# Patient Record
Sex: FEMALE | Race: BLACK OR AFRICAN AMERICAN | NOT HISPANIC OR LATINO | Employment: OTHER | URBAN - METROPOLITAN AREA
[De-identification: names, ages, dates, MRNs, and addresses within clinical notes are randomized per-mention and may not be internally consistent; named-entity substitution may affect disease eponyms.]

---

## 2018-07-28 ENCOUNTER — OFFICE VISIT (OUTPATIENT)
Dept: URGENT CARE | Facility: CLINIC | Age: 73
End: 2018-07-28
Payer: MEDICARE

## 2018-07-28 VITALS
OXYGEN SATURATION: 98 % | HEART RATE: 71 BPM | SYSTOLIC BLOOD PRESSURE: 132 MMHG | RESPIRATION RATE: 16 BRPM | WEIGHT: 240 LBS | DIASTOLIC BLOOD PRESSURE: 86 MMHG | HEIGHT: 65 IN | BODY MASS INDEX: 39.99 KG/M2 | TEMPERATURE: 98 F

## 2018-07-28 DIAGNOSIS — H04.202 WATERING OF LEFT EYE: ICD-10-CM

## 2018-07-28 DIAGNOSIS — J34.89 SINUS PAIN: ICD-10-CM

## 2018-07-28 PROCEDURE — 99204 OFFICE O/P NEW MOD 45 MIN: CPT | Performed by: PHYSICIAN ASSISTANT

## 2018-07-28 RX ORDER — FLUTICASONE PROPIONATE 50 MCG
1 SPRAY, SUSPENSION (ML) NASAL DAILY
Qty: 16 G | Refills: 0 | Status: SHIPPED | OUTPATIENT
Start: 2018-07-28 | End: 2021-12-17

## 2018-07-28 RX ORDER — DOXYCYCLINE HYCLATE 100 MG
100 TABLET ORAL 2 TIMES DAILY
Qty: 14 TAB | Refills: 0 | Status: SHIPPED | OUTPATIENT
Start: 2018-07-28 | End: 2018-08-04

## 2018-07-28 ASSESSMENT — ENCOUNTER SYMPTOMS
NAUSEA: 0
DOUBLE VISION: 0
PHOTOPHOBIA: 0
SHORTNESS OF BREATH: 0
PALPITATIONS: 0
BLURRED VISION: 0
EYE REDNESS: 0
FEVER: 0
HEADACHES: 0
MYALGIAS: 0
EYE DISCHARGE: 1
EYE PAIN: 0
VOMITING: 0
ABDOMINAL PAIN: 0
COUGH: 0
CHILLS: 0
TINGLING: 0
SENSORY CHANGE: 0
SINUS PAIN: 1
FOCAL WEAKNESS: 0

## 2018-07-28 NOTE — PROGRESS NOTES
Subjective:      Mimi Appiah is a 72 y.o. female who presents with Eye Problem ((L) eye wont stop watering, pain behind eye, sinus pressure. x 2 days. )            Patient reports eye watering x 2 months. She saw her regular PCP who gave her Erythromycin opthalmic ointment. Her symptoms improved but she continues to have nasal congestion and left sinus pressure/pain. She denies any left eyeball pain or redness. Discharge from left eye is clear. She has no vision changes. She has used warm compresses over her sinuses with moderate relief. She has also use salt water gargles with relief. She has long history of allergic rhinitis.      Past Medical History:   Diagnosis Date   • Abnormal Pap smear    • Allergic rhinitis    • Hemorrhoids    • Hypertension    • LBP (low back pain)        Past Surgical History:   Procedure Laterality Date   • TUBAL COAGULATION LAPAROSCOPIC BILATERAL         Family History   Problem Relation Age of Onset   • Cancer Sister    • Cancer Brother        Allergies   Allergen Reactions   • Mendoza Beans Swelling     Pt gets swollen lips with butter and lima beans.     • Peach [Prunus Persica] Nausea     Pt gets nauseous with peaches   • Atenolol Swelling     Only at doses above 50mg   • Chlorpheniramine    • Pcn [Penicillins] Swelling         Medications, Allergies, and current problem list reviewed today in Epic    Review of Systems   Constitutional: Negative for chills, fever and malaise/fatigue.   HENT: Positive for congestion and sinus pain. Negative for ear discharge and ear pain.    Eyes: Positive for discharge (left eye watering ). Negative for blurred vision, double vision, photophobia, pain and redness.   Respiratory: Negative for cough and shortness of breath.    Cardiovascular: Negative for chest pain, palpitations and leg swelling.   Gastrointestinal: Negative for abdominal pain, nausea and vomiting.   Musculoskeletal: Negative for myalgias.   Skin: Negative for rash.   Neurological:  "Negative for tingling, sensory change, focal weakness and headaches.     All other systems reviewed and are negative.        Objective:     /86   Pulse 71   Temp 36.7 °C (98 °F)   Resp 16   Ht 1.651 m (5' 5\")   Wt 108.9 kg (240 lb)   SpO2 98%   BMI 39.94 kg/m²      Physical Exam   Constitutional: She is oriented to person, place, and time. She appears well-developed and well-nourished. No distress.   HENT:   Head: Normocephalic and atraumatic.   Right Ear: Tympanic membrane, external ear and ear canal normal.   Left Ear: Tympanic membrane, external ear and ear canal normal.   Nose: Mucosal edema and rhinorrhea present. Left sinus exhibits maxillary sinus tenderness and frontal sinus tenderness.   Mouth/Throat: Uvula is midline and oropharynx is clear and moist.   Eyes: Pupils are equal, round, and reactive to light. Conjunctivae, EOM and lids are normal. Right eye exhibits no discharge and no exudate. Left eye exhibits discharge (clear discharge ). Left eye exhibits no exudate. No foreign body present in the left eye.   Neck: Neck supple.   Cardiovascular: Normal rate, regular rhythm and normal heart sounds.  Exam reveals no gallop and no friction rub.    No murmur heard.  Pulmonary/Chest: Effort normal and breath sounds normal. No respiratory distress. She has no wheezes. She has no rales.   Lymphadenopathy:     She has no cervical adenopathy.   Neurological: She is alert and oriented to person, place, and time. No cranial nerve deficit.   Skin: Skin is warm and dry. No rash noted.   Psychiatric: She has a normal mood and affect. Her behavior is normal. Judgment and thought content normal.               Assessment/Plan:     1. Sinus pain  - continue erythromycin opthalmic ointment   - doxycycline (VIBRAMYCIN) 100 MG Tab; Take 1 Tab by mouth 2 times a day for 7 days.  Dispense: 14 Tab; Refill: 0  - fluticasone (FLONASE) 50 MCG/ACT nasal spray; Spray 1 Spray in nose every day. Each nostril  Dispense: 16 " g; Refill: 0    2. Watering of left eye    - REFERRAL TO OPTOMETRY       Differential diagnoses, Supportive care, and indications for immediate follow-up discussed with patient.   Instructed to return to clinic or nearest emergency department for any change in condition, further concerns, or worsening of symptoms.    The patient demonstrated a good understanding and agreed with the treatment plan.    Ivonne Argueta P.A.-C.

## 2018-09-10 ENCOUNTER — HOSPITAL ENCOUNTER (EMERGENCY)
Facility: MEDICAL CENTER | Age: 73
End: 2018-09-10
Attending: EMERGENCY MEDICINE
Payer: MEDICARE

## 2018-09-10 ENCOUNTER — APPOINTMENT (OUTPATIENT)
Dept: RADIOLOGY | Facility: MEDICAL CENTER | Age: 73
End: 2018-09-10
Attending: EMERGENCY MEDICINE
Payer: MEDICARE

## 2018-09-10 VITALS
HEIGHT: 66 IN | OXYGEN SATURATION: 98 % | RESPIRATION RATE: 18 BRPM | HEART RATE: 88 BPM | TEMPERATURE: 97.7 F | DIASTOLIC BLOOD PRESSURE: 78 MMHG | WEIGHT: 240 LBS | BODY MASS INDEX: 38.57 KG/M2 | SYSTOLIC BLOOD PRESSURE: 145 MMHG

## 2018-09-10 DIAGNOSIS — S39.011A STRAIN OF MUSCLE OF RIGHT GROIN REGION: ICD-10-CM

## 2018-09-10 PROCEDURE — 99284 EMERGENCY DEPT VISIT MOD MDM: CPT

## 2018-09-10 PROCEDURE — 73502 X-RAY EXAM HIP UNI 2-3 VIEWS: CPT | Mod: RT

## 2018-09-10 ASSESSMENT — PAIN SCALES - GENERAL: PAINLEVEL_OUTOF10: 5

## 2018-09-10 NOTE — ED PROVIDER NOTES
ED Provider Note    CHIEF COMPLAINT  Chief Complaint   Patient presents with   • Fall       HPI  Mimi Appiah is a 72 y.o. female who presents for evaluation of right medial hip and groin pain. The patient was getting off a local bus, tripped and injured her right hip. She is able to bear weight primarily reports pain in the medial groin area. No injury to the head neck chest abdomen or pelvis. She does not take any systemic anticoagulants. Injury occurred 2 hours ago. She has no other complaints. Pain is worse with ambulation and extension at the hip    REVIEW OF SYSTEMS  See HPI for further details. No head injury neck pain back pain loss of consciousness All other systems are negative.     PAST MEDICAL HISTORY  Past Medical History:   Diagnosis Date   • Abnormal Pap smear    • Allergic rhinitis    • Hemorrhoids    • Hypertension    • LBP (low back pain)        FAMILY HISTORY  Noncontributory    SOCIAL HISTORY  Social History     Social History   • Marital status:      Spouse name: N/A   • Number of children: N/A   • Years of education: N/A     Social History Main Topics   • Smoking status: Former Smoker     Packs/day: 0.50     Types: Cigarettes   • Smokeless tobacco: Never Used   • Alcohol use No   • Drug use: No   • Sexual activity: Not on file     Other Topics Concern   • Not on file     Social History Narrative   • No narrative on file     Denies IV drugs  SURGICAL HISTORY  Past Surgical History:   Procedure Laterality Date   • TUBAL COAGULATION LAPAROSCOPIC BILATERAL         CURRENT MEDICATIONS  Home Medications    **Home medications have not yet been reviewed for this encounter**     No current facility-administered medications for this encounter.     Current Outpatient Prescriptions:   •  fluticasone (FLONASE) 50 MCG/ACT nasal spray, Spray 1 Spray in nose every day. Each nostril, Disp: 16 g, Rfl: 0  •  aspirin (ASA) 325 MG Tab, Take 1 Tab by mouth every day., Disp: 100 Tab, Rfl: 3  •  atorvastatin  "(LIPITOR) 40 MG Tab, Take 1 Tab by mouth every bedtime., Disp: 30 Tab, Rfl: 11  •  metoprolol (LOPRESSOR) 25 MG Tab, Take 25 mg by mouth every day., Disp: , Rfl:   •  amlodipine (NORVASC) 10 MG TABS, Take 1 Tab by mouth every day., Disp: 30 Tab, Rfl: 3      ALLERGIES  Allergies   Allergen Reactions   • Mendoza Beans Swelling     Pt gets swollen lips with butter and lima beans.     • Peach [Prunus Persica] Nausea     Pt gets nauseous with peaches   • Atenolol Swelling     Only at doses above 50mg   • Chlorpheniramine    • Pcn [Penicillins] Swelling       PHYSICAL EXAM  VITAL SIGNS: /84   Pulse 82   Temp 36.5 °C (97.7 °F)   Resp 17   Ht 1.676 m (5' 6\")   Wt 108.9 kg (240 lb)   SpO2 98%   BMI 38.74 kg/m²       Constitutional: Well developed, Well nourished, No acute distress, Non-toxic appearance.   HENT: Normocephalic, Atraumatic, Bilateral external ears normal, Oropharynx moist, No oral exudates, Nose normal.   Eyes: PERRLA, EOMI, Conjunctiva normal, No discharge.   Neck: Normal range of motion, No tenderness, Supple, No stridor.   Cardiovascular: Normal heart rate, Normal rhythm, No murmurs, No rubs, No gallops.   Thorax & Lungs: Normal breath sounds, No respiratory distress, No wheezing, No chest tenderness.   Abdomen: Bowel sounds normal, Soft, No tenderness, No masses, No pulsatile masses.   Skin: Warm, Dry, No erythema, No rash.   Back: No tenderness, No CVA tenderness.   Extremities: No bony tenderness on the pelvis pelvis is stable. Pain is elicited with extension at the hip with tenderness on the right medial thigh  Neurologic: Alert & oriented x 3, Normal motor function, Normal sensory function, No focal deficits noted.   Psychiatric: Anxious.       RADIOLOGY/PROCEDURES  DX-HIP-COMPLETE - UNILATERAL 2+ RIGHT   Final Result      No radiographic evidence of acute traumatic injury.          COURSE & MEDICAL DECISION MAKING  Pertinent Labs & Imaging studies reviewed. (See chart for details)  The " patient here is no evidence of hip fracture. As was a concern of an avulsion fracture. Clinical exam strongly suggest inguinal and groin strain. I recommended heating packs and says Tylenol and follow-up with PCP in 2-3 days    FINAL IMPRESSION  1.   1. Strain of muscle of right groin region               Electronically signed by: Stephan Hicks, 9/10/2018 4:41 PM

## 2018-09-10 NOTE — ED TRIAGE NOTES
"Pt to triage c/o right upper leg pain after falling down. \"I thought the seat was there but it wasn't\". No LOC.  Pt advised to return to triage nurse for any changes or concerns.   "

## 2018-09-11 NOTE — DISCHARGE INSTRUCTIONS
Inguinal Strain  Your exam shows you have an inguinal strain. This is also known as a pulled groin. This injury is usually due to a pull or partial tear to a muscle or tendon in the groin area. Most groin pulls take several weeks to heal completely. There may be pain with lifting your leg or walking during much of your recovery. Treatment for groin strains includes:  · Rest and avoid lifting or performing activities that increase your pain.  · Apply ice packs for 20-30 minutes every few hours to reduce pain and swelling over the next 2-3 days.  · Medicine to reduce pain and inflammation is often prescribed.  HOME CARE INSTRUCTIONS   While most strains in the groin area will heal with rest, you should also watch for any signs of a more serious condition.   SEEK IMMEDIATE MEDICAL CARE IF:   · You notice unusual swelling or bulging in the groin.  · You have pain or swelling in the testicle.  · Blood in your urine.  · Marked increased pain.  · Weakness or numbness of your leg or abdominal pain.  MAKE SURE YOU:   · Understand these instructions.  · Will watch your condition.  · Will get help right away if you are not doing well or get worse.  Document Released: 01/25/2006 Document Revised: 03/11/2013 Document Reviewed: 04/23/2009  [email or web address]® Patient Information ©2014 [employer].

## 2018-09-11 NOTE — ED NOTES
Pt discharged home as ordered by erp. Pt instructed to follow up with her PCP tomorrow as planned. Pt verbalized understanding. Pt left in her wheelchair.

## 2019-09-03 ENCOUNTER — IMMUNIZATION (OUTPATIENT)
Dept: SOCIAL WORK | Facility: CLINIC | Age: 74
End: 2019-09-03
Payer: MEDICARE

## 2019-09-03 DIAGNOSIS — Z23 NEED FOR VACCINATION: ICD-10-CM

## 2019-09-03 PROCEDURE — 90662 IIV NO PRSV INCREASED AG IM: CPT | Performed by: REGISTERED NURSE

## 2019-09-03 PROCEDURE — G0008 ADMIN INFLUENZA VIRUS VAC: HCPCS | Performed by: REGISTERED NURSE

## 2019-10-31 ENCOUNTER — HOSPITAL ENCOUNTER (OUTPATIENT)
Dept: RADIOLOGY | Facility: MEDICAL CENTER | Age: 74
End: 2019-10-31
Attending: FAMILY MEDICINE
Payer: MEDICARE

## 2019-10-31 DIAGNOSIS — M85.89 OTHER SPECIFIED DISORDERS OF BONE DENSITY AND STRUCTURE, MULTIPLE SITES: ICD-10-CM

## 2019-10-31 PROCEDURE — 77080 DXA BONE DENSITY AXIAL: CPT

## 2021-01-08 ENCOUNTER — HOSPITAL ENCOUNTER (OUTPATIENT)
Dept: RADIOLOGY | Facility: MEDICAL CENTER | Age: 76
End: 2021-01-08
Attending: PHYSICIAN ASSISTANT
Payer: MEDICARE

## 2021-01-08 DIAGNOSIS — M54.16 LUMBAR RADICULOPATHY: ICD-10-CM

## 2021-01-08 DIAGNOSIS — M48.062 PSEUDOCLAUDICATION SYNDROME: ICD-10-CM

## 2021-01-08 PROCEDURE — 72148 MRI LUMBAR SPINE W/O DYE: CPT

## 2021-01-08 PROCEDURE — 72110 X-RAY EXAM L-2 SPINE 4/>VWS: CPT

## 2021-01-15 DIAGNOSIS — Z23 NEED FOR VACCINATION: ICD-10-CM

## 2021-12-17 ENCOUNTER — OFFICE VISIT (OUTPATIENT)
Dept: MEDICAL GROUP | Facility: OTHER | Age: 76
End: 2021-12-17
Payer: MEDICARE

## 2021-12-17 VITALS
SYSTOLIC BLOOD PRESSURE: 130 MMHG | BODY MASS INDEX: 40.84 KG/M2 | TEMPERATURE: 96.2 F | OXYGEN SATURATION: 99 % | WEIGHT: 253 LBS | HEART RATE: 74 BPM | RESPIRATION RATE: 15 BRPM | DIASTOLIC BLOOD PRESSURE: 70 MMHG

## 2021-12-17 DIAGNOSIS — F33.1 MODERATE EPISODE OF RECURRENT MAJOR DEPRESSIVE DISORDER (HCC): ICD-10-CM

## 2021-12-17 DIAGNOSIS — I10 PRIMARY HYPERTENSION: ICD-10-CM

## 2021-12-17 DIAGNOSIS — M1A.9XX0 CHRONIC GOUT WITHOUT TOPHUS, UNSPECIFIED CAUSE, UNSPECIFIED SITE: ICD-10-CM

## 2021-12-17 DIAGNOSIS — Z12.11 SCREENING FOR COLON CANCER: ICD-10-CM

## 2021-12-17 PROCEDURE — 99214 OFFICE O/P EST MOD 30 MIN: CPT | Performed by: FAMILY MEDICINE

## 2021-12-17 RX ORDER — METOPROLOL SUCCINATE 25 MG/1
TABLET, EXTENDED RELEASE ORAL
COMMUNITY
Start: 2014-06-18 | End: 2021-12-17

## 2021-12-17 RX ORDER — ISONIAZID 300 MG/1
TABLET ORAL
COMMUNITY
Start: 2020-09-25 | End: 2021-12-17

## 2021-12-17 RX ORDER — ATORVASTATIN CALCIUM 20 MG/1
TABLET, FILM COATED ORAL
COMMUNITY
Start: 2020-11-24 | End: 2021-12-17

## 2021-12-17 RX ORDER — AMLODIPINE BESYLATE 10 MG/1
TABLET ORAL
COMMUNITY
Start: 2014-06-18 | End: 2022-01-19 | Stop reason: SDUPTHER

## 2021-12-17 RX ORDER — NICOTINE 21 MG/24HR
PATCH, TRANSDERMAL 24 HOURS TRANSDERMAL
COMMUNITY
Start: 2018-06-18 | End: 2021-12-17

## 2021-12-17 RX ORDER — CARVEDILOL 6.25 MG/1
6.25 TABLET ORAL 2 TIMES DAILY WITH MEALS
Qty: 200 TABLET | Refills: 3 | Status: SHIPPED | OUTPATIENT
Start: 2021-12-17 | End: 2022-03-15 | Stop reason: SDUPTHER

## 2021-12-17 RX ORDER — DESLORATADINE 5 MG/1
TABLET ORAL
COMMUNITY
Start: 2018-10-11 | End: 2021-12-17

## 2021-12-17 RX ORDER — MECLIZINE HYDROCHLORIDE 25 MG/1
TABLET ORAL
COMMUNITY
Start: 2018-06-18 | End: 2021-12-17

## 2021-12-17 RX ORDER — TIZANIDINE 4 MG/1
TABLET ORAL
COMMUNITY
Start: 2018-10-11 | End: 2021-12-17

## 2021-12-17 RX ORDER — SENNOSIDES 8.6 MG
CAPSULE ORAL
COMMUNITY
Start: 2019-02-11 | End: 2023-08-22

## 2021-12-17 RX ORDER — VENLAFAXINE 100 MG/1
TABLET ORAL
COMMUNITY
Start: 2021-08-12 | End: 2021-12-17

## 2021-12-17 RX ORDER — OLOPATADINE HYDROCHLORIDE 2 MG/ML
SOLUTION/ DROPS OPHTHALMIC
COMMUNITY
Start: 2018-10-11 | End: 2021-12-17

## 2021-12-17 RX ORDER — ALLOPURINOL 100 MG/1
TABLET ORAL
COMMUNITY
Start: 2021-12-09 | End: 2022-10-05 | Stop reason: SDUPTHER

## 2021-12-17 RX ORDER — VENLAFAXINE HYDROCHLORIDE 37.5 MG/1
CAPSULE, EXTENDED RELEASE ORAL
COMMUNITY
Start: 2021-11-03 | End: 2021-12-17

## 2021-12-17 RX ORDER — CHOLECALCIFEROL (VITAMIN D3) 125 MCG
CAPSULE ORAL
COMMUNITY
Start: 2020-12-11 | End: 2021-12-17

## 2021-12-17 RX ORDER — VENLAFAXINE HYDROCHLORIDE 75 MG/1
CAPSULE, EXTENDED RELEASE ORAL
COMMUNITY
Start: 2021-12-08 | End: 2021-12-17

## 2021-12-17 RX ORDER — BUPROPION HYDROCHLORIDE 75 MG/1
75 TABLET ORAL 2 TIMES DAILY
Qty: 60 TABLET | Refills: 2 | Status: SHIPPED | OUTPATIENT
Start: 2021-12-17 | End: 2022-03-16 | Stop reason: SDUPTHER

## 2021-12-17 RX ORDER — CARVEDILOL 6.25 MG/1
TABLET ORAL
COMMUNITY
Start: 2021-11-03 | End: 2021-12-17 | Stop reason: SDUPTHER

## 2021-12-17 RX ORDER — VENLAFAXINE HYDROCHLORIDE 37.5 MG/1
CAPSULE, EXTENDED RELEASE ORAL
COMMUNITY
Start: 2021-01-06 | End: 2021-12-17

## 2021-12-17 RX ORDER — ZOSTER VACCINE RECOMBINANT, ADJUVANTED 50 MCG/0.5
KIT INTRAMUSCULAR
COMMUNITY
Start: 2019-10-16 | End: 2021-12-17

## 2021-12-17 RX ORDER — VENLAFAXINE HYDROCHLORIDE 75 MG/1
CAPSULE, EXTENDED RELEASE ORAL
COMMUNITY
Start: 2020-12-11 | End: 2022-03-15

## 2021-12-17 ASSESSMENT — PATIENT HEALTH QUESTIONNAIRE - PHQ9
5. POOR APPETITE OR OVEREATING: 0 - NOT AT ALL
CLINICAL INTERPRETATION OF PHQ2 SCORE: 2

## 2022-01-14 ENCOUNTER — OFFICE VISIT (OUTPATIENT)
Dept: MEDICAL GROUP | Facility: OTHER | Age: 77
End: 2022-01-14
Payer: MEDICARE

## 2022-01-14 VITALS
OXYGEN SATURATION: 97 % | DIASTOLIC BLOOD PRESSURE: 80 MMHG | WEIGHT: 253 LBS | RESPIRATION RATE: 16 BRPM | SYSTOLIC BLOOD PRESSURE: 130 MMHG | HEIGHT: 65 IN | BODY MASS INDEX: 42.15 KG/M2 | HEART RATE: 76 BPM | TEMPERATURE: 97.7 F

## 2022-01-14 DIAGNOSIS — I63.9 CEREBROVASCULAR ACCIDENT (CVA), UNSPECIFIED MECHANISM (HCC): ICD-10-CM

## 2022-01-14 DIAGNOSIS — E78.5 DYSLIPIDEMIA: ICD-10-CM

## 2022-01-14 DIAGNOSIS — F33.1 MAJOR DEPRESSIVE DISORDER, RECURRENT EPISODE, MODERATE (HCC): ICD-10-CM

## 2022-01-14 DIAGNOSIS — F17.200 TOBACCO DEPENDENCE SYNDROME: ICD-10-CM

## 2022-01-14 PROBLEM — M25.561 KNEE PAIN, RIGHT: Status: ACTIVE | Noted: 2019-02-11

## 2022-01-14 PROBLEM — M20.009 FINGER DEFORMITY: Status: ACTIVE | Noted: 2017-12-11

## 2022-01-14 PROBLEM — M85.89 OTHER SPECIFIED DISORDERS OF BONE DENSITY AND STRUCTURE, MULTIPLE SITES: Status: ACTIVE | Noted: 2019-10-16

## 2022-01-14 PROBLEM — R76.12 NONSPECIFIC REACTION TO CELL MEDIATED IMMUNITY MEASUREMENT OF GAMMA INTERFERON ANTIGEN RESPONSE WITHOUT ACTIVE TUBERCULOSIS: Status: ACTIVE | Noted: 2020-07-30

## 2022-01-14 PROBLEM — E55.9 HYPOVITAMINOSIS D: Status: ACTIVE | Noted: 2020-12-11

## 2022-01-14 PROBLEM — R74.8 HIGH ALKALINE PHOSPHATASE: Status: ACTIVE | Noted: 2020-07-30

## 2022-01-14 PROBLEM — M79.605 LEG PAIN, BILATERAL: Status: ACTIVE | Noted: 2017-09-05

## 2022-01-14 PROBLEM — M79.604 LEG PAIN, BILATERAL: Status: ACTIVE | Noted: 2017-09-05
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
## 2022-01-14 PROBLEM — N18.32 STAGE 3B CHRONIC KIDNEY DISEASE: Status: ACTIVE | Noted: 2018-12-12

## 2022-01-14 PROBLEM — R61 NIGHT SWEATS: Status: ACTIVE | Noted: 2020-07-14

## 2022-01-14 PROBLEM — D72.820 LYMPHOCYTOSIS: Status: ACTIVE | Noted: 2020-07-30

## 2022-01-14 PROBLEM — D75.89 MACROCYTOSIS: Status: ACTIVE | Noted: 2020-08-14

## 2022-01-14 PROBLEM — R25.2 MUSCLE CRAMPS: Status: ACTIVE | Noted: 2017-04-17

## 2022-01-14 PROBLEM — Z22.7 LATENT TUBERCULOSIS: Status: ACTIVE | Noted: 2020-09-25

## 2022-01-14 PROBLEM — K59.00 CONSTIPATION: Status: ACTIVE | Noted: 2018-12-12

## 2022-01-14 PROBLEM — E87.29 HIGH ANION GAP METABOLIC ACIDOSIS: Status: ACTIVE | Noted: 2020-07-30

## 2022-01-14 PROBLEM — R09.81 CONGESTION OF NASAL SINUS: Status: ACTIVE | Noted: 2020-01-07

## 2022-01-14 PROBLEM — R29.898 LOWER EXTREMITY WEAKNESS: Status: ACTIVE | Noted: 2019-10-16

## 2022-01-14 PROBLEM — M10.9 GOUT: Status: ACTIVE | Noted: 2018-11-15

## 2022-01-14 PROBLEM — E34.9 ABNORMALITY OF HORMONE: Status: ACTIVE | Noted: 2020-08-14

## 2022-01-14 PROBLEM — H04.209 EYE TEARING: Status: ACTIVE | Noted: 2018-10-11

## 2022-01-14 PROBLEM — J32.9 SINUSITIS: Status: ACTIVE | Noted: 2020-04-23

## 2022-01-14 PROBLEM — K05.10 GINGIVITIS: Status: ACTIVE | Noted: 2017-11-09

## 2022-01-14 PROCEDURE — 99214 OFFICE O/P EST MOD 30 MIN: CPT | Performed by: FAMILY MEDICINE

## 2022-01-14 RX ORDER — ATORVASTATIN CALCIUM 40 MG/1
40 TABLET, FILM COATED ORAL DAILY
Qty: 30 TABLET | Refills: 11 | Status: SHIPPED | OUTPATIENT
Start: 2022-01-14 | End: 2022-07-01

## 2022-01-14 ASSESSMENT — PATIENT HEALTH QUESTIONNAIRE - PHQ9
8. MOVING OR SPEAKING SO SLOWLY THAT OTHER PEOPLE COULD HAVE NOTICED. OR THE OPPOSITE, BEING SO FIGETY OR RESTLESS THAT YOU HAVE BEEN MOVING AROUND A LOT MORE THAN USUAL: NOT AT ALL
5. POOR APPETITE OR OVEREATING: NOT AT ALL
6. FEELING BAD ABOUT YOURSELF - OR THAT YOU ARE A FAILURE OR HAVE LET YOURSELF OR YOUR FAMILY DOWN: NEARLY EVERY DAY
3. TROUBLE FALLING OR STAYING ASLEEP OR SLEEPING TOO MUCH: NOT AT ALL
4. FEELING TIRED OR HAVING LITTLE ENERGY: SEVERAL DAYS
SUM OF ALL RESPONSES TO PHQ QUESTIONS 1-9: 8
1. LITTLE INTEREST OR PLEASURE IN DOING THINGS: NEARLY EVERY DAY
SUM OF ALL RESPONSES TO PHQ9 QUESTIONS 1 AND 2: 4
2. FEELING DOWN, DEPRESSED, IRRITABLE, OR HOPELESS: SEVERAL DAYS
7. TROUBLE CONCENTRATING ON THINGS, SUCH AS READING THE NEWSPAPER OR WATCHING TELEVISION: NOT AT ALL
9. THOUGHTS THAT YOU WOULD BE BETTER OFF DEAD, OR OF HURTING YOURSELF: NOT AT ALL

## 2022-01-14 ASSESSMENT — FIBROSIS 4 INDEX: FIB4 SCORE: 1.44

## 2022-01-14 NOTE — ASSESSMENT & PLAN NOTE
Will have her start the lower dose of Venlafaxine ER 37.5 mg daily for 30 days, then take it every other day for a month.   Continue taking Wellbutrin 75 mg twice daily.   Recheck in about 6 weeks with Dr Carmen.

## 2022-01-14 NOTE — PATIENT INSTRUCTIONS
Will have you start taking the lower dose of VENLAFAXINE ER 37.5 mg daily for 30 days, then take it every other day for a month.     Continue taking WELLBUTRIN 75 mg twice daily.     Recheck in about 6 weeks with Dr Carmen.     Call if any problems.     NIce to meet you!!!

## 2022-01-14 NOTE — PROGRESS NOTES
[facility] MEDICINE     PATIENT ID:  NAME:  Mimi Appiah  MRN:               [id number]  YOB: 1945    Date: 1:57 PM      CC:  Depression check      HPI: Mimi Appiah is a 76 y.o. female who presented with moderate depression that has been treated with meds.     DEPRESSION --- She has been taking the Venlafaxine ER 75 mg daily and has not started the lower dose of it yet.  Was prescribed 37.5 mg Venlafaxine ER daily to wean down apparently.  She was not sure of what she was supposed to do.  She has been taking the Wellbutrin 75 mg twice daily and feels much better overall.  Wants to get off the Venlafaxine ER.      No past suicide attempts.  She did have some suicidal ideation in past when she tried to just stop taking her Venlafaxine.     SMOKING --- She has cut down to having 4 cigarettes a day and is content to stay there.      CVA --- Has significant past history of stroke.  Using walker to get around.      LIPIDS --- Labs of 1/6/22: , HDL 50.     REVIEW OF SYSTEMS:   Denies chest pain, dyspnea.                 PROBLEM LIST  Patient Active Problem List   Diagnosis   • Hypertension   • Encounter for screening for malignant neoplasm of breast   • Low back pain   • Abnormal Pap smear   • Allergic rhinitis   • HEMORRHOIDS   • Vertebral artery occlusion   • Abnormality of hormone   • Constipation   • CVA (cerebrovascular accident) (HCC)   • Dyslipidemia   • Eye tearing   • Finger deformity   • Leg pain, bilateral   • Knee pain, right   • Gingivitis   • Gout   • High alkaline phosphatase   • High anion gap metabolic acidosis   • Hypovitaminosis D   • Impaired glucose tolerance   • Latent tuberculosis   • Lower extremity weakness   • Lymphocytosis   • Macrocytosis   • Major depressive disorder, recurrent episode, moderate (HCC)   • Mixed anxiety depressive disorder   • Nonspecific reaction to cell mediated immunity measurement of gamma interferon antigen response without active tuberculosis    • Vertigo   • Unspecified fall, initial encounter   • Tobacco dependence syndrome   • Standard chest x-ray abnormal   • Stage 3b chronic kidney disease (HCC)   • Sinusitis   • Congestion of nasal sinus   • Other specified disorders of bone density and structure, multiple sites   • Obesity   • Neck pain, chronic   • Night sweats   • Muscle cramps        PAST SURGICAL HISTORY:  Past Surgical History:   Procedure Laterality Date   • TUBAL COAGULATION LAPAROSCOPIC BILATERAL         FAMILY HISTORY:  Family History   Problem Relation Age of Onset   • Cancer Sister    • Cancer Brother        SOCIAL HISTORY:   Social History     Tobacco Use   • Smoking status: Current Some Day Smoker     Packs/day: 0.50     Types: Cigarettes   • Smokeless tobacco: Never Used   Substance Use Topics   • Alcohol use: No       ALLERGIES:  Allergies   Allergen Reactions   • Penicillins Swelling     Other reaction(s): head and face swelling   • Risperdal [Benzoic Acid-Risperidone]      Other reaction(s): bad reaction all over body   • Mendoza Beans Swelling     Pt gets swollen lips with butter and lima beans.     • Peach [Prunus Persica] Nausea     Pt gets nauseous with peaches   • Atenolol Swelling     Only at doses above 50mg   • Chlorpheniramine        OUTPATIENT MEDICATIONS:    Current Outpatient Medications:   •  acetaminophen (TYLENOL) 650 MG CR tablet, ACETAMINOPHEN  MG CR-TABS, Disp: , Rfl:   •  allopurinol (ZYLOPRIM) 100 MG Tab, , Disp: , Rfl:   •  venlafaxine XR (EFFEXOR XR) 75 MG CAPSULE SR 24 HR, VENLAFAXINE HCL ER 75 MG XR24H-CAP, Disp: , Rfl:   •  amLODIPine (NORVASC) 10 MG Tab, AMLODIPINE BESYLATE 10 MG TABS, Disp: , Rfl:   •  buPROPion (WELLBUTRIN) 75 MG Tab, Take 1 Tablet by mouth 2 times a day., Disp: 60 Tablet, Rfl: 2  •  carvedilol (COREG) 6.25 MG Tab, Take 1 Tablet by mouth 2 times a day with meals., Disp: 200 Tablet, Rfl: 3  •  aspirin (ASA) 325 MG Tab, Take 1 Tab by mouth every day., Disp: 100 Tab, Rfl: 3    PHYSICAL  "EXAM:  Vitals:    01/14/22 1320   BP: 130/80   BP Location: Left arm   Patient Position: Sitting   BP Cuff Size: Large adult long   Pulse: 76   Resp: 16   Temp: 36.5 °C (97.7 °F)   TempSrc: Temporal   SpO2: 97%   Weight: 115 kg (253 lb)   Height: 1.659 m (5' 5.3\")       General: Pt resting in NAD, cooperative   Skin:  Pink, warm and dry.  HEENT: NC/AT. EOMI.  Extremities:  Full range of motion.  CNS:  Muscle tone is normal. No gross focal neurologic deficits.  Articulate.       ASSESSMENT/PLAN:   76 y.o. female     Problem List Items Addressed This Visit     Major depressive disorder, recurrent episode, moderate (HCC)     Will have her start the lower dose of Venlafaxine ER 37.5 mg daily for 30 days, then take it every other day for a month.   Continue taking Wellbutrin 75 mg twice daily.   Recheck in about 6 weeks with Dr Carmen.          Tobacco dependence syndrome     Discussed smoking cessation.  She has done well to cut down.                  CVA --- Continue risk factor modification.  Control blood pressure and lipids.  Discussed smoking cessation.     LIPIDS --- Start Atorvastatin 40 mg daily to reduce stroke risk.  Discussed statin therapy benefits with her.     Jt Potter MD  [facility]     "

## 2022-01-18 NOTE — TELEPHONE ENCOUNTER
PT IS CALLING TO GET REFILL ON AMLODIPINE 10MG TO SAME PHARMACY [facility].

## 2022-01-20 RX ORDER — AMLODIPINE BESYLATE 10 MG/1
10 TABLET ORAL DAILY
Qty: 30 TABLET | Refills: 3 | Status: SHIPPED | OUTPATIENT
Start: 2022-01-20 | End: 2022-03-15 | Stop reason: SDUPTHER

## 2022-03-15 ENCOUNTER — OFFICE VISIT (OUTPATIENT)
Dept: MEDICAL GROUP | Facility: OTHER | Age: 77
End: 2022-03-15
Payer: MEDICARE

## 2022-03-15 VITALS
WEIGHT: 250 LBS | DIASTOLIC BLOOD PRESSURE: 74 MMHG | HEIGHT: 66 IN | SYSTOLIC BLOOD PRESSURE: 134 MMHG | HEART RATE: 72 BPM | RESPIRATION RATE: 20 BRPM | BODY MASS INDEX: 40.18 KG/M2 | TEMPERATURE: 97.6 F | OXYGEN SATURATION: 96 %

## 2022-03-15 DIAGNOSIS — F41.8 MIXED ANXIETY DEPRESSIVE DISORDER: ICD-10-CM

## 2022-03-15 DIAGNOSIS — F33.1 MAJOR DEPRESSIVE DISORDER, RECURRENT EPISODE, MODERATE (HCC): ICD-10-CM

## 2022-03-15 DIAGNOSIS — J30.89 ENVIRONMENTAL AND SEASONAL ALLERGIES: Chronic | ICD-10-CM

## 2022-03-15 DIAGNOSIS — G89.29 NECK PAIN, CHRONIC: ICD-10-CM

## 2022-03-15 DIAGNOSIS — M54.2 NECK PAIN, CHRONIC: ICD-10-CM

## 2022-03-15 DIAGNOSIS — I10 PRIMARY HYPERTENSION: ICD-10-CM

## 2022-03-15 PROCEDURE — 99214 OFFICE O/P EST MOD 30 MIN: CPT | Performed by: FAMILY MEDICINE

## 2022-03-15 RX ORDER — GABAPENTIN 100 MG/1
100 CAPSULE ORAL 2 TIMES DAILY
Qty: 200 CAPSULE | Refills: 3 | Status: SHIPPED | OUTPATIENT
Start: 2022-03-15 | End: 2022-08-16 | Stop reason: SDUPTHER

## 2022-03-15 RX ORDER — MONTELUKAST SODIUM 10 MG/1
10 TABLET ORAL DAILY
Qty: 100 TABLET | Refills: 3 | Status: SHIPPED | OUTPATIENT
Start: 2022-03-15 | End: 2022-07-01

## 2022-03-15 RX ORDER — VENLAFAXINE HYDROCHLORIDE 37.5 MG/1
37.5 CAPSULE, EXTENDED RELEASE ORAL DAILY
Qty: 30 CAPSULE | Refills: 1 | Status: SHIPPED | OUTPATIENT
Start: 2022-03-15 | End: 2022-07-01

## 2022-03-15 RX ORDER — AMLODIPINE BESYLATE 10 MG/1
10 TABLET ORAL DAILY
Qty: 100 TABLET | Refills: 3 | Status: SHIPPED | OUTPATIENT
Start: 2022-03-15 | End: 2023-03-28 | Stop reason: SDUPTHER

## 2022-03-15 RX ORDER — CARVEDILOL 6.25 MG/1
6.25 TABLET ORAL 2 TIMES DAILY WITH MEALS
Qty: 200 TABLET | Refills: 3 | Status: SHIPPED | OUTPATIENT
Start: 2022-03-15 | End: 2023-03-28 | Stop reason: SDUPTHER

## 2022-03-15 RX ORDER — CETIRIZINE HYDROCHLORIDE 10 MG/1
10 TABLET ORAL DAILY
Qty: 100 TABLET | Refills: 3 | Status: SHIPPED | OUTPATIENT
Start: 2022-03-15 | End: 2023-08-22

## 2022-03-15 ASSESSMENT — FIBROSIS 4 INDEX: FIB4 SCORE: 1.44

## 2022-03-15 NOTE — PATIENT INSTRUCTIONS
Take Effexor XR 37.5 mg every other day for one month and stop.     Other medications refilled including Zyrtec, Montelukast.     Recheck 3 months.

## 2022-03-15 NOTE — PROGRESS NOTES
[facility] [place] [place] [facility] MEDICINE     PATIENT ID:  NAME:  Mimi Appiah  MRN:               [id number]  YOB: 1945    Date: 2:25 PM      CC:  Med refills      HPI: Mimi Appiah is a 76 y.o. female who presented with multiple problems that require medication.  Her allergies have been acting up and was taking Montelukast when she was in [place] which helped her.  Also wants some Zyrtec daily.      Problem   Environmental and Seasonal Allergies    Wants a prescription for Montelukast and also Zyrtec for her allergies.  Has done well with those meds in the past.  Does not have asthma.       Major Depressive Disorder, Recurrent Episode, Moderate (Hcc)    She has been taking the Wellbutrin 75 mg twice daily and feels much better overall.  Has taken Effexor XR 37.5 mg daily since last seen.  Now ready to take it every other day for one month. There are no past suicide attempts.  She did have some suicidal ideation in past when she tried to just stop taking her Venlafaxine.        Neck Pain, Chronic    Has been on Gabapentin 100 mg for chronic pain  And this helps her.       Hypertension    /74 today.  Needs med refilled for hypertension.  Takes Carvedilol and Amlodipine.           REVIEW OF SYSTEMS:   Ten systems reviewed and were negative except as noted in the HPI.                PROBLEM LIST  Patient Active Problem List   Diagnosis   • Hypertension   • Encounter for screening for malignant neoplasm of breast   • Low back pain   • Abnormal Pap smear   • Allergic rhinitis   • HEMORRHOIDS   • Vertebral artery occlusion   • Abnormality of hormone   • Constipation   • CVA (cerebrovascular accident) (HCC)   • Dyslipidemia   • Eye tearing   • Finger deformity   • Leg pain, bilateral   • Knee pain, right   • Gingivitis   • Gout   • High alkaline phosphatase   • High anion gap metabolic acidosis   • Hypovitaminosis D   • Impaired glucose tolerance   • Latent tuberculosis   • Lower extremity weakness   •  Lymphocytosis   • Macrocytosis   • Major depressive disorder, recurrent episode, moderate (HCC)   • Mixed anxiety depressive disorder   • Nonspecific reaction to cell mediated immunity measurement of gamma interferon antigen response without active tuberculosis   • Vertigo   • Unspecified fall, initial encounter   • Tobacco dependence syndrome   • Standard chest x-ray abnormal   • Stage 3b chronic kidney disease (HCC)   • Sinusitis   • Congestion of nasal sinus   • Other specified disorders of bone density and structure, multiple sites   • Obesity   • Neck pain, chronic   • Night sweats   • Muscle cramps   • Environmental and seasonal allergies        PAST SURGICAL HISTORY:  Past Surgical History:   Procedure Laterality Date   • TUBAL COAGULATION LAPAROSCOPIC BILATERAL         FAMILY HISTORY:  Family History   Problem Relation Age of Onset   • Cancer Sister    • Cancer Brother        SOCIAL HISTORY:   Social History     Tobacco Use   • Smoking status: Current Some Day Smoker     Packs/day: 0.50     Types: Cigarettes   • Smokeless tobacco: Never Used   Substance Use Topics   • Alcohol use: No       ALLERGIES:  Allergies   Allergen Reactions   • Penicillins Swelling     Other reaction(s): head and face swelling   • Risperdal [Benzoic Acid-Risperidone]      Other reaction(s): bad reaction all over body   • Mendoza Beans Swelling     Pt gets swollen lips with butter and lima beans.     • Peach [Prunus Persica] Nausea     Pt gets nauseous with peaches   • Atenolol Swelling     Only at doses above 50mg   • Chlorpheniramine        OUTPATIENT MEDICATIONS:    Current Outpatient Medications:   •  carvedilol (COREG) 6.25 MG Tab, Take 1 Tablet by mouth 2 times a day with meals., Disp: 200 Tablet, Rfl: 3  •  amLODIPine (NORVASC) 10 MG Tab, Take 1 Tablet by mouth every day., Disp: 100 Tablet, Rfl: 3  •  montelukast (SINGULAIR) 10 MG Tab, Take 1 Tablet by mouth every day., Disp: 100 Tablet, Rfl: 3  •  cetirizine (ZYRTEC) 10 MG Tab,  "Take 1 Tablet by mouth every day., Disp: 100 Tablet, Rfl: 3  •  venlafaxine XR (EFFEXOR XR) 37.5 MG CAPSULE SR 24 HR, Take 1 Capsule by mouth every day., Disp: 30 Capsule, Rfl: 1  •  gabapentin (NEURONTIN) 100 MG Cap, Take 1 Capsule by mouth 2 times a day., Disp: 200 Capsule, Rfl: 3  •  atorvastatin (LIPITOR) 40 MG Tab, Take 1 Tablet by mouth every day., Disp: 30 Tablet, Rfl: 11  •  acetaminophen (TYLENOL) 650 MG CR tablet, ACETAMINOPHEN  MG CR-TABS, Disp: , Rfl:   •  allopurinol (ZYLOPRIM) 100 MG Tab, , Disp: , Rfl:   •  buPROPion (WELLBUTRIN) 75 MG Tab, Take 1 Tablet by mouth 2 times a day., Disp: 60 Tablet, Rfl: 2  •  aspirin (ASA) 325 MG Tab, Take 1 Tab by mouth every day., Disp: 100 Tab, Rfl: 3    PHYSICAL EXAM:  Vitals:    03/15/22 1328   BP: 134/74   BP Location: Left arm   Patient Position: Sitting   BP Cuff Size: Large adult   Pulse: 72   Resp: 20   Temp: 36.4 °C (97.6 °F)   TempSrc: Temporal   SpO2: 96%   Weight: 113 kg (250 lb)   Height: 1.67 m (5' 5.75\")       General: Pt resting in NAD, cooperative; overweight.  Sitting in wheelchair.    Skin:  Pink, warm and dry.  HEENT: NC/AT. EOMI.  Lungs:  Symmetrical.  CTAB, good air movement   Cardiovascular:  S1/S2 RRR   Abdomen:  Abdomen is soft, nontender.    Extremities:  Full range of motion.  CNS:  Muscle tone is normal. No gross focal neurologic deficits      ASSESSMENT/PLAN:   76 y.o. female     Problem List Items Addressed This Visit     Environmental and seasonal allergies (Chronic)     Start Montelukast 10 mg daily.    Start Zyrtec 10 mg daily for allergy symptoms.          Relevant Medications    montelukast (SINGULAIR) 10 MG Tab    cetirizine (ZYRTEC) 10 MG Tab    Hypertension     Continue Carvedilol and Amlodipine.   Refill both meds for one year.          Relevant Medications    carvedilol (COREG) 6.25 MG Tab    amLODIPine (NORVASC) 10 MG Tab    Major depressive disorder, recurrent episode, moderate (HCC)     Take Effexor XR 37.5 mg every " other day for one month and stop.   Continue Bupropion 75 mg twice daily.          Relevant Medications    venlafaxine XR (EFFEXOR XR) 37.5 MG CAPSULE SR 24 HR    Mixed anxiety depressive disorder    Relevant Medications    venlafaxine XR (EFFEXOR XR) 37.5 MG CAPSULE SR 24 HR    Neck pain, chronic     Will have her take Gabapentin twice daily for her chronic neck and low back pain.           Relevant Medications    venlafaxine XR (EFFEXOR XR) 37.5 MG CAPSULE SR 24 HR    gabapentin (NEURONTIN) 100 MG Cap          Jt Potter MD  [facility]

## 2022-03-15 NOTE — ASSESSMENT & PLAN NOTE
Take Effexor XR 37.5 mg every other day for one month and stop.   Continue Bupropion 75 mg twice daily.

## 2022-03-16 DIAGNOSIS — F33.1 MODERATE EPISODE OF RECURRENT MAJOR DEPRESSIVE DISORDER (HCC): Primary | ICD-10-CM

## 2022-03-16 RX ORDER — BUPROPION HYDROCHLORIDE 75 MG/1
75 TABLET ORAL 2 TIMES DAILY
Qty: 60 TABLET | Refills: 5 | Status: SHIPPED | OUTPATIENT
Start: 2022-03-16 | End: 2022-07-01

## 2022-03-16 NOTE — TELEPHONE ENCOUNTER
Pt came in yesterday for Rx refills. Pt stated that none of her Rx were at the pharmacy. She uses [facility] pharmacy any questions please call pt @ [phone] that is the phone number for the pharmacy.

## 2022-03-16 NOTE — TELEPHONE ENCOUNTER
I called the pharmacy, the only med that was missing was her Bupropion, please send to the pharmacy.

## 2022-05-20 ENCOUNTER — TELEPHONE (OUTPATIENT)
Dept: HEALTH INFORMATION MANAGEMENT | Facility: OTHER | Age: 77
End: 2022-05-20
Payer: MEDICARE

## 2022-06-22 ENCOUNTER — TELEPHONE (OUTPATIENT)
Dept: HEALTH INFORMATION MANAGEMENT | Facility: OTHER | Age: 77
End: 2022-06-22

## 2022-06-22 NOTE — TELEPHONE ENCOUNTER
Member called and had questions about prescription eye glasses and to go over her Comprehensive Health Assessment. I did provide her the number to [facility] customer service. Mimi had the impression that she had a  assigned to her. I did inform her that since she does not have a [facility] PCP that she was not assigned a . I did offer to schedule Mimi with one of our [facility] Providers and she did accept. Scheduled her on 07/08/22 at 12pm with Sapna Wayne at [address] HIPAA verified, no exposure to Covid. I also went over Uber with her since she stated she has no transportation. She will call us back to schedule her Uber for her upcoming appointments.

## 2022-07-01 PROBLEM — E66.01 MORBID OBESITY WITH BMI OF 40.0-44.9, ADULT (HCC): Status: ACTIVE | Noted: 2022-07-01

## 2022-07-01 PROBLEM — I69.30 SEQUELAE, POST-STROKE: Status: ACTIVE | Noted: 2022-07-01

## 2022-07-08 ENCOUNTER — OFFICE VISIT (OUTPATIENT)
Dept: MEDICAL GROUP | Facility: PHYSICIAN GROUP | Age: 77
End: 2022-07-08
Payer: MEDICARE

## 2022-07-08 ENCOUNTER — RESEARCH ENCOUNTER (OUTPATIENT)
Dept: MEDICAL GROUP | Facility: PHYSICIAN GROUP | Age: 77
End: 2022-07-08

## 2022-07-08 VITALS
TEMPERATURE: 98.6 F | HEART RATE: 68 BPM | HEIGHT: 65 IN | SYSTOLIC BLOOD PRESSURE: 130 MMHG | DIASTOLIC BLOOD PRESSURE: 74 MMHG | WEIGHT: 246 LBS | OXYGEN SATURATION: 99 % | BODY MASS INDEX: 40.98 KG/M2 | RESPIRATION RATE: 16 BRPM

## 2022-07-08 DIAGNOSIS — N18.32 CHRONIC KIDNEY DISEASE (CKD) STAGE G3B/A1, MODERATELY DECREASED GLOMERULAR FILTRATION RATE (GFR) BETWEEN 30-44 ML/MIN/1.73 SQUARE METER AND ALBUMINURIA CREATININE RATIO LESS THAN 30 MG/G: ICD-10-CM

## 2022-07-08 DIAGNOSIS — I10 PRIMARY HYPERTENSION: ICD-10-CM

## 2022-07-08 DIAGNOSIS — R73.09 ELEVATED GLUCOSE: ICD-10-CM

## 2022-07-08 DIAGNOSIS — M54.32 SCIATICA OF LEFT SIDE: ICD-10-CM

## 2022-07-08 DIAGNOSIS — Z00.6 RESEARCH STUDY PATIENT: ICD-10-CM

## 2022-07-08 DIAGNOSIS — F33.1 MAJOR DEPRESSIVE DISORDER, RECURRENT EPISODE, MODERATE (HCC): ICD-10-CM

## 2022-07-08 DIAGNOSIS — Z00.00 HEALTHCARE MAINTENANCE: ICD-10-CM

## 2022-07-08 DIAGNOSIS — Z86.73 HISTORY OF CVA (CEREBROVASCULAR ACCIDENT): ICD-10-CM

## 2022-07-08 DIAGNOSIS — M10.9 GOUT, UNSPECIFIED CAUSE, UNSPECIFIED CHRONICITY, UNSPECIFIED SITE: ICD-10-CM

## 2022-07-08 PROBLEM — M85.89 OTHER SPECIFIED DISORDERS OF BONE DENSITY AND STRUCTURE, MULTIPLE SITES: Status: RESOLVED | Noted: 2019-10-16 | Resolved: 2022-07-08
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
## 2022-07-08 PROBLEM — R25.2 MUSCLE CRAMPS: Status: RESOLVED | Noted: 2017-04-17 | Resolved: 2022-07-08

## 2022-07-08 PROBLEM — H04.209 EYE TEARING: Status: RESOLVED | Noted: 2018-10-11 | Resolved: 2022-07-08

## 2022-07-08 PROBLEM — I69.30 SEQUELAE, POST-STROKE: Status: RESOLVED | Noted: 2022-07-01 | Resolved: 2022-07-08

## 2022-07-08 PROBLEM — M20.009 FINGER DEFORMITY: Status: RESOLVED | Noted: 2017-12-11 | Resolved: 2022-07-08

## 2022-07-08 PROBLEM — E34.9 ABNORMALITY OF HORMONE: Status: RESOLVED | Noted: 2020-08-14 | Resolved: 2022-07-08

## 2022-07-08 PROBLEM — J32.9 SINUSITIS: Status: RESOLVED | Noted: 2020-04-23 | Resolved: 2022-07-08

## 2022-07-08 PROBLEM — R61 NIGHT SWEATS: Status: RESOLVED | Noted: 2020-07-14 | Resolved: 2022-07-08

## 2022-07-08 PROBLEM — R09.81 CONGESTION OF NASAL SINUS: Status: RESOLVED | Noted: 2020-01-07 | Resolved: 2022-07-08

## 2022-07-08 PROCEDURE — 99205 OFFICE O/P NEW HI 60 MIN: CPT | Performed by: FAMILY MEDICINE

## 2022-07-08 RX ORDER — ROSUVASTATIN CALCIUM 5 MG/1
5 TABLET, COATED ORAL EVERY EVENING
Qty: 30 TABLET | Refills: 0 | Status: SHIPPED | OUTPATIENT
Start: 2022-07-08 | End: 2023-01-18 | Stop reason: SDUPTHER

## 2022-07-08 ASSESSMENT — FIBROSIS 4 INDEX: FIB4 SCORE: 1.44

## 2022-07-08 NOTE — ASSESSMENT & PLAN NOTE
States she had her last stroke 3 years ago. States she has been told she has had 3 in the past. Currently not on statin therapy at this time. States she had an intolerance to Lipitor, will start low dose Rosuvastatin at this time.
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
## 2022-07-08 NOTE — ASSESSMENT & PLAN NOTE
Chronic, states she had not had a flare up In awhile due to dietary changes. She is also on Allopurinol daily 100mg

## 2022-07-08 NOTE — ASSESSMENT & PLAN NOTE
Chronic, states she takes Gabapentin 100 BID which is effective. States it is worse with increased walking. Is able to exercise per pt daily for the last 2 weeks.

## 2022-07-08 NOTE — ASSESSMENT & PLAN NOTE
Chronic, stable. 130/74 in clinic. Currently on Amlodipine 10 mg and Carvedilol 6.25 twice daily.
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
## 2022-07-08 NOTE — ASSESSMENT & PLAN NOTE
Chronic, history of chronic kidney disease back to the thousand 16.  Most recent GFR in January 2022 was at 33.  Currently not seeing nephrology, states that she took too much Aleve when she was younger for back pain.  She is aware to avoid NSAIDs as well as staying well-hydrated.  We will continue to monitor

## 2022-07-12 ENCOUNTER — TELEPHONE (OUTPATIENT)
Dept: SOCIAL WORK | Facility: CLINIC | Age: 77
End: 2022-07-12

## 2022-08-05 ENCOUNTER — HOSPITAL ENCOUNTER (OUTPATIENT)
Dept: LAB | Facility: MEDICAL CENTER | Age: 77
End: 2022-08-05
Attending: FAMILY MEDICINE
Payer: MEDICARE

## 2022-08-05 DIAGNOSIS — M10.9 GOUT, UNSPECIFIED CAUSE, UNSPECIFIED CHRONICITY, UNSPECIFIED SITE: ICD-10-CM

## 2022-08-05 DIAGNOSIS — R73.09 ELEVATED GLUCOSE: ICD-10-CM

## 2022-08-05 DIAGNOSIS — Z00.00 HEALTHCARE MAINTENANCE: ICD-10-CM

## 2022-08-05 LAB
ALBUMIN SERPL BCP-MCNC: 4 G/DL (ref 3.2–4.9)
ALBUMIN/GLOB SERPL: 1.1 G/DL
ALP SERPL-CCNC: 115 U/L (ref 30–99)
ALT SERPL-CCNC: 7 U/L (ref 2–50)
ANION GAP SERPL CALC-SCNC: 13 MMOL/L (ref 7–16)
AST SERPL-CCNC: 14 U/L (ref 12–45)
BASOPHILS # BLD AUTO: 0.6 % (ref 0–1.8)
BASOPHILS # BLD: 0.05 K/UL (ref 0–0.12)
BILIRUB SERPL-MCNC: 0.5 MG/DL (ref 0.1–1.5)
BUN SERPL-MCNC: 31 MG/DL (ref 8–22)
CALCIUM SERPL-MCNC: 9.1 MG/DL (ref 8.5–10.5)
CHLORIDE SERPL-SCNC: 104 MMOL/L (ref 96–112)
CHOLEST SERPL-MCNC: 151 MG/DL (ref 100–199)
CO2 SERPL-SCNC: 17 MMOL/L (ref 20–33)
CREAT SERPL-MCNC: 2.03 MG/DL (ref 0.5–1.4)
EOSINOPHIL # BLD AUTO: 0.27 K/UL (ref 0–0.51)
EOSINOPHIL NFR BLD: 3 % (ref 0–6.9)
ERYTHROCYTE [DISTWIDTH] IN BLOOD BY AUTOMATED COUNT: 48 FL (ref 35.9–50)
EST. AVERAGE GLUCOSE BLD GHB EST-MCNC: 117 MG/DL
GFR SERPLBLD CREATININE-BSD FMLA CKD-EPI: 25 ML/MIN/1.73 M 2
GLOBULIN SER CALC-MCNC: 3.8 G/DL (ref 1.9–3.5)
GLUCOSE SERPL-MCNC: 78 MG/DL (ref 65–99)
HBA1C MFR BLD: 5.7 % (ref 4–5.6)
HCT VFR BLD AUTO: 38 % (ref 37–47)
HDLC SERPL-MCNC: 46 MG/DL
HGB BLD-MCNC: 12.4 G/DL (ref 12–16)
IMM GRANULOCYTES # BLD AUTO: 0.03 K/UL (ref 0–0.11)
IMM GRANULOCYTES NFR BLD AUTO: 0.3 % (ref 0–0.9)
LDLC SERPL CALC-MCNC: 87 MG/DL
LYMPHOCYTES # BLD AUTO: 4.35 K/UL (ref 1–4.8)
LYMPHOCYTES NFR BLD: 48.4 % (ref 22–41)
MCH RBC QN AUTO: 31.6 PG (ref 27–33)
MCHC RBC AUTO-ENTMCNC: 32.6 G/DL (ref 33.6–35)
MCV RBC AUTO: 96.7 FL (ref 81.4–97.8)
MONOCYTES # BLD AUTO: 0.65 K/UL (ref 0–0.85)
MONOCYTES NFR BLD AUTO: 7.2 % (ref 0–13.4)
NEUTROPHILS # BLD AUTO: 3.64 K/UL (ref 2–7.15)
NEUTROPHILS NFR BLD: 40.5 % (ref 44–72)
NRBC # BLD AUTO: 0 K/UL
NRBC BLD-RTO: 0 /100 WBC
PLATELET # BLD AUTO: 215 K/UL (ref 164–446)
PMV BLD AUTO: 11.5 FL (ref 9–12.9)
POTASSIUM SERPL-SCNC: 4.5 MMOL/L (ref 3.6–5.5)
PROT SERPL-MCNC: 7.8 G/DL (ref 6–8.2)
RBC # BLD AUTO: 3.93 M/UL (ref 4.2–5.4)
SODIUM SERPL-SCNC: 134 MMOL/L (ref 135–145)
TRIGL SERPL-MCNC: 90 MG/DL (ref 0–149)
URATE SERPL-MCNC: 5.6 MG/DL (ref 1.9–8.2)
WBC # BLD AUTO: 9 K/UL (ref 4.8–10.8)

## 2022-08-05 PROCEDURE — 86803 HEPATITIS C AB TEST: CPT

## 2022-08-05 PROCEDURE — 80053 COMPREHEN METABOLIC PANEL: CPT

## 2022-08-05 PROCEDURE — 80061 LIPID PANEL: CPT

## 2022-08-05 PROCEDURE — 85025 COMPLETE CBC W/AUTO DIFF WBC: CPT

## 2022-08-05 PROCEDURE — 84550 ASSAY OF BLOOD/URIC ACID: CPT

## 2022-08-05 PROCEDURE — 83036 HEMOGLOBIN GLYCOSYLATED A1C: CPT

## 2022-08-05 PROCEDURE — 84443 ASSAY THYROID STIM HORMONE: CPT

## 2022-08-05 PROCEDURE — 82306 VITAMIN D 25 HYDROXY: CPT

## 2022-08-05 PROCEDURE — 36415 COLL VENOUS BLD VENIPUNCTURE: CPT

## 2022-08-06 LAB
25(OH)D3 SERPL-MCNC: 22 NG/ML (ref 30–100)
HCV AB SER QL: NORMAL
TSH SERPL DL<=0.005 MIU/L-ACNC: 1.27 UIU/ML (ref 0.38–5.33)

## 2022-08-09 ENCOUNTER — TELEPHONE (OUTPATIENT)
Dept: MEDICAL GROUP | Facility: PHYSICIAN GROUP | Age: 77
End: 2022-08-09
Payer: MEDICARE

## 2022-08-09 NOTE — TELEPHONE ENCOUNTER
Phone Number Called: [phone] (home) [phone] (work)      Call outcome: Spoke to patient regarding message below.    Message: Informed of lab results and pt will discuss at apt.

## 2022-08-09 NOTE — TELEPHONE ENCOUNTER
----- Message from ROCIO Feng sent at 8/9/2022  7:56 AM PDT -----  Can you please call Mimi and let her know her lab results are back and let her know the following:  Her vitamin D is still below the normal range please make sure she is taking vitamin D3 at least 2000 units daily  Her kidney function remains low, her most recent GFR was at 25 so to be sure that great question she is avoiding NSAIDs such as Aleve or ibuprofen address, and making sure she stays well-hydrated however her sodium was slightly low so I would like to make sure that she only has about 4 cups of water daily and then she can have other liquids such as sugar-free drinks to help maintain hydration however if she is drinking a lot of free water a day it can dilute the sodium causing it to be slightly low which I think may be the case.  Her A1c is at 5.7 still in a prediabetic range.  Please let her know we will discuss these results in person at her next appointment.    Thank you

## 2022-08-16 ENCOUNTER — OFFICE VISIT (OUTPATIENT)
Dept: MEDICAL GROUP | Facility: PHYSICIAN GROUP | Age: 77
End: 2022-08-16
Payer: MEDICARE

## 2022-08-16 VITALS
HEART RATE: 74 BPM | TEMPERATURE: 97.6 F | WEIGHT: 246.7 LBS | SYSTOLIC BLOOD PRESSURE: 130 MMHG | RESPIRATION RATE: 16 BRPM | OXYGEN SATURATION: 97 % | BODY MASS INDEX: 41.1 KG/M2 | DIASTOLIC BLOOD PRESSURE: 82 MMHG | HEIGHT: 65 IN

## 2022-08-16 DIAGNOSIS — F33.40 MDD (RECURRENT MAJOR DEPRESSIVE DISORDER) IN REMISSION (HCC): ICD-10-CM

## 2022-08-16 DIAGNOSIS — G89.29 NECK PAIN, CHRONIC: ICD-10-CM

## 2022-08-16 DIAGNOSIS — M54.2 NECK PAIN, CHRONIC: ICD-10-CM

## 2022-08-16 DIAGNOSIS — N18.4 STAGE 4 CHRONIC KIDNEY DISEASE (HCC): ICD-10-CM

## 2022-08-16 DIAGNOSIS — M54.32 SCIATICA OF LEFT SIDE: ICD-10-CM

## 2022-08-16 DIAGNOSIS — Z60.2 ELDERLY PERSON LIVING ALONE: ICD-10-CM

## 2022-08-16 PROCEDURE — 99215 OFFICE O/P EST HI 40 MIN: CPT | Performed by: FAMILY MEDICINE

## 2022-08-16 RX ORDER — GABAPENTIN 100 MG/1
200 CAPSULE ORAL 2 TIMES DAILY
Qty: 200 CAPSULE | Refills: 3 | Status: SHIPPED | OUTPATIENT
Start: 2022-08-16 | End: 2023-08-22

## 2022-08-16 RX ORDER — METHYLPREDNISOLONE 4 MG/1
TABLET ORAL
Qty: 21 TABLET | Refills: 0 | Status: SHIPPED | OUTPATIENT
Start: 2022-08-16 | End: 2023-01-18

## 2022-08-16 RX ORDER — CHLORHEXIDINE GLUCONATE ORAL RINSE 1.2 MG/ML
15 SOLUTION DENTAL 2 TIMES DAILY
Qty: 473 ML | Refills: 0 | Status: SHIPPED | OUTPATIENT
Start: 2022-08-16 | End: 2023-01-18 | Stop reason: SDUPTHER

## 2022-08-16 SDOH — SOCIAL STABILITY - SOCIAL INSECURITY: PROBLEMS RELATED TO LIVING ALONE: Z60.2

## 2022-08-16 ASSESSMENT — FIBROSIS 4 INDEX: FIB4 SCORE: 1.85

## 2022-08-16 ASSESSMENT — PATIENT HEALTH QUESTIONNAIRE - PHQ9
5. POOR APPETITE OR OVEREATING: 1 - SEVERAL DAYS
CLINICAL INTERPRETATION OF PHQ2 SCORE: 0

## 2022-08-16 ASSESSMENT — PAIN SCALES - GENERAL: PAINLEVEL: 10=SEVERE PAIN

## 2022-08-16 NOTE — ASSESSMENT & PLAN NOTE
Chronic, in the past had taken Wellbutrin and Effexor were for depression.  No past suicide attempts, have had some ideations in the past when stopping venlafaxine.  Currently she is not on antidepressants and her PHQ-9 is at 0 today in clinic.  States that her pain is bothersome however currently not depressed at this time.

## 2022-08-16 NOTE — PROGRESS NOTES
Subjective:     CC:   Chief Complaint   Patient presents with    Results     08/05/22    Hip Pain     L, x 1 month got worse,        HPI:   Mimi presents today with  worsening L hip pain.     Sciatica of left side  Chronic, worsening over the last month.  States she had a fall 2 months ago and since then she has had a flareup of her sciatica on the left side.  She is currently on Tylenol 650 mg tablets, averaging about 3 tablets daily however states that she feels like it wears off quickly.  She has tried massaging it, heat packs, cool therapy in the past which she felt was ineffective.  Currently she was taking gabapentin 100 mg twice daily which was working in the past however no longer effective at this time.  Due to her severely decreased kidney function she cannot take NSAIDS I will give her a Medrol Dosepak to assist in decreasing her sciatica flareup and we will increase her gabapentin to 200 mg twice daily.  We will continue to monitor.    MDD (recurrent major depressive disorder) in remission (HCC)  Chronic, in the past had taken Wellbutrin and Effexor were for depression.  No past suicide attempts, have had some ideations in the past when stopping venlafaxine.  Currently she is not on antidepressants and her PHQ-9 is at 0 today in clinic.  States that her pain is bothersome however currently not depressed at this time.    Stage 4 chronic kidney disease (HCC)  Chronic, history of chronic kidney disease back to 2016.  Most recent GFR in January was at 33, most recent now at 25.  Currently not seeing nephrology, states that she took too much Aleve when she was younger for her back pain.  She is currently aware not to take NSAIDs which she is avoiding and staying well hydrated.  We will continue to monitor her kidney function routinely.      Current Outpatient Medications Ordered in Epic   Medication Sig Dispense Refill    gabapentin (NEURONTIN) 100 MG Cap Take 2 Capsules by mouth 2 times a day. 200 Capsule  "3    chlorhexidine (PERIDEX) 0.12 % Solution Take 15 mL by mouth 2 times a day. 473 mL 0    methylPREDNISolone (MEDROL DOSEPAK) 4 MG Tablet Therapy Pack As directed on the packaging label. 21 Tablet 0    aspirin EC (ECOTRIN) 81 MG Tablet Delayed Response Take 81 mg by mouth every day.      rosuvastatin (CRESTOR) 5 MG Tab Take 1 Tablet by mouth every evening. 30 Tablet 0    carvedilol (COREG) 6.25 MG Tab Take 1 Tablet by mouth 2 times a day with meals. 200 Tablet 3    amLODIPine (NORVASC) 10 MG Tab Take 1 Tablet by mouth every day. 100 Tablet 3    cetirizine (ZYRTEC) 10 MG Tab Take 1 Tablet by mouth every day. 100 Tablet 3    acetaminophen (TYLENOL) 650 MG CR tablet ACETAMINOPHEN  MG CR-TABS      allopurinol (ZYLOPRIM) 100 MG Tab        No current Epic-ordered facility-administered medications on file.       Health Maintenance: Completed        Objective:     Exam:  /82 (BP Location: Right arm, Patient Position: Sitting, BP Cuff Size: Small adult)   Pulse 74   Temp 36.4 °C (97.6 °F) (Temporal)   Resp 16   Ht 1.651 m (5' 5\")   Wt 112 kg (246 lb 11.2 oz)   SpO2 97%   BMI 41.05 kg/m²  Body mass index is 41.05 kg/m².    Physical Exam  Vitals reviewed.   Constitutional:       General: She is not in acute distress.     Appearance: Normal appearance.   Eyes:      Conjunctiva/sclera: Conjunctivae normal.      Pupils: Pupils are equal, round, and reactive to light.   Cardiovascular:      Rate and Rhythm: Normal rate and regular rhythm.      Pulses: Normal pulses.      Heart sounds: Normal heart sounds. No murmur heard.  Pulmonary:      Effort: Pulmonary effort is normal. No respiratory distress.      Breath sounds: Normal breath sounds. No stridor. No wheezing, rhonchi or rales.   Chest:      Chest wall: No tenderness.   Abdominal:      General: Bowel sounds are normal.   Musculoskeletal:      Right lower leg: No edema.      Left lower leg: Tenderness present. No edema.      Comments: Pain with palpation " over L sciatica and bursa    Neurological:      Mental Status: She is alert and oriented to person, place, and time.   Psychiatric:         Mood and Affect: Mood normal.         Behavior: Behavior normal.        A chaperone was offered to the patient during today's exam. Patient declined chaperone.        Assessment & Plan:     76 y.o. female with the following -     1. Neck pain, chronic  - gabapentin (NEURONTIN) 100 MG Cap; Take 2 Capsules by mouth 2 times a day.  Dispense: 200 Capsule; Refill: 3    2. Sciatica of left side  Chronic, worse since a fall 2 months ago.  Due to her chronic kidney disease she cannot take NSAIDs, currently taking Tylenol and also increased her gabapentin as well as give her a Medrol Dosepak for her sciatica flare.  Referral sent to physiatry for ongoing management she may need trigger point injections other medication management.  - Referral to Pain Clinic    3. Elderly person living alone  States that she is thinking about moving out of her home into an independent or assisted living, referral to care management placed for assistance.  - REFERRAL TO CARE MANAGEMENT    4. MDD (recurrent major depressive disorder) in remission (HCC)  Chronic, stable PHQ-9 0 in clinic today and is currently not on medication management for this.  5. Stage 4 chronic kidney disease (HCC)  [facility], continues to have declined kidney function, most recent GFR at 25.  We will recheck in about a month to make sure this has been maintained.  Discussed increasing her hydration however currently defers nephrology at this time.  Other orders  - chlorhexidine (PERIDEX) 0.12 % Solution; Take 15 mL by mouth 2 times a day.  Dispense: 473 mL; Refill: 0  - methylPREDNISolone (MEDROL DOSEPAK) 4 MG Tablet Therapy Pack; As directed on the packaging label.  Dispense: 21 Tablet; Refill: 0     I spent a total of 42 minutes with record review, exam, communication with the patient, communication with other providers, and  documentation of this encounter.      Return in about 3 months (around 11/16/2022) for f/u on sciatica pain  .    Please note that this dictation was created using voice recognition software. I have made every reasonable attempt to correct obvious errors, but I expect that there are errors of grammar and possibly content that I did not discover before finalizing the note.

## 2022-08-16 NOTE — ASSESSMENT & PLAN NOTE
Chronic, worsening over the last month.  States she had a fall 2 months ago and since then she has had a flareup of her sciatica on the left side.  She is currently on Tylenol 650 mg tablets, averaging about 3 tablets daily however states that she feels like it wears off quickly.  She has tried massaging it, heat packs, cool therapy in the past which she felt was ineffective.  Currently she was taking gabapentin 100 mg twice daily which was working in the past however no longer effective at this time.  Due to her severely decreased kidney function she cannot take NSAIDS I will give her a Medrol Dosepak to assist in decreasing her sciatica flareup and we will increase her gabapentin to 200 mg twice daily.  We will continue to monitor.

## 2022-08-16 NOTE — ASSESSMENT & PLAN NOTE
Chronic, history of chronic kidney disease back to 2016.  Most recent GFR in January was at 33, most recent now at 25.  Currently not seeing nephrology, states that she took too much Aleve when she was younger for her back pain.  She is currently aware not to take NSAIDs which she is avoiding and staying well hydrated.  We will continue to monitor her kidney function routinely.

## 2022-08-18 ENCOUNTER — PATIENT OUTREACH (OUTPATIENT)
Dept: HEALTH INFORMATION MANAGEMENT | Facility: OTHER | Age: 77
End: 2022-08-18
Payer: MEDICARE

## 2022-08-18 SDOH — ECONOMIC STABILITY: TRANSPORTATION INSECURITY
IN THE PAST 12 MONTHS, HAS THE LACK OF TRANSPORTATION KEPT YOU FROM MEDICAL APPOINTMENTS OR FROM GETTING MEDICATIONS?: NO

## 2022-08-18 SDOH — ECONOMIC STABILITY: TRANSPORTATION INSECURITY
IN THE PAST 12 MONTHS, HAS LACK OF TRANSPORTATION KEPT YOU FROM MEETINGS, WORK, OR FROM GETTING THINGS NEEDED FOR DAILY LIVING?: YES

## 2022-08-18 NOTE — PROGRESS NOTES
"SW call to pt in response to [facility] referral.  Pt reports that she has been struggling with severe pain due to Sciatica, especially this past week, she was unable to sleep for three nights due to pain.  She states that she has had issue for very long time, but this episode \"scared her\" because it was so bad.  She is having difficulty completing tasks, lives alone, no longer drives and her daughter in [place] is unable to assist her because of her own health issues.  Pt also has daughter in [place], but too far away to offer support.  Pt states \"it is hard getting old\".  Pt has homemake services at one time, but terminated them didn't feel needed at the time and caregiver was not doing a good job.  Pt would like to have help again.  Pt also needing alternative transportation that could take to various places other than just to medical appointments. Pt also receptive to food resources to help reduce monthly costs.  MANI will assist pt in completing [employer] application, will provide Food Bank list, register her for [employer] Program. MANI placed referral for pt to [facility] for [facility] Mobility Program and will provide her with Free Uber ride and [facility] transportation services information.  MANI will meet with pt at her home on 8/23/2022 at 2:00 PM to complete [employer] application and go over resources.   "

## 2022-08-23 ENCOUNTER — PATIENT OUTREACH (OUTPATIENT)
Dept: HEALTH INFORMATION MANAGEMENT | Facility: OTHER | Age: 77
End: 2022-08-23
Payer: MEDICARE

## 2022-08-23 NOTE — PROGRESS NOTES
MANI and CHW Madeline made home visit. Pt shares that it is getting more difficulty to get around, she has had falls.   Pt is using motorized wheelchair, but it has been shaking, some issue with it and she is afraid to continue using it.  She has contacted [facility], but they did not have a replacement at this time.  She will have to wait until they get another one.  She also uses walker and cane inside the home, and walker when she leaves the home.  Pt's primary need is transportation so that she will no longer have to pay for uber or taxi.  SW contacted [facility] and pt is in their system, information updated and she can begin scheduling her rides.  Pt was informed of this and that the days she can schedule are Tues, Thurs, Fri.  She can also go to grocery store twice per month.  MANI also gave pt Free uber ride handout and she was informed that SW place referral for [facility] Mobility Program. SW and pt accomplished goal of completing application for [employer] to address home care needs.  MANI sent completed application to the agency. MANI placed referral for [employer] volunteer program as pt identified isolation as issue/ would like to have visitor or someone to go on outings with in community. MANI assisted pt in making eye appointment at [facility] for 10/19 4:00 PM. Pt needs dental appointment, but resource provided was [facility] who only takes children now.  Pt will need alternative resource for dental care.  MANI will pass pt's case to CHW for follow up to ensure resource needs have been met, [employer] application is processed assigned to  and provide general support.

## 2022-09-06 ENCOUNTER — PATIENT OUTREACH (OUTPATIENT)
Dept: HEALTH INFORMATION MANAGEMENT | Facility: OTHER | Age: 77
End: 2022-09-06
Payer: MEDICARE

## 2022-09-06 NOTE — PROGRESS NOTES
9/6/22    BRIANA Correa received a call from patient needing some help with renewing her 's license. CHW offered to come see her tomorrow and help her with making a DMV account renew her license online instead of in person. Patient also needed help with paying the phone bill and wanted to make sure that she was paying it correctly. CHW checked in with patient about her motorized wheelchair and patient said that she is still having trouble with the seat of the wheelchair, so CHW will check tomorrow during the home visit. CHW asked if transportation resources that were given to patient by MANI Shafer were utilized and patient reported that she used the resources and had no trouble doing so. CHW will call Aging and Disability before patient home visit to check application status and see if patient has been assigned to . BRIANA Correa will see patient in person and help with license renewal, paying phone bill, check wheelchair, and scheduling dental appointment.

## 2022-09-07 ENCOUNTER — PATIENT OUTREACH (OUTPATIENT)
Dept: HEALTH INFORMATION MANAGEMENT | Facility: OTHER | Age: 77
End: 2022-09-07
Payer: MEDICARE

## 2022-09-07 NOTE — PROGRESS NOTES
9/7/22    BRIANA Correa went to visit patient in her home to assist with scheduling DMV license renewal. CHW helped with scheduling her appointment online and has an appointment in November. CHW also helped with helping patient set up her email account on her phone so she will be able to see her appointments. CHW will contact patient's insurance to schedule dental appointment, as patient reports she wants an extraction and dentures. CHW will contact patient once dental appointment is scheduled.     CHW has called [employer] and they will call back to ensure that the application has been received for homemaker services.     [employer] called CHW back and have received application and will be assigned to  as soon as the application is reviewed. The  who will be working with patient's case will be contacting the patient directly.

## 2022-09-09 ENCOUNTER — PATIENT OUTREACH (OUTPATIENT)
Dept: HEALTH INFORMATION MANAGEMENT | Facility: OTHER | Age: 77
End: 2022-09-09
Payer: MEDICARE

## 2022-09-09 NOTE — PROGRESS NOTES
9/9/22    CHW Madeline called patient with dentist information that works with her insurance. CHW will mail out packet with dentists that the patient can call to schedule her appointment for dentures and two teeth extractions. Patient has a few preferences in regards to her new dentist, so CHW will highlight dentists that will be convenient for her to get to and match her preferences. Patient reported that she got a new walker that is more steady for her and states that it will be easier for her to get around with the walker instead of the wheelchair that she uses. Patient requested CHW to send [facility] Transportation resource so patient can use resource for her next appointment with Pain Management. CHW will follow up to ensure patient has received mailed resources and tried to schedule her own dentist appointment.

## 2022-09-12 LAB
APOB+LDLR+PCSK9 GENE MUT ANL BLD/T: NOT DETECTED
BRCA1+BRCA2 DEL+DUP + FULL MUT ANL BLD/T: NOT DETECTED
MLH1+MSH2+MSH6+PMS2 GN DEL+DUP+FUL M: NOT DETECTED

## 2022-09-13 ENCOUNTER — PATIENT OUTREACH (OUTPATIENT)
Dept: HEALTH INFORMATION MANAGEMENT | Facility: OTHER | Age: 77
End: 2022-09-13
Payer: MEDICARE

## 2022-09-13 NOTE — PROGRESS NOTES
9/13/22    BRIANA Viramontes received a call from patient and patient was hesitant on going to her appointment. CHW encouraged her to still go to her appointment to voice her concerns with her doctor. Patient slowly agreed and will still go to the appointment on 9/19 with Dr. Garcia. Patient needed assistance with transportation and CHW helped her find the transportation resource that was given to patient by MANI Shafer. Patient will give transportation resource a call to schedule their ride to and from the appointment.       Patient called CHW again to report that the transportation is booked up and she can't get an ride on that day. CHW ensured patient that she will look into another option for patient to try so she can go to her appointment.       CHW called patient back and referred patient to the  [facility] line. CHW instructed patient to call the number and they can assist with scheduling ride to and from the appointment. Patient reported that they will contact BRIANA Viramontes again if they cannot schedule their transportation.

## 2022-09-14 ENCOUNTER — TELEPHONE (OUTPATIENT)
Dept: MEDICAL GROUP | Facility: PHYSICIAN GROUP | Age: 77
End: 2022-09-14
Payer: MEDICARE

## 2022-09-14 NOTE — TELEPHONE ENCOUNTER
Phone Number Called: [phone] (home) [phone] (work)      Call outcome: Spoke to patient regarding message below.    Message: Informed of DNA results. Pt had not other questions at this.

## 2022-09-14 NOTE — TELEPHONE ENCOUNTER
----- Message from ROCIO Feng sent at 9/14/2022  7:43 AM PDT -----  Can somebody please let Mimi know that her DNA analysis panel that she had done through [facility] came back that she had no mutations detected.  Please let me know if she has any questions regarding this.

## 2022-09-15 ENCOUNTER — PATIENT OUTREACH (OUTPATIENT)
Dept: HEALTH INFORMATION MANAGEMENT | Facility: OTHER | Age: 77
End: 2022-09-15
Payer: MEDICARE

## 2022-09-15 NOTE — PROGRESS NOTES
9/15/22    CHW Wander received call from patient in the afternoon. Patient wanted to let the CHW know that she has transportation scheduled and will be able to go to the appointment on time. Patient also mentioned that she lost her insurance card and will get another one mailed to her in two weeks. Patient asked if she needed her insurance card at the new appointment on Monday. W will give patient another phone call on Friday/Monday to confirm that her insurance cards are all scanned in, but in case will give her the insurance policy number.

## 2022-09-16 ENCOUNTER — PATIENT OUTREACH (OUTPATIENT)
Dept: HEALTH INFORMATION MANAGEMENT | Facility: OTHER | Age: 77
End: 2022-09-16
Payer: MEDICARE

## 2022-09-19 ENCOUNTER — APPOINTMENT (OUTPATIENT)
Dept: PHYSICAL MEDICINE AND REHAB | Facility: MEDICAL CENTER | Age: 77
End: 2022-09-19
Payer: MEDICARE

## 2022-09-19 NOTE — PROGRESS NOTES
9/16/22    CHW received call from patient with questions about dentist list that was supplied by CHW. Patient was worried that none of those dentists would be able to work with her insurance and availability. CHW reassured patient that the list of dentists that were given were all one that matched her preferences, will take her insurance, and are near patient's place of residence. CHW told patient to give those dentists a call and let patient know that if that list of dentist does not work, that she will send over another list that she can also contact. CHW will follow up with patient if patient calls to say that none of the dentists can help her.     9/19/22 @8:30AM     CHW received two voicemails from patient stating that her transportation for her would be arriving much earlier than she wanted it to. CHW gave patient a call and left a voicemail to try and help with moving her transportation pickup.     @1:30PM  CHW received a call from patient stating that she will not be going to her appointment with Dr. Garcia. Patient reported that [employer] that she called to pick her up came to her door at 1:30AM at night instead of in the afternoon for her appointment. Patient insists that she never booked her own transportation to the appointment, but there was no documentation in chart that states that anyone else helped her. CHW had a note that patient called on 9/15 stating that patient reported to set up her transportation to the appointment, so patient may have forgotten she scheduled it herself. CHW encouraged patient to reschedule her appointment with Dr. Garcia, though patient did not want to reschedule and will discuss it with her PCP during her appointment in November. Patient did successfully book an appointment with new dentist in October. CHW will call Dr. Garcia's office to see if there was any issues and if patient successfully canceled her appointment.     @2:00   CHW called Dr. Garcia's office to confirm  the cancellation of patient's appointment. Scheduling mentioned that patient canceled her appointment because patient reported that she did not want to get dressed and did not want to see anyone. CHW will follow up with a phone call to patient before handing off the case to another CHW/SW next week. CHW will update patient about [employer] application and will help with any other resources she may need before case gets handed off.

## 2022-09-19 NOTE — PROGRESS NOTES
New patient note    Interventional spine and Pain  Physiatry (physical medicine and  Rehabilitation)     Date of service: See epic    Chief complaint: No chief complaint on file.       Referring provider: Ofelia Wayne A.P.* ***    HISTORY    HPI: Mimi Appiah 76 y.o.  who presents today with There were no encounter diagnoses.    HPI    ***       Medical records review:  I reviewed the note from the referring provider Ofelia Wayne A.P.* including the note dated ***.           ROS:   Red Flags ROS: ***  Fever, Chills, Sweats: Denies  Involuntary Weight Loss: Denies  Bladder Incontinence: Denies  Bowel Incontinence: denies  Saddle Anesthesia: Denies    All other systems reviewed and negative.       PMHx:   Past Medical History:   Diagnosis Date    Abnormal Pap smear     Abnormal Pap smear 04/11/2012    1970 precancerous lesions treated with cyro    Abnormality of hormone 08/14/2020    Allergic rhinitis     BMI 40.0-44.9, adult (HCC) 07/01/2022    Congestion of nasal sinus 1/7/2020    Encounter for screening for malignant neoplasm of breast 04/11/2012    Preventative Health (Women): Tetanus - ? Pneumovax - 2005  Flu shot - 10/12 Stool - 2005  Pap Smear - 2009, precancer of cervix 1970 - treated with cryo.  Decline further Mammogram - none Breast Exam - none Dexa Scan - none Colonscopy - 2005, was done in [facility].  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 200 Capsule 3    chlorhexidine (PERIDEX) 0.12 % Solution Take 15 mL by mouth 2 times a day. 473 mL 0    methylPREDNISolone (MEDROL DOSEPAK) 4 MG Tablet Therapy Pack As directed on the packaging label. 21 Tablet 0    aspirin EC (ECOTRIN) 81 MG Tablet Delayed Response Take 81 mg by mouth every day.      rosuvastatin (CRESTOR) 5 MG Tab Take 1 Tablet by mouth every evening. 30 Tablet 0    carvedilol (COREG) 6.25 MG Tab Take 1 Tablet by mouth 2 times a day with meals. 200 Tablet 3    amLODIPine (NORVASC) 10 MG Tab Take 1 Tablet by mouth every day. 100 Tablet 3    cetirizine (ZYRTEC) 10 MG Tab Take 1 Tablet by mouth every day. 100 Tablet 3    acetaminophen (TYLENOL) 650 MG CR tablet ACETAMINOPHEN  MG CR-TABS      allopurinol (ZYLOPRIM) 100 MG Tab        No current facility-administered medications on file prior to visit.        PSHx:   Past Surgical History:   Procedure Laterality Date    TUBAL COAGULATION LAPAROSCOPIC BILATERAL         Family history   Family History   Problem Relation Age of Onset    Stroke Father     Alcohol abuse Father     Cancer Sister     Cancer Brother          Medications: reviewed on [place] [employer].   No outpatient medications have been marked as taking for the 9/19/22 encounter (Appointment) with Christian Garcia M.D..        Allergies:   Allergies   Allergen Reactions    Penicillins Swelling     Other reaction(s): head and face swelling    Risperdal [Benzoic Acid-Risperidone]      Other reaction(s): bad reaction all over body    Lima Beans Swelling     Pt gets swollen lips with butter and lima beans.      Peach [Prunus Persica] Nausea     Pt gets nauseous with peaches    Atenolol Swelling     Only at doses above 50mg    Chlorpheniramine        Social Hx:   Social History     Socioeconomic History    Marital status:       Spouse name: Not on file    Number of children: Not on file    Years of education: Not on file    Highest education level: Not on file   Occupational History    Not on file   Tobacco Use    Smoking status: Some Days     Packs/day: 0.50     Types: Cigarettes    Smokeless tobacco: Never   Vaping Use    Vaping Use: Never used   Substance and Sexual Activity    Alcohol use: No    Drug use: No    Sexual activity: Not on file   Other Topics Concern    Not on file   Social History Narrative    Lives alone, 7 children and 41 grandchildren. States one son  at 19 in car accident. Retired CNA     States her daughter Alisa is her neighbor.     Social Determinants of Health     Financial Resource Strain: Not on file   Food Insecurity: Not on file   Transportation Needs: Unmet Transportation Needs    Lack of Transportation (Medical): No    Lack of Transportation (Non-Medical): Yes   Physical Activity: Not on file   Stress: Not on file   Social Connections: Not on file   Intimate Partner Violence: Not on file   Housing Stability: Not on file         EXAMINATION     Physical Exam:   Vitals: There were no vitals taken for this visit.    Constitutional:   Body Habitus: There is no height or weight on file to calculate BMI.  Cooperation: Fully cooperates with exam  Appearance: Well-groomed, well-nourished, not disheveled ***    Eyes: No scleral icterus to suggest severe liver disease, no proptosis to suggest severe hyperthyroid    ENT -no obvious auditory deficits, no obvious tongue lesions, tongue midline, no facial droop     Skin -no rashes or lesions noted     Respiratory-  breathing comfortable on room air, no audible wheezing    Cardiovascular- capillary refills less than 2 seconds.     Psychiatric- alert and oriented ×3. Normal affect.     Gait - normal gait, no use of ambulatory device, nonantalgic. {ckwalkin}. ***    Musculoskeletal and Neuro -       Cervical spine   Inspection: No deformities of the  skin over the cervical spine. No rashes or lesions.    {ck rom:98520}  active range of motion in all directions, {w-w/o:5700}  pain      Spurling’s sign: {ck r/l positive:20693}    No*** signs of muscular atrophy in bilateral upper extremities     ***No tenderness to palpation of the cervical spine    Positive for tenderness to palpation on the {ckrightleft:57581} side in the cervical facets.       Key points for the international standards for neurological classification of spinal cord injury (ISNCSCI) to light touch.     Dermatome R L   C4 2*** 2   C5 2 2   C6 2 2   C7 2 2   C8 2 2   T1 2 2   T2 2 2                                     Motor Exam Upper Extremities   ? Myotome R L   Shoulder flexion C5 ***5 5   Elbow flexion C5 5 5   Wrist extension C6 5 5   Elbow extension C7 5 5   Finger flexion C8 5 5   Finger abduction T1 5 5     Reflexes  ?  R L   Biceps  2+*** 2+   Brachioradialis  2+ 2+     Kwok’s sign {ck r/l positive:20693}            Thoracic/Lumbar Spine/Sacral Spine/Hips   Inspection: No*** evidence of atrophy in bilateral lower extremities throughout     ROM: {ck rom:31132} active range of motion with flexion, lateral flexion, and rotation bilaterally.   There is {ck rom:84162} active range of motion with lumbar extension {w-w/o:5700} pain.    There {IS/IS NO:18836} pain with facet loading maneuver (extension rotation) with axial low back pain on the {ckrightleft:87786} side(s)    Palpation:   {ckttplumbar:20983}  palpation over SI joint: {ck r/l positive:20693}    palpation in hip or over the gluteus medius tendon insertion: {ck r/l positive:50554}      Lumbar spine Special tests  Neuro tension  Straight leg test {ck r/l positive:20693}    Slump test {ck r/l positive:51455}      HIP  FAIR test {ck r/l positive:20693}    Range of motion in the RIGHT hip is {ck rom:35408} in flexion, extension, abduction, internal rotation, external rotation.  Range of motion in the LEFT hip is {ck rom:70876} in  flexion, extension, abduction, internal rotation, external rotation.      SI joint tests  Observation patient sits on one buttocks: Negative***  SI joint compression {ck r/l positive:77790}    SI joint distraction {ck r/l positive:07191}    Thigh thrust test {ck r/l positive:04223}    OMAR test {ck r/l positive:58967}                 Key points for the international standards for neurological classification of spinal cord injury (ISNCSCI) to light touch.     Dermatome R L                                      L2 2*** 2   L3 2 2   L4 2 2   L5 2 2   S1 2 2   S2 2 2       Motor Exam Lower Extremities    ? Myotome R L   Hip flexion L2 ***5 5   Knee extension L3 5 5   Ankle dorsiflexion L4 5 5   Toe extension L5 5 5   Ankle plantarflexion S1 5 5         Reflexes  ?  R L                Patella  2+ 2+   Achilles   2+ 2+       Babinski sign {ck r/l positive:40709}   Clonus of the ankle {ck r/l positive:36719}       MEDICAL DECISION MAKING    Medical records review: see under HPI section.     DATA    Labs: ***  Lab Results   Component Value Date/Time    SODIUM 134 (L) 08/05/2022 12:52 PM    POTASSIUM 4.5 08/05/2022 12:52 PM    CHLORIDE 104 08/05/2022 12:52 PM    CO2 17 (L) 08/05/2022 12:52 PM    ANION 13.0 08/05/2022 12:52 PM    GLUCOSE 78 08/05/2022 12:52 PM    BUN 31 (H) 08/05/2022 12:52 PM    CREATININE 2.03 (H) 08/05/2022 12:52 PM    CALCIUM 9.1 08/05/2022 12:52 PM    ASTSGOT 14 08/05/2022 12:52 PM    ALTSGPT 7 08/05/2022 12:52 PM    TBILIRUBIN 0.5 08/05/2022 12:52 PM    ALBUMIN 4.0 08/05/2022 12:52 PM    TOTPROTEIN 7.8 08/05/2022 12:52 PM    GLOBULIN 3.8 (H) 08/05/2022 12:52 PM    AGRATIO 1.1 08/05/2022 12:52 PM   ]    Lab Results   Component Value Date/Time    PROTHROMBTM 12.4 02/14/2012 03:57 PM    INR 0.91 02/14/2012 03:57 PM        Lab Results   Component Value Date/Time    WBC 9.0 08/05/2022 12:52 PM    RBC 3.93 (L) 08/05/2022 12:52 PM    HEMOGLOBIN 12.4 08/05/2022 12:52 PM    HEMATOCRIT 38.0 08/05/2022 12:52 PM     MCV 96.7 08/05/2022 12:52 PM    MCH 31.6 08/05/2022 12:52 PM    MCHC 32.6 (L) 08/05/2022 12:52 PM    MPV 11.5 08/05/2022 12:52 PM    NEUTSPOLYS 40.50 (L) 08/05/2022 12:52 PM    LYMPHOCYTES 48.40 (H) 08/05/2022 12:52 PM    MONOCYTES 7.20 08/05/2022 12:52 PM    EOSINOPHILS 3.00 08/05/2022 12:52 PM    BASOPHILS 0.60 08/05/2022 12:52 PM        Lab Results   Component Value Date/Time    HBA1C 5.7 (H) 08/05/2022 12:52 PM        Imaging:   I personally reviewed following images, these are my reads  ***        IMAGING radiology reads. I reviewed the following radiology reads ***      Results for orders placed during the hospital encounter of 05/13/16    MR-BRAIN-W/O    Impression  1.  Age-related cerebral atrophy.    2.  Old areas of infarction involving the left posterior medial parietal occipital lobe and the left inferior occipital lobe.    3.  Mild to moderate periventricular white matter changes consistent with chronic microvascular ischemic gliosis.    4.  No evidence of acute infarction in the brain parenchyma.                 Results for orders placed during the hospital encounter of 01/08/21    MR-LUMBAR SPINE-W/O    Addendum 2/15/2021  6:17 PM  ADDENDUM: 2/15/2021  At the request of medical assistant Janis Irving for ADILENE King it was asked to specifically comment on ligamentum flavum at the L2-5 levels.    L2-3: Mild-moderate ligamentum flavum hypertrophy superimposed on facet arthropathy.    L3-4: Mild ligamentum flavum hypertrophy superimposed on moderate facet hypertrophy.    L4-5: no significant ligamentum flavum hypertrophy. Hypertrophic facet arthropathy is dominant.    L5-S1: No significant ligamentum flavum hypertrophy. Moderate hypertrophic facet arthropathy.    Impression  1.  L4-5 grade 1 spondylolisthesis.  2.  L1-L2 localized Modic type I discogenic endplate marrow edema. This can be a pain generator.  3.  Degenerative changes most notable for L2-3 small-moderate-sized left paramedian disc  protrusion with cephalad extrusion. Left lateral recess stenosis. Mild left-sided central foraminal stenosis.  4.  Additional details as outlined for each level above in the body of report.        Results for orders placed during the hospital encounter of 01/08/21    MR-LUMBAR SPINE-W/O    Addendum 2/15/2021  6:17 PM  ADDENDUM: 2/15/2021  At the request of medical assistant Janis Irving for ADILENE King it was asked to specifically comment on ligamentum flavum at the L2-5 levels.    L2-3: Mild-moderate ligamentum flavum hypertrophy superimposed on facet arthropathy.    L3-4: Mild ligamentum flavum hypertrophy superimposed on moderate facet hypertrophy.    L4-5: no significant ligamentum flavum hypertrophy. Hypertrophic facet arthropathy is dominant.    L5-S1: No significant ligamentum flavum hypertrophy. Moderate hypertrophic facet arthropathy.    Impression  1.  L4-5 grade 1 spondylolisthesis.  2.  L1-L2 localized Modic type I discogenic endplate marrow edema. This can be a pain generator.  3.  Degenerative changes most notable for L2-3 small-moderate-sized left paramedian disc protrusion with cephalad extrusion. Left lateral recess stenosis. Mild left-sided central foraminal stenosis.  4.  Additional details as outlined for each level above in the body of report.                                          Results for orders placed during the hospital encounter of 04/16/04    DX-CHEST-2 VIEWS    Impression  IMPRESSION:    NORMAL CHEST.              READING DR:        PRISCILLA GARY MD  READING DATE:      Apr 16 2004 12:18PM  REPORT STATUS:     FINAL REPORT    TRANSCRIPTION CODE:         LXW  TRANSCRIPTION DATE:         Apr 16 2004  2:58PM    THIS DOCUMENT HAS BEEN ELECTRONICALLY SIGNED BY:  YAYA LOZA MD - Apr 16 2004  6:29PM               Results for orders placed during the hospital encounter of 11/02/12    DX-HIP-COMPLETE - UNILATERAL 2+    Impression  No bony abnormality.        Results for orders  "placed during the hospital encounter of 04/16/04    DX-KNEE COMPLETE 4+    Impression  IMPRESSION:      1.   NEGATIVE FOR FRACTURE.    2.   JOINT EFFUSION.    3.   MEDIAL AND PATELLOFEMORAL JOINT OSTEOARTHRITIS.              READING DR:        DEYVI BEACH MD  READING DATE:      Apr 16 2004  1:15PM  REPORT STATUS:     FINAL REPORT    TRANSCRIPTION CODE:         BXM  TRANSCRIPTION DATE:         Apr 16 2004  3:22PM    THIS DOCUMENT HAS BEEN ELECTRONICALLY SIGNED BY:  DEYVI BEACH MD - Apr 16 2004  5:03PM    Results for orders placed during the hospital encounter of 01/08/21    DX-LUMBAR SPINE-4+ VIEWS    Impression  Anterolisthesis of L3 on L4 and L4 on L5. There is some motion at L3-4 on flexion and extension views.    Advanced facet joint degenerative changes.    L3-4 disc space narrowing.                        Diagnosis ***  {No diagnosis found. (Refresh or delete this SmartLink)}        ASSESSMENT AND PLAN:  Mimi Arevalo Olvincharleen 76 y.o. female ***     There are no diagnoses linked to this encounter.      -***    PLAN  Physical therapy: I ordered physical therapy to focus on strengthening and stretching. ***    home exercise program: I provided the patient with a strengthening and stretching with a home exercise program ***    Diagnostic workup: As above***    Medications: {ckintervention:48345::\"I recommend avoiding narcotic medications in this patient. \"}  As above ***    Interventional program: I would consider the patient for an epidural steroid injection depending on the results of the above***     Referrals: ***     Outside records requested:  The patient signed outside records request form for her outside records including outside images. This includes the records from {ckrequestrecords:55569}      Follow-up: After the above diagnostic studies ***          Please note that this dictation was created using voice recognition software. I have made every reasonable attempt to correct obvious errors but there " may be errors of grammar and content that I may have overlooked prior to finalization of this note.      Christian Garcia MD  Physical Medicine and Rehabilitation  Interventional Spine and Sports Physiatry  [facility]           CC Ofelia Wayne A.P.* ***.

## 2022-09-22 ENCOUNTER — PATIENT OUTREACH (OUTPATIENT)
Dept: HEALTH INFORMATION MANAGEMENT | Facility: OTHER | Age: 77
End: 2022-09-22
Payer: MEDICARE

## 2022-09-26 NOTE — PROGRESS NOTES
9/26/22 @1:40PM    BRIANA Correa received a call from patient needing assistance for transportation to her dentist appointment that she has scheduled in October. CHW provided transportation number and instructed patient to give them a call and they will be able to schedule her appointment at the appropriate time. CHW asked patient if she was getting a visit from someone in [facility], and patient let CHW know that someone will be meeting with her today sometime in the afternoon. CHW let patient know that she will be available for calls anytime this week, and CHW will not actively follow unless needed in the future.     @1:55PM     CHW received a phone call from patient stating that she will be trying to make an appointment with [facility] transportation instead of [facility], and CHW suggested that for whatever reason the transportation cannot take her to the appointment, to reschedule the dentist appointment later in the month. Patient reported that she will use [facility] and will call back if there any issues with scheduling.

## 2022-10-05 RX ORDER — ALLOPURINOL 100 MG/1
100 TABLET ORAL DAILY
Qty: 100 TABLET | Refills: 3 | Status: SHIPPED | OUTPATIENT
Start: 2022-10-05 | End: 2023-03-28 | Stop reason: SDUPTHER

## 2022-11-08 ENCOUNTER — DOCUMENTATION (OUTPATIENT)
Dept: HEALTH INFORMATION MANAGEMENT | Facility: OTHER | Age: 77
End: 2022-11-08
Payer: MEDICARE

## 2023-01-18 ENCOUNTER — OFFICE VISIT (OUTPATIENT)
Dept: MEDICAL GROUP | Facility: MEDICAL CENTER | Age: 78
End: 2023-01-18
Payer: MEDICARE

## 2023-01-18 VITALS
WEIGHT: 248 LBS | SYSTOLIC BLOOD PRESSURE: 142 MMHG | TEMPERATURE: 98 F | DIASTOLIC BLOOD PRESSURE: 68 MMHG | OXYGEN SATURATION: 100 % | HEIGHT: 65 IN | HEART RATE: 65 BPM | BODY MASS INDEX: 41.32 KG/M2

## 2023-01-18 DIAGNOSIS — Z86.73 HISTORY OF CVA (CEREBROVASCULAR ACCIDENT): ICD-10-CM

## 2023-01-18 DIAGNOSIS — Z91.81 PERSONAL HISTORY OF FALL: ICD-10-CM

## 2023-01-18 DIAGNOSIS — I10 PRIMARY HYPERTENSION: ICD-10-CM

## 2023-01-18 DIAGNOSIS — F33.40 MDD (RECURRENT MAJOR DEPRESSIVE DISORDER) IN REMISSION (HCC): ICD-10-CM

## 2023-01-18 DIAGNOSIS — Z13.29 SCREENING FOR ENDOCRINE, METABOLIC AND IMMUNITY DISORDER: ICD-10-CM

## 2023-01-18 DIAGNOSIS — Z02.89 ENCOUNTER FOR COMPLETION OF FORM WITH PATIENT: ICD-10-CM

## 2023-01-18 DIAGNOSIS — Z13.228 SCREENING FOR ENDOCRINE, METABOLIC AND IMMUNITY DISORDER: ICD-10-CM

## 2023-01-18 DIAGNOSIS — Z13.0 SCREENING FOR ENDOCRINE, METABOLIC AND IMMUNITY DISORDER: ICD-10-CM

## 2023-01-18 DIAGNOSIS — K05.10 GINGIVITIS: ICD-10-CM

## 2023-01-18 DIAGNOSIS — N18.4 STAGE 4 CHRONIC KIDNEY DISEASE (HCC): ICD-10-CM

## 2023-01-18 DIAGNOSIS — E66.01 MORBID OBESITY WITH BMI OF 40.0-44.9, ADULT (HCC): ICD-10-CM

## 2023-01-18 PROBLEM — E87.29 HIGH ANION GAP METABOLIC ACIDOSIS: Status: RESOLVED | Noted: 2020-07-30 | Resolved: 2023-01-18

## 2023-01-18 PROCEDURE — 99214 OFFICE O/P EST MOD 30 MIN: CPT

## 2023-01-18 RX ORDER — ROSUVASTATIN CALCIUM 5 MG/1
5 TABLET, COATED ORAL EVERY EVENING
Qty: 30 TABLET | Refills: 0 | Status: SHIPPED | OUTPATIENT
Start: 2023-01-18 | End: 2023-04-20

## 2023-01-18 RX ORDER — CHLORHEXIDINE GLUCONATE ORAL RINSE 1.2 MG/ML
15 SOLUTION DENTAL 2 TIMES DAILY
Qty: 473 ML | Refills: 0 | Status: SHIPPED | OUTPATIENT
Start: 2023-01-18 | End: 2023-08-22

## 2023-01-18 ASSESSMENT — ENCOUNTER SYMPTOMS
CHILLS: 0
SHORTNESS OF BREATH: 0
COUGH: 0
PALPITATIONS: 0
ORTHOPNEA: 0
FEVER: 0

## 2023-01-18 ASSESSMENT — PATIENT HEALTH QUESTIONNAIRE - PHQ9: CLINICAL INTERPRETATION OF PHQ2 SCORE: 0

## 2023-01-18 ASSESSMENT — FIBROSIS 4 INDEX: FIB4 SCORE: 1.87

## 2023-01-18 NOTE — PROGRESS NOTES
Subjective:     CC: Establish Care (Prior pcp ; raymond finney ) and Paperwork (Critical access hospital paperwork )      HPI:   Mimi is a 77 y.o. female who presents today for:     History of a Stroke  She reports that her last stroke was about 4 years ago. She does not remember the exact date. She denies any recurrent strokes since then. Neuro exam intact today.    Stage 4 CKD  Last set of labs in 8/2022 showed a GFR of 25. Discussed with patient, will repeat blood work.     Hypertension  Blood pressure elevated today, /68. Patient believes this is due to the stress of trying to get to her appointment on time.     Gingivitis  She has been using chlorhexidine mouthwash to help prevent worsening gingivitis.    Falls  She reports recent falls, she does use a walker to ambulate    Depression  She states that her symptoms are in remission. PHQ-2 today was 0.     Allergies: Penicillins, Risperdal [benzoic acid-risperidone], Mendoza beans, Peach [prunus persica], Atenolol, and Chlorpheniramine     Medications:   Current Outpatient Medications:     chlorhexidine (PERIDEX) 0.12 % Solution, Take 15 mL by mouth 2 times a day., Disp: 473 mL, Rfl: 0    rosuvastatin (CRESTOR) 5 MG Tab, Take 1 Tablet by mouth every evening., Disp: 30 Tablet, Rfl: 0    allopurinol (ZYLOPRIM) 100 MG Tab, Take 1 Tablet by mouth every day., Disp: 100 Tablet, Rfl: 3    gabapentin (NEURONTIN) 100 MG Cap, Take 2 Capsules by mouth 2 times a day., Disp: 200 Capsule, Rfl: 3    aspirin EC (ECOTRIN) 81 MG Tablet Delayed Response, Take 81 mg by mouth every day., Disp: , Rfl:     carvedilol (COREG) 6.25 MG Tab, Take 1 Tablet by mouth 2 times a day with meals., Disp: 200 Tablet, Rfl: 3    amLODIPine (NORVASC) 10 MG Tab, Take 1 Tablet by mouth every day., Disp: 100 Tablet, Rfl: 3    cetirizine (ZYRTEC) 10 MG Tab, Take 1 Tablet by mouth every day., Disp: 100 Tablet, Rfl: 3    acetaminophen (TYLENOL) 650 MG CR tablet, ACETAMINOPHEN  MG CR-TABS, Disp: , Rfl:  "      ROS:  Review of Systems   Constitutional:  Negative for chills and fever.   Respiratory:  Negative for cough and shortness of breath.    Cardiovascular:  Negative for chest pain, palpitations, orthopnea and leg swelling.     Objective:     Exam:  BP (!) 142/68 (BP Location: Left arm, Patient Position: Sitting, BP Cuff Size: Adult)   Pulse 65   Temp 36.7 °C (98 °F) (Temporal)   Ht 1.651 m (5' 5\")   Wt 112 kg (248 lb)   SpO2 100%   BMI 41.27 kg/m²  Body mass index is 41.27 kg/m².    Physical Exam  Constitutional:       Appearance: Normal appearance. She is obese.   Eyes:      Pupils: Pupils are equal, round, and reactive to light.   Cardiovascular:      Rate and Rhythm: Normal rate and regular rhythm.      Pulses: Normal pulses.      Heart sounds: Normal heart sounds.   Pulmonary:      Effort: Pulmonary effort is normal.      Breath sounds: Normal breath sounds.   Abdominal:      General: Bowel sounds are normal.      Palpations: Abdomen is soft.   Neurological:      Mental Status: She is alert and oriented to person, place, and time.   Psychiatric:         Mood and Affect: Mood normal.         Behavior: Behavior normal.         Assessment & Plan:     Mimi william 77 y.o. female with the following -     1. History of CVA (cerebrovascular accident)  Chronic, stable  -Patient's neuro exam completely intact today.  MME completed with patient as patient requesting DMV paperwork be completed so she can get her license back.  - Lipid Profile; Future  - rosuvastatin (CRESTOR) 5 MG Tab; Take 1 Tablet by mouth every evening.  Dispense: 30 Tablet; Refill: 0    2. Encounter for completion of form with patient  -DMV paperwork completed with patient    3. Stage 4 chronic kidney disease (HCC)  Chronic, stable  -Blood work completed 5 months ago showed a GFR of 25.  Patient states that she does not have a nephrologist.  Referral placed to nephrology and repeat blood work ordered.  - Referral to Nephrology  - Comp Metabolic " Panel; Future    4. Primary hypertension  Chronic, stable  -Blood pressure elevated today, /68.  Patient has been stable on these medications for some time.  She would like to hold off on adjusting her medications further, and have her blood pressure rechecked at her follow-up visit.  - carvedilol (COREG) 6.25 MG Tab, Take 1 Tablet by mouth 2 times a day with meals., Disp: 200 Tablet, Rfl: 3  - amLODIPine (NORVASC) 10 MG Tab, Take 1 Tablet by mouth every day., Disp: 100 Tablet, Rfl: 3    5. Gingivitis  Chronic, stable  -She reports needing several teeth to be pulled.  Referral placed to dentist.  - chlorhexidine (PERIDEX) 0.12 % Solution; Take 15 mL by mouth 2 times a day.  Dispense: 473 mL; Refill: 0  - Referral to Dentistry    6. Personal history of fall  Chronic, stable  -Instructed patient to always use her walker whenever she is walking  - Patient identified as fall risk.  Appropriate orders and counseling given.    7. MDD (recurrent major depressive disorder) in remission (HCC)  Chronic, stable  -PHQ-2 score of 0 today.  Patient states that her depression is well controlled, and in remission.    8. Morbid obesity with BMI of 40.0-44.9, adult (HCC)  Chronic, stable  - TSH WITH REFLEX TO FT4; Future  - Comp Metabolic Panel; Future  - Lipid Profile; Future  - CBC WITHOUT DIFFERENTIAL; Future  - Patient identified as having weight management issue.  Appropriate orders and counseling given.    9. Screening for endocrine, metabolic and immunity disorder  - HEMOGLOBIN A1C; Future      Anticipatory guidance included the following: Patient counseled about skin care, diet, supplements, smoking, drugs/alcohol use, safe sex and exercise.     Return in about 3 months (around 4/18/2023).    Please note that this dictation was created using voice recognition software. I have made every reasonable attempt to correct obvious errors, but I expect that there are errors of grammar and possibly content that I did not  discover before finalizing the note.

## 2023-01-20 ENCOUNTER — HOSPITAL ENCOUNTER (OUTPATIENT)
Dept: LAB | Facility: MEDICAL CENTER | Age: 78
End: 2023-01-20
Payer: MEDICARE

## 2023-01-20 DIAGNOSIS — Z86.73 HISTORY OF CVA (CEREBROVASCULAR ACCIDENT): ICD-10-CM

## 2023-01-20 DIAGNOSIS — Z13.29 SCREENING FOR ENDOCRINE, METABOLIC AND IMMUNITY DISORDER: ICD-10-CM

## 2023-01-20 DIAGNOSIS — Z13.228 SCREENING FOR ENDOCRINE, METABOLIC AND IMMUNITY DISORDER: ICD-10-CM

## 2023-01-20 DIAGNOSIS — E66.01 MORBID OBESITY WITH BMI OF 40.0-44.9, ADULT (HCC): ICD-10-CM

## 2023-01-20 DIAGNOSIS — Z13.0 SCREENING FOR ENDOCRINE, METABOLIC AND IMMUNITY DISORDER: ICD-10-CM

## 2023-01-20 LAB
ALBUMIN SERPL BCP-MCNC: 3.8 G/DL (ref 3.2–4.9)
ALBUMIN/GLOB SERPL: 1.1 G/DL
ALP SERPL-CCNC: 110 U/L (ref 30–99)
ALT SERPL-CCNC: 6 U/L (ref 2–50)
ANION GAP SERPL CALC-SCNC: 15 MMOL/L (ref 7–16)
AST SERPL-CCNC: 14 U/L (ref 12–45)
BILIRUB SERPL-MCNC: 0.6 MG/DL (ref 0.1–1.5)
BUN SERPL-MCNC: 27 MG/DL (ref 8–22)
CALCIUM ALBUM COR SERPL-MCNC: 9.4 MG/DL (ref 8.5–10.5)
CALCIUM SERPL-MCNC: 9.2 MG/DL (ref 8.5–10.5)
CHLORIDE SERPL-SCNC: 106 MMOL/L (ref 96–112)
CHOLEST SERPL-MCNC: 215 MG/DL (ref 100–199)
CO2 SERPL-SCNC: 18 MMOL/L (ref 20–33)
CREAT SERPL-MCNC: 1.5 MG/DL (ref 0.5–1.4)
ERYTHROCYTE [DISTWIDTH] IN BLOOD BY AUTOMATED COUNT: 46.3 FL (ref 35.9–50)
EST. AVERAGE GLUCOSE BLD GHB EST-MCNC: 114 MG/DL
FASTING STATUS PATIENT QL REPORTED: NORMAL
GFR SERPLBLD CREATININE-BSD FMLA CKD-EPI: 36 ML/MIN/1.73 M 2
GLOBULIN SER CALC-MCNC: 3.6 G/DL (ref 1.9–3.5)
GLUCOSE SERPL-MCNC: 80 MG/DL (ref 65–99)
HBA1C MFR BLD: 5.6 % (ref 4–5.6)
HCT VFR BLD AUTO: 40.8 % (ref 37–47)
HDLC SERPL-MCNC: 45 MG/DL
HGB BLD-MCNC: 13.5 G/DL (ref 12–16)
LDLC SERPL CALC-MCNC: 151 MG/DL
MCH RBC QN AUTO: 32.4 PG (ref 27–33)
MCHC RBC AUTO-ENTMCNC: 33.1 G/DL (ref 33.6–35)
MCV RBC AUTO: 97.8 FL (ref 81.4–97.8)
PLATELET # BLD AUTO: 170 K/UL (ref 164–446)
PMV BLD AUTO: 12.9 FL (ref 9–12.9)
POTASSIUM SERPL-SCNC: 4.3 MMOL/L (ref 3.6–5.5)
PROT SERPL-MCNC: 7.4 G/DL (ref 6–8.2)
RBC # BLD AUTO: 4.17 M/UL (ref 4.2–5.4)
SODIUM SERPL-SCNC: 139 MMOL/L (ref 135–145)
TRIGL SERPL-MCNC: 93 MG/DL (ref 0–149)
TSH SERPL DL<=0.005 MIU/L-ACNC: 0.98 UIU/ML (ref 0.38–5.33)
WBC # BLD AUTO: 7.3 K/UL (ref 4.8–10.8)

## 2023-01-20 PROCEDURE — 80053 COMPREHEN METABOLIC PANEL: CPT

## 2023-01-20 PROCEDURE — 83036 HEMOGLOBIN GLYCOSYLATED A1C: CPT

## 2023-01-20 PROCEDURE — 84443 ASSAY THYROID STIM HORMONE: CPT

## 2023-01-20 PROCEDURE — 36415 COLL VENOUS BLD VENIPUNCTURE: CPT

## 2023-01-20 PROCEDURE — 85027 COMPLETE CBC AUTOMATED: CPT

## 2023-01-20 PROCEDURE — 80061 LIPID PANEL: CPT

## 2023-03-28 DIAGNOSIS — I10 PRIMARY HYPERTENSION: ICD-10-CM

## 2023-03-28 RX ORDER — AMLODIPINE BESYLATE 10 MG/1
10 TABLET ORAL DAILY
Qty: 100 TABLET | Refills: 3 | Status: ON HOLD | OUTPATIENT
Start: 2023-03-28 | End: 2023-09-20

## 2023-03-28 RX ORDER — ALLOPURINOL 100 MG/1
100 TABLET ORAL DAILY
Qty: 100 TABLET | Refills: 3 | Status: SHIPPED | OUTPATIENT
Start: 2023-03-28 | End: 2024-01-19 | Stop reason: SDUPTHER

## 2023-03-28 RX ORDER — CARVEDILOL 6.25 MG/1
6.25 TABLET ORAL 2 TIMES DAILY WITH MEALS
Qty: 200 TABLET | Refills: 3 | Status: ON HOLD | OUTPATIENT
Start: 2023-03-28 | End: 2023-09-20

## 2023-04-20 ENCOUNTER — OFFICE VISIT (OUTPATIENT)
Dept: MEDICAL GROUP | Facility: MEDICAL CENTER | Age: 78
End: 2023-04-20
Payer: MEDICARE

## 2023-04-20 VITALS
HEIGHT: 65 IN | SYSTOLIC BLOOD PRESSURE: 136 MMHG | BODY MASS INDEX: 39.15 KG/M2 | TEMPERATURE: 96.8 F | DIASTOLIC BLOOD PRESSURE: 78 MMHG | OXYGEN SATURATION: 99 % | WEIGHT: 235 LBS | HEART RATE: 69 BPM

## 2023-04-20 DIAGNOSIS — M25.552 PAIN OF LEFT HIP: ICD-10-CM

## 2023-04-20 DIAGNOSIS — G31.9 DEGENERATIVE DISEASE OF NERVOUS SYSTEM, UNSPECIFIED (HCC): ICD-10-CM

## 2023-04-20 DIAGNOSIS — E78.5 DYSLIPIDEMIA: ICD-10-CM

## 2023-04-20 DIAGNOSIS — K02.9 DENTAL CARIES: ICD-10-CM

## 2023-04-20 DIAGNOSIS — M25.562 CHRONIC PAIN OF LEFT KNEE: ICD-10-CM

## 2023-04-20 DIAGNOSIS — G89.29 CHRONIC PAIN OF LEFT KNEE: ICD-10-CM

## 2023-04-20 DIAGNOSIS — Z86.73 HISTORY OF CVA (CEREBROVASCULAR ACCIDENT): ICD-10-CM

## 2023-04-20 DIAGNOSIS — I10 PRIMARY HYPERTENSION: ICD-10-CM

## 2023-04-20 PROCEDURE — 99214 OFFICE O/P EST MOD 30 MIN: CPT

## 2023-04-20 RX ORDER — ATORVASTATIN CALCIUM 40 MG/1
40 TABLET, FILM COATED ORAL NIGHTLY
Qty: 90 TABLET | Refills: 3 | Status: SHIPPED | OUTPATIENT
Start: 2023-04-20 | End: 2023-08-22

## 2023-04-20 RX ORDER — AMOXICILLIN 500 MG/1
CAPSULE ORAL
COMMUNITY
Start: 2023-04-17 | End: 2023-08-22

## 2023-04-20 RX ORDER — CLINDAMYCIN HYDROCHLORIDE 300 MG/1
CAPSULE ORAL
COMMUNITY
Start: 2023-04-17 | End: 2023-08-22

## 2023-04-20 ASSESSMENT — ENCOUNTER SYMPTOMS
FEVER: 0
PALPITATIONS: 0
SHORTNESS OF BREATH: 0
CHILLS: 0
COUGH: 0
ORTHOPNEA: 0

## 2023-04-20 ASSESSMENT — FIBROSIS 4 INDEX: FIB4 SCORE: 2.56

## 2023-04-20 NOTE — PROGRESS NOTES
Subjective:     CC: Other (cerebrovascular accident 3mth fv)      HPI:   Mimi is a 77 y.o. female who presents today for: dyslipidemia, hypertension, and hx CVA.     Dyslipidemia: she reports that she cut back on eating eggs and beef. She states she has not been taking her Crestor because it made her sick to her stomach.    She currently has a tooth infection and is on antibiotics to treat this. She is seeing a dentist, but has to complete her antibiotics before they will fix her tooth.     Hip and knee pain: She reports left hip and knee pain, this is a chronic condition. She is requesting to go to physical therapy. She uses tylenol to help with the pain.     Allergies: Penicillins, Risperdal [benzoic acid-risperidone], Mendoza beans, Peach [prunus persica], Atenolol, and Chlorpheniramine     Medications:   Current Outpatient Medications:     clindamycin (CLEOCIN) 300 MG Cap, , Disp: , Rfl:     amoxicillin (AMOXIL) 500 MG Cap, , Disp: , Rfl:     atorvastatin (LIPITOR) 40 MG Tab, Take 1 Tablet by mouth every evening., Disp: 90 Tablet, Rfl: 3    amLODIPine (NORVASC) 10 MG Tab, Take 1 Tablet by mouth every day., Disp: 100 Tablet, Rfl: 3    carvedilol (COREG) 6.25 MG Tab, Take 1 Tablet by mouth 2 times a day with meals., Disp: 200 Tablet, Rfl: 3    allopurinol (ZYLOPRIM) 100 MG Tab, Take 1 Tablet by mouth every day., Disp: 100 Tablet, Rfl: 3    chlorhexidine (PERIDEX) 0.12 % Solution, Take 15 mL by mouth 2 times a day., Disp: 473 mL, Rfl: 0    gabapentin (NEURONTIN) 100 MG Cap, Take 2 Capsules by mouth 2 times a day., Disp: 200 Capsule, Rfl: 3    aspirin EC (ECOTRIN) 81 MG Tablet Delayed Response, Take 81 mg by mouth every day., Disp: , Rfl:     cetirizine (ZYRTEC) 10 MG Tab, Take 1 Tablet by mouth every day., Disp: 100 Tablet, Rfl: 3    acetaminophen (TYLENOL) 650 MG CR tablet, ACETAMINOPHEN  MG CR-TABS, Disp: , Rfl:       ROS:  Review of Systems   Constitutional:  Negative for chills and fever.   Respiratory:   "Negative for cough and shortness of breath.    Cardiovascular:  Negative for chest pain, palpitations, orthopnea and leg swelling.     Objective:     Exam:  /78 (BP Location: Left arm, Patient Position: Sitting, BP Cuff Size: Adult)   Pulse 69   Temp 36 °C (96.8 °F) (Temporal)   Ht 1.651 m (5' 5\")   Wt 107 kg (235 lb)   SpO2 99%   BMI 39.11 kg/m²  Body mass index is 39.11 kg/m².    Physical Exam  Constitutional:       Appearance: Normal appearance. She is obese.   Eyes:      Pupils: Pupils are equal, round, and reactive to light.   Cardiovascular:      Rate and Rhythm: Normal rate and regular rhythm.      Pulses: Normal pulses.      Heart sounds: Normal heart sounds.   Pulmonary:      Effort: Pulmonary effort is normal.      Breath sounds: Normal breath sounds.   Abdominal:      General: Bowel sounds are normal.      Palpations: Abdomen is soft.   Neurological:      Mental Status: She is alert and oriented to person, place, and time.   Psychiatric:         Mood and Affect: Mood normal.         Behavior: Behavior normal.         Assessment & Plan:     Mimi william 77 y.o. female with the following -     1. Dyslipidemia  Chronic, unstable  Will switch her to atorvastatin to see if it makes her less nauseous.    - atorvastatin (LIPITOR) 40 MG Tab; Take 1 Tablet by mouth every evening.  Dispense: 90 Tablet; Refill: 3    2. History of CVA (cerebrovascular accident)   Chronic, stable  Will switch her to atorvastatin to see if it makes her less nauseous.    - atorvastatin (LIPITOR) 40 MG Tab; Take 1 Tablet by mouth every evening.  Dispense: 90 Tablet; Refill: 3    3. Dental caries  Chronic, unstable  Complete antibiotics and follow up with dentist.  - clindamycin (CLEOCIN) 300 MG Cap  - amoxicillin (AMOXIL) 500 MG Cap    5. Pain of left hip  6. Chronic pain of left knee  Chronic, unstable  We discussed increasing activity around the house to help with pain  - Referral to Physical Therapy  - acetaminophen (TYLENOL) " 650 MG CR tablet, ACETAMINOPHEN  MG CR-TABS, Disp: , Rfl:     Anticipatory guidance included the following: Patient counseled about skin care, diet, supplements, smoking, drugs/alcohol use, safe sex and exercise.     Return in about 3 months (around 7/20/2023).    Please note that this dictation was created using voice recognition software. I have made every reasonable attempt to correct obvious errors, but I expect that there are errors of grammar and possibly content that I did not discover before finalizing the note.

## 2023-05-12 ENCOUNTER — TELEPHONE (OUTPATIENT)
Dept: HEALTH INFORMATION MANAGEMENT | Facility: OTHER | Age: 78
End: 2023-05-12
Payer: MEDICARE

## 2023-06-27 ENCOUNTER — APPOINTMENT (OUTPATIENT)
Dept: PHYSICAL THERAPY | Facility: REHABILITATION | Age: 78
End: 2023-06-27
Payer: MEDICARE

## 2023-06-27 ENCOUNTER — PHYSICAL THERAPY (OUTPATIENT)
Dept: PHYSICAL THERAPY | Facility: REHABILITATION | Age: 78
End: 2023-06-27
Payer: MEDICARE

## 2023-06-27 DIAGNOSIS — M25.552 PAIN IN LEFT HIP: ICD-10-CM

## 2023-06-27 DIAGNOSIS — M25.562 CHRONIC PAIN OF LEFT KNEE: ICD-10-CM

## 2023-06-27 DIAGNOSIS — G89.29 CHRONIC PAIN OF LEFT KNEE: ICD-10-CM

## 2023-06-27 DIAGNOSIS — M54.50 LOW BACK PAIN, UNSPECIFIED BACK PAIN LATERALITY, UNSPECIFIED CHRONICITY, UNSPECIFIED WHETHER SCIATICA PRESENT: ICD-10-CM

## 2023-06-27 PROCEDURE — 97162 PT EVAL MOD COMPLEX 30 MIN: CPT

## 2023-06-27 ASSESSMENT — ACTIVITIES OF DAILY LIVING (ADL): POOR_BALANCE: 1

## 2023-06-27 ASSESSMENT — ENCOUNTER SYMPTOMS
QUALITY: NEEDLE-LIKE
ALLEVIATING FACTORS: REST
PAIN SCALE: 5
PAIN TIMING: CONSTANT
PAIN SCALE AT LOWEST: 2
PAIN SCALE AT HIGHEST: 8
PAIN TIMING: ALL DAY
PAIN LOCATION: L HIP
EXACERBATED BY: STANDING
EXACERBATED BY: WALKING

## 2023-06-27 NOTE — OP THERAPY EVALUATION
"  Outpatient Physical Therapy  INITIAL EVALUATION    [facility] [address]  [address]  [place] [address]  Phone:  [phone]  Fax:  [phone]    Date of Evaluation: 06/27/2023    Patient: Mimi Appiah  YOB: 1945  MRN: [id number]     Referring Provider: ROCIO Delacruz  [facility] [address]  [place] [address]   Referring Diagnosis Pain in left hip [M25.552];Pain in left knee [M25.562];Other chronic pain [G89.29]     Time Calculation  Start time: 1345  Stop time: 1430 Time Calculation (min): 45 minutes         Chief Complaint: Hip Problem, Knee Problem, and Back Problem    Visit Diagnoses     ICD-10-CM   1. Pain in left hip  M25.552   2. Chronic pain of left knee  M25.562    G89.29   3. Low back pain, unspecified back pain laterality, unspecified chronicity, unspecified whether sciatica present  M54.50       Date of onset of impairment: 4/20/2023    Subjective:   History of Present Illness:     Date of onset:  4/20/2023    Mechanism of injury:  Patient is a pleasant 77 year old female who presents to PT evaluation with complaint of B hip pain, L knee pain, and reporting generalized joint pains. Patient reports that since she was a young child, she had difficulty with pain and states that she is pretty sure something \"runs\" in the family, however, is unable to state accuracy. Patient reports she had x3 CVAs and is currently utilizing a 4WW. She reports she lives alone and has to take care of herself. Patient is adamant, without prompting, that she does not want to move into a \"home\" but upon discussion, appears potentially open to some assistance.     Patient reports that the \"worse\" fall she had was on the bus, she reports that was about a year ago. She states she has also fallen in her living room, \"just walking\".    Patient reports she has fallen 1x in the last 6 months when she fell out of the bed. She reports she has a healing sore " "(appears to be healing on observation) on her L knee.     Denies numbness and tingling. Denies bowel and bladder changes.     Patient reports she consistently utilizes an AD, 4WW present for evaluation. She reports she also has an electric wheelchair but it does not work well as it \"shakes.\"     Patient reports fear of falling and anxiety over community travel.     Patient reports she occasionally takes Tylenol but she is fearful of taking pain medication overall due to family history. Reports several family members with pain issues.     Patient reports 2-3 years ago, \"they\" wanted to do back surgery but patient refused.  Prior level of function:  Independent but limited  Headaches:  no headaches  Ear problems: none  Sleep disturbance:  Not disrupted  Pain:     Current pain ratin    At best pain ratin    At worst pain ratin    Location:  L hip    Quality:  Needle-like (electric pain)    Pain timing:  All day and constant    Relieving factors:  Rest (distraction)    Aggravating factors:  Walking and standing (transition from sit<>stand)    Progression:  Improving  Social Support:     Lives in:  Apartment (no JULIANNA)    Lives with:  Alone  Hand dominance:  Right  Diagnostic Tests:     Diagnostic Tests Comments:  2/15/21, MRI Lumbar Spine:  \"IMPRESSION:  1.  L4-5 grade 1 spondylolisthesis.  2.  L1-L2 localized Modic type I discogenic endplate marrow edema. This can be a pain generator.  3.  Degenerative changes most notable for L2-3 small-moderate-sized left paramedian disc protrusion with cephalad extrusion. Left lateral recess stenosis. Mild left-sided central foraminal stenosis.  4.  Additional details as outlined for each level above in the body of report.\"    2021, Xray Lumbar Spine:  \"IMPRESSION:  Anterolisthesis of L3 on L4 and L4 on L5. There is some motion at L3-4 on flexion and extension views.  Advanced facet joint degenerative changes.  L3-4 disc space narrowing.\"  Treatments:     Previous " treatment:  Medication    Current treatment:  Physical therapy  Activities of Daily Living:     Patient reported ADL status: Reports it is hard to bath and dress; Does not have any sort of shower chair, etc  Patient Goals:     Patient goals for therapy:  Decreased pain, improved balance and increased strength      Past Medical History:   Diagnosis Date    Abnormal Pap smear     Abnormal Pap smear 04/11/2012    1970 precancerous lesions treated with cyro    Abnormality of hormone 08/14/2020    Allergic rhinitis     BMI 40.0-44.9, adult (HCC) 07/01/2022    Congestion of nasal sinus 1/7/2020    Encounter for screening for malignant neoplasm of breast 04/11/2012    Preventative Health (Women): Tetanus - ? Pneumovax - 2005  Flu shot - 10/12 Stool - 2005  Pap Smear - 2009, precancer of cervix 1970 - treated with cryo.  Decline further Mammogram - none Breast Exam - none Dexa Scan - none Colonscopy - 2005, was done in [facility].  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    Sinusitis 04/23/2020    Standard chest x-ray abnormal 10/04/2016    Stroke ([facility])     Unspecified fall, initial encounter 09/11/2018     Past Surgical History:   Procedure Laterality Date    TUBAL COAGULATION LAPAROSCOPIC BILATERAL       Social History     Tobacco Use    Smoking status: Some Days     Packs/day: 0.50     Types: Cigarettes    Smokeless tobacco: Never   Vaping Use    Vaping Use: Never used   Substance Use Topics    Alcohol use: No     Family and Occupational History     Socioeconomic History    Marital status:       Spouse name: Not on file    Number of children: Not on file    Years of education: Not on file    Highest education level: Not on file   Occupational History    Not on file       Objective     Observations     Additional Observation Details  Overall appears in alignment, difficult to fully assess due to tenderness on palpation to R side     Postural Observations  Seated posture: poor  Standing posture: poor    Additional Postural Observation Details  Frequently re-adjusts sitting position in chair; offloading B hip/pelvis    Hip Screen   Hip strength within functional limits with the following exceptions: L hip flexion: 3/5, pain  R hip flexion: 3+/5, pain  B hip abduction: 5/5  B hip adduction: 4/5    L knee flexion: 3+/5  R knee flexion: 4/5    Neurological Testing     Myotome testing   Lumbar (left)   L2 (hip flexors): 3  L3 (knee extensors): 4-  L4 (ankle dorsiflexors): 5  L5 (great toe extension): 5  S1 (ankle plantar flexors): 5    Lumbar (right)   L2 (hip flexors): 3+  L3 (knee extensors): 4-  L4 (ankle dorsiflexors): 5  L5 (great toe extension): 5  S1 (ankle plantar flexors): 5    Dermatome testing   Lumbar (left)   L1: decreased  L2: decreased  L3: decreased  L4: decreased  L5: decreased  S1: decreased  S2: decreased    Lumbar (right)   L1: intact  L2: intact  L3: intact  L4: intact  L5: intact  S1: intact  S2: intact    Additional Neurological Details  Reports decreased sensation to L LE as compared to R     Palpation   Left   No palpable tenderness to the gluteus tommy, gluteus medius, iliotibial, piriformis, proximal biceps femoris, proximal semimembranosus, proximal semitendinosus, rectus femoris and TFL.   Tenderness of the lumbar paraspinals.     Right   No palpable tenderness to the gluteus tommy, gluteus medius, piriformis, proximal biceps femoris, proximal semimembranosus, proximal semitendinosus, rectus femoris and TFL.   Tenderness of the iliotibial and lumbar paraspinals.     Tenderness      Left Hip   Tenderness in the PSIS.  No tenderness in the ASIS, greater trochanter, iliac crest, ischial tuberosity and IT band.     Right Hip   Tenderness in the PSIS, iliac crest and IT band. No tenderness in the ASIS and ischial tuberosity.     Active Range of Motion     Lumbar   Flexion: decreased  Extension: decreased  Left rotation: decreased  Right rotation: decreased    Additional Active Range of Motion Details  R rotation, mild pain     Strength:      Left Hip   Planes of Motion   Flexion: 3  Abduction: 5  Adduction: 4    Right Hip   Planes of Motion   Flexion: 3+  Abduction: 5  Adduction: 4    Left Knee   Flexion: 3+  Extension: 4-    Right Knee   Flexion: 4  Extension: 4-    Left Ankle/Foot   Dorsiflexion: 5  Plantar flexion: 5    Right Ankle/Foot   Dorsiflexion: 5  Plantar flexion: 5    Tests     Left Pelvic Girdle/Sacrum   Negative: thigh thrust.     Right Pelvic Girdle/Sacrum   Negative: thigh thrust.     Left Hip   SLR: Positive.     Right Hip   SLR: Positive.     Additional Tests Details  Minimal testing due to patient mobility         Therapeutic Exercises (CPT 34978):     1. PT evaluation completed; goals discussed. Patient in agreement with plan.    20. PN due 7/27; Recert due 9/5      Time-based treatments/modalities:           Assessment, Response and Plan:   Impairments: abnormal gait, activity intolerance, impaired functional mobility, impaired balance, impaired physical strength, limited mobility, pain with function and safety issue    Other Impairments:  Patient reports fall history; fall risk  Assessment details:  Patient is a pleasant 77 year old female who presents to PT evaluation with complaint of back pian which affects B hip and L knee. Patient with reported generalized joint pains. Patient demonstrates decreased B LE strength and utilizes 4WW for gait. Patient reports limited mobility and history of falls. She also reports L LE sensation deficit. At this time, patient will benefit  from skilled PT to promote core stabilization and increased B LE strengthening to promote improved functional mobility skills and decreased pain.   Barriers to therapy:  Comorbidities and transportation  Prognosis: fair    Goals:   Short Term Goals:   1. Patient will demonstrate independent HEP to promote increased strength for improved functional activity tolerance and mobility skills.    2. Patient will demonstrate improved B LE strength to grossly 4/5 to promote improved functional mobility skills and gait mechanics.     3. Patient will complete further balance assessment to determine safety/risk of falls.     Short term goal time span:  2-4 weeks      Long Term Goals:    1. Patient will report improved WOMAC to indicate increased quality of life.    2. Patient will demonstrate improved B LE strength to grossly 5/5 to promote improved functional mobility skills.    3. Patient will demonstrate improved balance assessment, as appropriate based on initial assessment.    4. Patient will report improved pain to allow for increased mobility and ability to participate in daily tasks.       Long term goal time span:  2-4 months    Plan:   Therapy options:  Physical therapy treatment to continue  Planned therapy interventions:  Neuromuscular Re-education (CPT 39668), Manual Therapy (CPT 69756), Therapeutic Activities (CPT 50234), Therapeutic Exercise (CPT 12321), E Stim Unattended (CPT 89934) and Hot or Cold Pack Therapy (CPT 68602)  Frequency:  1x week  Duration in weeks:  10  Discussed with:  Patient  Plan details:  Core strengthening/ B LE strengthening/ balance       Functional Assessment Used  WOMAC Grand Total: 83.33     Referring provider co-signature:  I have reviewed this plan of care and my co-signature certifies the need for services.    Certification Period: 06/27/2023 to  09/05/23    Physician Signature: ________________________________ Date: ______________

## 2023-06-29 ENCOUNTER — PHYSICAL THERAPY (OUTPATIENT)
Dept: PHYSICAL THERAPY | Facility: REHABILITATION | Age: 78
End: 2023-06-29
Payer: MEDICARE

## 2023-06-29 ENCOUNTER — APPOINTMENT (OUTPATIENT)
Dept: PHYSICAL THERAPY | Facility: REHABILITATION | Age: 78
End: 2023-06-29
Payer: MEDICARE

## 2023-06-29 DIAGNOSIS — G89.29 CHRONIC PAIN OF LEFT KNEE: ICD-10-CM

## 2023-06-29 DIAGNOSIS — M25.562 CHRONIC PAIN OF LEFT KNEE: ICD-10-CM

## 2023-06-29 DIAGNOSIS — M25.552 PAIN IN LEFT HIP: ICD-10-CM

## 2023-06-29 DIAGNOSIS — M54.50 LOW BACK PAIN, UNSPECIFIED BACK PAIN LATERALITY, UNSPECIFIED CHRONICITY, UNSPECIFIED WHETHER SCIATICA PRESENT: ICD-10-CM

## 2023-06-29 PROCEDURE — 97110 THERAPEUTIC EXERCISES: CPT

## 2023-06-29 NOTE — OP THERAPY DAILY TREATMENT
Outpatient Physical Therapy  DAILY TREATMENT     [address]  [address]  [place] [address]  Phone:  [phone]  Fax:  [phone]    Date: 2023    Patient: Mimi Appiah  YOB: 1945  MRN: [id number]     Time Calculation    Start time: 1346  Stop time: 1426 Time Calculation (min): 40 minutes         Chief Complaint: Hip Problem, Knee Problem, and Back Problem    Visit #: 2    SUBJECTIVE:  Patient presents to PT treatment and reports she is not having much pain today. She reports she is feeling good today.     OBJECTIVE:  Current objective measures:     TUWW utilized: increased time with sit<>stand transitions, specifically stand>sit   Average: 25.7 seconds  Trial 1: 29.8 seconds  Trial 2: 25.24 seconds  Trial 3: 22.16 seconds            Therapeutic Exercises (CPT 83092):     1. TUG, 25.7 seconds, with 4WW,     2. sit<>stands, x3, some increased R knee discomfort noted    3. seated marches, x10, B, demonstrates fatigue near end of trials    4. seated hip abduction, 2x10, trial orange theraband next session    5. seated hip adduction, 2x10; 3 second holds    6. seated TrA bracing, x10, 3 second holds    20. PN due ; Recert due       Therapeutic Exercise Summary: Discussed safety during HEP with patient. Patient verbalizes understanding.   Access Code: [email or web address]  URL: [email or web address]  Date: 2023  Prepared by: Margaret Minaya    Exercises  - Seated Transversus Abdominis Bracing  - 1 x daily - 1 sets - 10 reps - 3 seconds hold  - Seated Hip Abduction  - 1 x daily - 1 sets - 10 reps  - Seated Hip Adduction Isometrics with Ball  - 1 x daily - 1 sets - 10 reps - 3 seconds hold  - Sit to Stand with Counter Support  - 1 x daily - 1 sets - 3 reps      Time-based treatments/modalities:    Physical Therapy Timed Treatment Charges  Therapeutic exercise minutes (CPT 63330): 40 minutes      Pain rating (1-10) before treatment:   0  Pain rating (1-10) after treatment:  0, no pain reported on exit      ASSESSMENT:   Response to treatment: Patient tolerates PT treatment well today. Initiated B LE strengthening exercises as well as core stabilization as tolerated. Patient fatigues quickly with activities and requires rest breaks as well as verbal cues for appropriate form. At this time, patient will benefit from continued skilled PT to promote improved strength and balance for increased quality of life, decreased pain, and improved functional mobility skills.    PLAN/RECOMMENDATIONS:   Plan for treatment: therapy treatment to continue next visit.  Planned interventions for next visit: continue with current treatment.

## 2023-07-06 ENCOUNTER — APPOINTMENT (OUTPATIENT)
Dept: PHYSICAL THERAPY | Facility: REHABILITATION | Age: 78
End: 2023-07-06
Payer: MEDICARE

## 2023-07-06 ENCOUNTER — PHYSICAL THERAPY (OUTPATIENT)
Dept: PHYSICAL THERAPY | Facility: REHABILITATION | Age: 78
End: 2023-07-06
Payer: MEDICARE

## 2023-07-06 DIAGNOSIS — M54.50 LOW BACK PAIN, UNSPECIFIED BACK PAIN LATERALITY, UNSPECIFIED CHRONICITY, UNSPECIFIED WHETHER SCIATICA PRESENT: ICD-10-CM

## 2023-07-06 DIAGNOSIS — M25.552 PAIN IN LEFT HIP: ICD-10-CM

## 2023-07-06 DIAGNOSIS — M25.562 CHRONIC PAIN OF LEFT KNEE: ICD-10-CM

## 2023-07-06 DIAGNOSIS — G89.29 CHRONIC PAIN OF LEFT KNEE: ICD-10-CM

## 2023-07-06 PROCEDURE — 97110 THERAPEUTIC EXERCISES: CPT

## 2023-07-06 NOTE — OP THERAPY DAILY TREATMENT
"  Outpatient Physical Therapy  DAILY TREATMENT     [facility] Physical [address].  [address]  [place] [address]  Phone:  [phone]  Fax:  [phone]    Date: 07/06/2023    Patient: Mimi Appiah  YOB: 1945  MRN: [id number]     Time Calculation    Start time: 1345  Stop time: 1427 Time Calculation (min): 42 minutes         Chief Complaint: Hip Problem, Knee Problem, and Back Problem    Visit #: 3    SUBJECTIVE:  Patient presents to PT session and reports she has had visitors for the last 4 days and is tired. Patient reports that she feels like she is building some strength but functional mobility remains hard at home.  Patient reports that she walked to the \"pool\" with the cane this weekend and sat on low furniture. She reports no falls but she did have some unsteadiness due to fatigue.     OBJECTIVE:  Current objective measures:           Therapeutic Exercises (CPT 50882):     1. TUG, 25.7 seconds, with 4WW, 6/29    2. sit<>stands, x5; x3    3. seated marches, x12 B, orange theraband, TrA engagement    4. seated hip abduction, 2x10, orange theraband    5. seated hip adduction, 2x10; 3 second holds    6. seated TrA bracing, x10, 3 second holds    8. NuStep, x10  minutes, level 1, requires short rest break after ~6 minutes; 30-40 SPM demonstrated    20. PN due 7/27; Recert due 9/5      Therapeutic Exercise Summary: Discussed safety during HEP with patient. Patient verbalizes understanding.   Access Code: [email or web address]  URL: [email or web address]  Date: 06/29/2023  Prepared by: Margaret Minaya    Exercises  - Seated Transversus Abdominis Bracing  - 1 x daily - 1 sets - 10 reps - 3 seconds hold  - Seated Hip Abduction  - 1 x daily - 1 sets - 10 reps  - Seated Hip Adduction Isometrics with Ball  - 1 x daily - 1 sets - 10 reps - 3 seconds hold  - Sit to Stand with Counter Support  - 1 x daily - 1 sets - 3 reps      Time-based treatments/modalities:    Physical Therapy " Timed Treatment Charges  Therapeutic exercise minutes (CPT 08513): 42 minutes      Pain rating (1-10) before treatment:  5; low back pain   Pain rating (1-10) after treatment:  3; some R knee discomfort     ASSESSMENT:   Response to treatment: Patient tolerates PT treatment well today. Increased activity to tolerance to promote improved strength and functional activity tolerance. Updated HEP to include orange TB for lower extremity activities. At this time, patient will benefit from continued skilled PT to promote further strength and balance for decreased pain and improved functional mobility skills.     PLAN/RECOMMENDATIONS:   Plan for treatment: therapy treatment to continue next visit.  Planned interventions for next visit: continue with current treatment.

## 2023-07-11 ENCOUNTER — APPOINTMENT (OUTPATIENT)
Dept: PHYSICAL THERAPY | Facility: REHABILITATION | Age: 78
End: 2023-07-11
Payer: MEDICARE

## 2023-07-11 ENCOUNTER — PHYSICAL THERAPY (OUTPATIENT)
Dept: PHYSICAL THERAPY | Facility: REHABILITATION | Age: 78
End: 2023-07-11
Payer: MEDICARE

## 2023-07-11 DIAGNOSIS — M25.562 CHRONIC PAIN OF LEFT KNEE: ICD-10-CM

## 2023-07-11 DIAGNOSIS — M54.50 LOW BACK PAIN, UNSPECIFIED BACK PAIN LATERALITY, UNSPECIFIED CHRONICITY, UNSPECIFIED WHETHER SCIATICA PRESENT: ICD-10-CM

## 2023-07-11 DIAGNOSIS — G89.29 CHRONIC PAIN OF LEFT KNEE: ICD-10-CM

## 2023-07-11 DIAGNOSIS — M25.552 PAIN IN LEFT HIP: ICD-10-CM

## 2023-07-11 PROCEDURE — 97110 THERAPEUTIC EXERCISES: CPT

## 2023-07-11 NOTE — OP THERAPY DAILY TREATMENT
Outpatient Physical Therapy  DAILY TREATMENT     [facility] Physical [address]  [address]  [place] [address]  Phone:  [phone]  Fax:  [phone]    Date: 07/11/2023    Patient: Mimi Appiah  YOB: 1945  MRN: [id number]     Time Calculation    Start time: 1349  Stop time: 1430 Time Calculation (min): 41 minutes         Chief Complaint: Hip Problem, Knee Problem, and Back Problem    Visit #: 4    SUBJECTIVE:  Patient reports that she has had no falls since last visit. She reports she has been doing more walking. She states she feels like PT is helping.     OBJECTIVE:  Current objective measures:            Therapeutic Exercises (CPT 19413):     1. TUG, 25.7 seconds, with 4WW, 6/29    2. sit<>stands, 2x5    3. seated marches, x12 B, orange theraband, nt    4. seated hip abduction, 2x10, orange theraband, nt    5. seated hip adduction, 2x10; 3 second holds, nt    6. seated TrA bracing, x10, 3 second holds    8. NuStep, x10  minutes, level 1, 35 steps per minute or above; no required rest break this date    10. standing marches, x10, B LE, B UE support    11. piriformis stretching, 2 x 30 seconds, B, patient reports tightness and points to piriformis on L side    12. standing hip abduction, 2x5, B LE; B UE support, fatigues quickly    13. seated heel raises, 2x 20    20. PN due 7/27; Recert due 9/5      Therapeutic Exercise Summary: Discussed safety during HEP with patient. Patient verbalizes understanding.   Access Code: [email or web address]  URL: [email or web address]  Date: 06/29/2023  Prepared by: Margaret Minaya    Exercises  - Seated Transversus Abdominis Bracing  - 1 x daily - 1 sets - 10 reps - 3 seconds hold  - Seated Hip Abduction  - 1 x daily - 1 sets - 10 reps  - Seated Hip Adduction Isometrics with Ball  - 1 x daily - 1 sets - 10 reps - 3 seconds hold  - Sit to Stand with Counter Support  - 1 x daily - 1 sets - 3 reps    Access Code: [email or web address]  URL:  [email or web address]  Date: 07/11/2023  Prepared by: Margaret Minaya    Exercises  - Seated Piriformis Stretch with Trunk Bend  - 1-2 x daily - 2 sets - 30 seconds hold  - Standing March with Counter Support  - 1 x daily - 1 sets - 10 reps  - Seated Heel Raise  - 1 x daily - 1 sets - 20 reps      Time-based treatments/modalities:    Physical Therapy Timed Treatment Charges  Therapeutic exercise minutes (CPT 01092): 41 minutes      Pain rating (1-10) before treatment:  5; R knee   Pain rating (1-10) after treatment:  no change in pain reported     ASSESSMENT:   Response to treatment: Patient tolerates PT treatment well today. Increased HEP to promote further strength and balance with standing exercises. Patient demonstrates difficulty with B hip abduction in standing, with L LE weakness noted in stance. At this time, patient will continue to benefit from skilled PT to promote further strength and balance for increased safety and quality of life with functional mobility skills.     PLAN/RECOMMENDATIONS:   Plan for treatment: therapy treatment to continue next visit.  Planned interventions for next visit: continue with current treatment.

## 2023-07-13 ENCOUNTER — APPOINTMENT (OUTPATIENT)
Dept: PHYSICAL THERAPY | Facility: REHABILITATION | Age: 78
End: 2023-07-13
Payer: MEDICARE

## 2023-07-13 ENCOUNTER — PHYSICAL THERAPY (OUTPATIENT)
Dept: PHYSICAL THERAPY | Facility: REHABILITATION | Age: 78
End: 2023-07-13
Payer: MEDICARE

## 2023-07-13 DIAGNOSIS — M25.562 CHRONIC PAIN OF LEFT KNEE: ICD-10-CM

## 2023-07-13 DIAGNOSIS — M25.552 PAIN IN LEFT HIP: ICD-10-CM

## 2023-07-13 DIAGNOSIS — G89.29 CHRONIC PAIN OF LEFT KNEE: ICD-10-CM

## 2023-07-13 DIAGNOSIS — M54.50 LOW BACK PAIN, UNSPECIFIED BACK PAIN LATERALITY, UNSPECIFIED CHRONICITY, UNSPECIFIED WHETHER SCIATICA PRESENT: ICD-10-CM

## 2023-07-13 PROCEDURE — 97110 THERAPEUTIC EXERCISES: CPT

## 2023-07-13 NOTE — OP THERAPY DAILY TREATMENT
"  Outpatient Physical Therapy  DAILY TREATMENT     [facility] Physical [address].  [address]  [place] [address]  Phone:  [phone]  Fax:  [phone]    Date: 07/13/2023    Patient: Mimi Appiah  YOB: 1945  MRN: [id number]     Time Calculation    Start time: 1347  Stop time: 1427 Time Calculation (min): 40 minutes         Chief Complaint: Knee Problem, Hip Problem, and Back Problem    Visit #: 5    SUBJECTIVE:  Patient reports some L shoulder pain today. She reports she slept on it all night and now is having some pain. Reports she did her HEP to tolerance but did not complete standing marches due to safety and not having a good place for support at home. Patient fatigues quickly today and reports she did a lot of getting in/out of her grandchild's low car yesterday, which is difficult and she feels like she maybe did \"a little too much.\"     Patient reports she is hoping to call and get back on meals and wheels.     OBJECTIVE:  Current objective measures:             Therapeutic Exercises (CPT 87082):     1. TUG, 25.7 seconds, with 4WW, 6/29    2. sit<>stands, 2x5    3. seated marches, 2x15, B pink theraband    4. seated hip abduction, 2x15, B pink theraband    5. seated hip adduction, x15, 3 second pillow squeezes    6. seated TrA bracing, x10, 3 second holds    8. NuStep, x10  minutes, level 1, nt    10. standing marches, x10, B LE, B UE support    11. piriformis stretching, 2 x 30 seconds, B, patient reports tightness and points to piriformis on L side    12. standing hip abduction, 2x5, B LE; B UE support, nt    13. seated heel raises, 2x 20    17. Educated and discussed energy conservation and appropriate rest breaks with patient as patient reports that she feels like she \"over did it\" yesterday when shopping and transitioning in and out of the car. Patient verbalizes understanding and reports that she has been \"thinking about that\" already.    20. PN due 7/27; " Recert due 9/5      Therapeutic Exercise Summary: Discussed safety during HEP with patient. Patient verbalizes understanding.   Access Code: [email or web address]  URL: [email or web address]  Date: 06/29/2023  Prepared by: Margaret Buening    Exercises  - Seated Transversus Abdominis Bracing  - 1 x daily - 1 sets - 10 reps - 3 seconds hold  - Seated Hip Abduction  - 1 x daily - 1 sets - 10 reps  - Seated Hip Adduction Isometrics with Ball  - 1 x daily - 1 sets - 10 reps - 3 seconds hold  - Sit to Stand with Counter Support  - 1 x daily - 1 sets - 3 reps    Access Code: [email or web address]  URL: [email or web address]  Date: 07/11/2023  Prepared by: Margaret Buening    Exercises  - Seated Piriformis Stretch with Trunk Bend  - 1-2 x daily - 2 sets - 30 seconds hold  - Standing March with Counter Support  - 1 x daily - 1 sets - 10 reps  - Seated Heel Raise  - 1 x daily - 1 sets - 20 reps      Time-based treatments/modalities:    Physical Therapy Timed Treatment Charges  Therapeutic exercise minutes (CPT 79235): 40 minutes      Pain rating (1-10) before treatment:  5; L shoulder, reports feeling like she slept on it wrong   Pain rating (1-10) after treatment:  reports no change in pain    ASSESSMENT:   Response to treatment: Patient tolerates PT treatment well today. Appears to fatigue a little more easy this date with some unsteadiness noted but no overt LOB. At this time, patient will benefit from continued skilled PT to promote further strength and balance for decreased pain and improved quality of life.     PLAN/RECOMMENDATIONS:   Plan for treatment: therapy treatment to continue next visit.  Planned interventions for next visit: continue with current treatment.

## 2023-07-18 ENCOUNTER — APPOINTMENT (OUTPATIENT)
Dept: PHYSICAL THERAPY | Facility: REHABILITATION | Age: 78
End: 2023-07-18
Payer: MEDICARE

## 2023-07-18 ENCOUNTER — PHYSICAL THERAPY (OUTPATIENT)
Dept: PHYSICAL THERAPY | Facility: REHABILITATION | Age: 78
End: 2023-07-18
Payer: MEDICARE

## 2023-07-18 DIAGNOSIS — M25.562 CHRONIC PAIN OF LEFT KNEE: ICD-10-CM

## 2023-07-18 DIAGNOSIS — G89.29 CHRONIC PAIN OF LEFT KNEE: ICD-10-CM

## 2023-07-18 DIAGNOSIS — M25.552 PAIN IN LEFT HIP: ICD-10-CM

## 2023-07-18 DIAGNOSIS — M54.50 LOW BACK PAIN, UNSPECIFIED BACK PAIN LATERALITY, UNSPECIFIED CHRONICITY, UNSPECIFIED WHETHER SCIATICA PRESENT: ICD-10-CM

## 2023-07-18 PROCEDURE — 97110 THERAPEUTIC EXERCISES: CPT

## 2023-07-18 NOTE — OP THERAPY DAILY TREATMENT
"  Outpatient Physical Therapy  DAILY TREATMENT     [facility] Physical [address].  [address]  [place] [address]  Phone:  [phone]  Fax:  [phone]    Date: 07/18/2023    Patient: Mimi Appiah  YOB: 1945  MRN: [id number]     Time Calculation    Start time: 1339  Stop time: 1429 Time Calculation (min): 50 minutes         Chief Complaint: Back Problem    Visit #: 6    SUBJECTIVE:  Patient presents to PT session and reports minimal pain. She reports fatigue today.     OBJECTIVE:  Current objective measures:           Therapeutic Exercises (CPT 95226):     1. TUG, 25.7 seconds, with 4WW, 6/29    2. sit<>stands, 2x5, x1 LOB with self recovery demonstrated    3. seated marches, 2x15, B pink theraband, nt    4. seated hip abduction, 2x15, B pink theraband    5. seated hip adduction, x15, 3 second pillow squeezes, nt    6. seated TrA bracing, x10, 3 second holds    8. NuStep, x10  minutes, level 1, 50+ SPM    10. standing marches, 2x10, B LE, B UE support    11. piriformis stretching, 2 x 30 seconds, B    12. standing hip abduction, 2x10, B LE; B UE support, improved elevation of B LE this date    13. standing heel raises, 2x 10    17. Educated and discussed energy conservation and appropriate rest breaks with patient as patient reports that she feels like she \"over did it\" yesterday when shopping and transitioning in and out of the car. Patient verbalizes understanding and reports that she has been \"thinking about that\" already.    20. PN due 7/27; Recert due 9/5      Therapeutic Exercise Summary: Discussed safety during HEP with patient. Patient verbalizes understanding.   Access Code: [email or web address]  URL: [email or web address]  Date: 06/29/2023  Prepared by: Margaret Minaya    Exercises  - Seated Transversus Abdominis Bracing  - 1 x daily - 1 sets - 10 reps - 3 seconds hold  - Seated Hip Abduction  - 1 x daily - 1 sets - 10 reps  - Seated Hip Adduction Isometrics with " Ball  - 1 x daily - 1 sets - 10 reps - 3 seconds hold  - Sit to Stand with Counter Support  - 1 x daily - 1 sets - 3 reps    Access Code: [email or web address]  URL: [email or web address]  Date: 07/11/2023  Prepared by: Margaret Minaya    Exercises  - Seated Piriformis Stretch with Trunk Bend  - 1-2 x daily - 2 sets - 30 seconds hold  - Standing March with Counter Support  - 1 x daily - 1 sets - 10 reps  - Seated Heel Raise  - 1 x daily - 1 sets - 20 reps      Time-based treatments/modalities:    Physical Therapy Timed Treatment Charges  Therapeutic exercise minutes (CPT 88529): 50 minutes      Pain rating (1-10) before treatment:  0; reports more fatigue rather than pain  Pain rating (1-10) after treatment:  0    ASSESSMENT:   Response to treatment: Patient tolerates PT treatment well today. Patient continues to fatigue quickly but demonstrates improving strength with increased ease during exercises noted. Patient will continue to benefit from strength and balance training to promote increased safety and quality of life.     PLAN/RECOMMENDATIONS:   Plan for treatment: therapy treatment to continue next visit.  Planned interventions for next visit: continue with current treatment.

## 2023-07-20 ENCOUNTER — APPOINTMENT (OUTPATIENT)
Dept: MEDICAL GROUP | Facility: MEDICAL CENTER | Age: 78
End: 2023-07-20
Payer: MEDICARE

## 2023-07-24 ENCOUNTER — DOCUMENTATION (OUTPATIENT)
Dept: HEALTH INFORMATION MANAGEMENT | Facility: OTHER | Age: 78
End: 2023-07-24
Payer: MEDICARE

## 2023-07-25 ENCOUNTER — APPOINTMENT (OUTPATIENT)
Dept: PHYSICAL THERAPY | Facility: REHABILITATION | Age: 78
End: 2023-07-25
Payer: MEDICARE

## 2023-07-25 ENCOUNTER — PHYSICAL THERAPY (OUTPATIENT)
Dept: PHYSICAL THERAPY | Facility: REHABILITATION | Age: 78
End: 2023-07-25
Payer: MEDICARE

## 2023-07-25 DIAGNOSIS — M25.552 PAIN IN LEFT HIP: ICD-10-CM

## 2023-07-25 DIAGNOSIS — M25.562 CHRONIC PAIN OF LEFT KNEE: ICD-10-CM

## 2023-07-25 DIAGNOSIS — M54.50 LOW BACK PAIN, UNSPECIFIED BACK PAIN LATERALITY, UNSPECIFIED CHRONICITY, UNSPECIFIED WHETHER SCIATICA PRESENT: ICD-10-CM

## 2023-07-25 DIAGNOSIS — G89.29 CHRONIC PAIN OF LEFT KNEE: ICD-10-CM

## 2023-07-25 PROCEDURE — 97110 THERAPEUTIC EXERCISES: CPT

## 2023-07-25 NOTE — OP THERAPY DAILY TREATMENT
Outpatient Physical Therapy  DAILY TREATMENT     [facility] Physical [address].  [address]  [place] [address]  Phone:  [phone]  Fax:  [phone]    Date: 07/25/2023    Patient: Mimi Appiah  YOB: 1945  MRN: [id number]     Time Calculation    Start time: 1346  Stop time: 1430 Time Calculation (min): 44 minutes         Chief Complaint: Back Problem    Visit #: 7    SUBJECTIVE:  Patient presents to PT session and reports that she has been doing her HEP. She states that her neighbors have been keeping her up at night and that she hasn't been sleeping well. She reports she is noticing some improvement in strength at home.     OBJECTIVE:  Current objective measures:     TUG: Average: 21.49 seconds; 4 WW utilized for safety; fearful of transitions from sit<>stand   Trial 1: 21.62 seconds  Trial 2: 21.15 seconds  Trial 3: 21.70 seconds    Strength:       Left Hip   Planes of Motion   Flexion: 3+  Abduction: 5  Adduction: 5     Right Hip   Planes of Motion   Flexion: 4  Abduction: 5  Adduction: 5     Left Knee   Flexion: 4-  Extension: 4     Right Knee   Flexion: 4+  Extension: 4+     Left Ankle/Foot   Dorsiflexion: 5  Plantar flexion: 5     Right Ankle/Foot   Dorsiflexion: 5  Plantar flexion: 5  WOMAC Grand Total: 63.54 (Previously noted from 83.33)    5x sit<>stand: 38.08 seconds       Therapeutic Exercises (CPT 12612):     1. TUG with 4WW, 21.49 seconds, 7/25    2. 5x sit<>stand, 38.08 seconds, 7/25    3. seated marches, x10, B pink theraband    4. seated hip abduction, 2x15, B pink theraband, nt    5. seated hip adduction, x15, 3 second pillow squeezes, nt    6. seated TrA bracing, x10, 3 second holds, nt    8. NuStep, x10  minutes, level 1, 50+ SPM; nt    10. standing marches, x15, B UE support    11. piriformis stretching, 2 x 30 seconds, B, nt    12. standing hip abduction, 2x10, B LE; B UE support, nt    13. standing heel raises, 2x 10, nt    17. Educated and  "discussed energy conservation and appropriate rest breaks with patient as patient reports that she feels like she \"over did it\" yesterday when shopping and transitioning in and out of the car. Patient verbalizes understanding and reports that she has been \"thinking about that\" already.      Therapeutic Exercise Summary: Discussed safety during HEP with patient. Patient verbalizes understanding.   Access Code: [email or web address]  URL: [email or web address]  Date: 06/29/2023  Prepared by: Margaret Buening    Exercises  - Seated Transversus Abdominis Bracing  - 1 x daily - 1 sets - 10 reps - 3 seconds hold  - Seated Hip Abduction  - 1 x daily - 1 sets - 10 reps  - Seated Hip Adduction Isometrics with Ball  - 1 x daily - 1 sets - 10 reps - 3 seconds hold  - Sit to Stand with Counter Support  - 1 x daily - 1 sets - 3 reps    Access Code: [email or web address]  URL: [email or web address]  Date: 07/11/2023  Prepared by: Margaret Buening    Exercises  - Seated Piriformis Stretch with Trunk Bend  - 1-2 x daily - 2 sets - 30 seconds hold  - Standing March with Counter Support  - 1 x daily - 1 sets - 10 reps  - Seated Heel Raise  - 1 x daily - 1 sets - 20 reps      Time-based treatments/modalities:    Physical Therapy Timed Treatment Charges  Therapeutic exercise minutes (CPT 46200): 44 minutes      Pain rating (1-10) before treatment:  5; B hips  Pain rating (1-10) after treatment:  5, reports no change in pain     Goals:   Short Term Goals:   1. Patient will demonstrate independent HEP to promote increased strength for improved functional activity tolerance and mobility skills.-Progressing, continue     2. Patient will demonstrate improved B LE strength to grossly 4/5 to promote improved functional mobility skills and gait mechanics. -Progressing, remains limited with L hip and knee flexion     3. Patient will complete further balance assessment to determine safety/risk of falls. -MET, Completed TUG      Short term goal time span:  2-4 " weeks      Long Term Goals:    1. Patient will report improved WOMAC to indicate increased quality of life.-MET, Continue      2. Patient will demonstrate improved B LE strength to grossly 5/5 to promote improved functional mobility skills.-progressing     3. Patient will demonstrate improved balance assessment, as appropriate based on initial assessment.-MET, continue      4. Patient will report improved pain to allow for increased mobility and ability to participate in daily tasks. -Not Met        Long term goal time span:  2-4 months    ASSESSMENT:   Response to treatment: Patient tolerates PT treatments well thus far. Progression toward strength goals is noted, as well as improved TUG score. Patient continues to demonstrate difficulty with sit<>stands and overall functional activity tolerance, with some back pain remaining. Patient is reporting improving tolerance for some activities at home. At this time, patient will benefit from continued skilled PT to promote further progress with strength and balance for improved functional mobility skills and quality of life.     PLAN/RECOMMENDATIONS:   Plan for treatment: therapy treatment to continue next visit.  Planned interventions for next visit: continue with current treatment.

## 2023-07-26 NOTE — OP THERAPY PROGRESS SUMMARY
Outpatient Physical Therapy  PROGRESS SUMMARY NOTE      [facility] [place] [address]  [address]  [place] [address]  Phone:  [phone]  Fax:  [phone]    Date of Visit: 07/25/2023    Patient: Mimi Appiah  YOB: 1945  MRN: [id number]     Referring Provider: ROCIO Delacruz  [facility] [address]  [place] [address]   Referring Diagnosis Pain in left hip [M25.552];Pain in left knee [M25.562];Other chronic pain [G89.29]     Visit Diagnoses     ICD-10-CM   1. Chronic pain of left knee  M25.562    G89.29   2. Pain in left hip  M25.552   3. Low back pain, unspecified back pain laterality, unspecified chronicity, unspecified whether sciatica present  M54.50       Rehab Potential: fair to good    Progress Report Period: 6/27/23-7/25/23    Functional Assessment Used  WOMAC Grand Total: 63.54       Objective Findings and Assessment:   Patient progression towards goals: Goals:   Short Term Goals:   1. Patient will demonstrate independent HEP to promote increased strength for improved functional activity tolerance and mobility skills.-Progressing, continue     2. Patient will demonstrate improved B LE strength to grossly 4/5 to promote improved functional mobility skills and gait mechanics. -Progressing, remains limited with L hip and knee flexion     3. Patient will complete further balance assessment to determine safety/risk of falls. -MET, Completed TUG      Short term goal time span:  2-4 weeks      Long Term Goals:    1. Patient will report improved WOMAC to indicate increased quality of life.-MET, Continue      2. Patient will demonstrate improved B LE strength to grossly 5/5 to promote improved functional mobility skills.-progressing     3. Patient will demonstrate improved balance assessment, as appropriate based on initial assessment.-MET, continue      4. Patient will report improved pain to allow for increased mobility and ability to participate  in daily tasks. -Not Met     Objective findings and assessment details: Current objective measures:      TUG: Average: 21.49 seconds; 4 WW utilized for safety; fearful of transitions from sit<>stand   Trial 1: 21.62 seconds  Trial 2: 21.15 seconds  Trial 3: 21.70 seconds     Strength:       Left Hip   Planes of Motion   Flexion: 3+  Abduction: 5  Adduction: 5     Right Hip   Planes of Motion   Flexion: 4  Abduction: 5  Adduction: 5     Left Knee   Flexion: 4-  Extension: 4     Right Knee   Flexion: 4+  Extension: 4+     Left Ankle/Foot   Dorsiflexion: 5  Plantar flexion: 5     Right Ankle/Foot   Dorsiflexion: 5  Plantar flexion: 5  WOMAC Grand Total: 63.54 (Previously noted from 83.33)     5x sit<>stand: 38.08 seconds     ASSESSMENT:   Response to treatment: Patient tolerates PT treatments well thus far. Progression toward strength goals is noted, as well as improved TUG score. Patient continues to demonstrate difficulty with sit<>stands and overall functional activity tolerance, with some back pain remaining. Patient is reporting improving tolerance for some activities at home. At this time, patient will benefit from continued skilled PT to promote further progress with strength and balance for improved functional mobility skills and quality of life.    Goals:   Short Term Goals:   1. Patient will demonstrate independent HEP to promote increased strength for improved functional activity tolerance and mobility skills.     2. Patient will demonstrate improved B LE strength to grossly 4/5 to promote improved functional mobility skills and gait mechanics.      3. Patient will complete 5x sit<> under 30 seconds.   Short term goal time span:  2-4 weeks      Long Term Goals:    1. Patient will report improved WOMAC to indicate increased quality of life.     2. Patient will demonstrate improved B LE strength to grossly 5/5 to promote improved functional mobility skills.     3. Patient will demonstrate improved TUG  score to below 15 seconds to promote decreased risk for fall.       4. Patient will report improved pain to allow for increased mobility and ability to participate in daily tasks.  Long term goal time span:  6-8 weeks    Plan:   Planned therapy interventions:  Neuromuscular Re-education (CPT 80401), Manual Therapy (CPT 90993), Therapeutic Exercise (CPT 33287), Therapeutic Activities (CPT 24011), E Stim Unattended (CPT 29645) and Hot or Cold Pack Therapy (CPT 62590)  Frequency:  1x week  Duration in weeks:  8      Referring provider co-signature:  I have reviewed this plan of care and my co-signature certifies the need for services.     Certification Period: 07/25/2023 to 09/20/23    Physician Signature: ________________________________ Date: ______________

## 2023-08-01 ENCOUNTER — APPOINTMENT (OUTPATIENT)
Dept: PHYSICAL THERAPY | Facility: REHABILITATION | Age: 78
End: 2023-08-01
Payer: MEDICARE

## 2023-08-01 ENCOUNTER — PHYSICAL THERAPY (OUTPATIENT)
Dept: PHYSICAL THERAPY | Facility: REHABILITATION | Age: 78
End: 2023-08-01
Payer: MEDICARE

## 2023-08-01 DIAGNOSIS — M25.562 CHRONIC PAIN OF LEFT KNEE: ICD-10-CM

## 2023-08-01 DIAGNOSIS — M25.552 PAIN IN LEFT HIP: ICD-10-CM

## 2023-08-01 DIAGNOSIS — G89.29 CHRONIC PAIN OF LEFT KNEE: ICD-10-CM

## 2023-08-01 DIAGNOSIS — M54.50 LOW BACK PAIN, UNSPECIFIED BACK PAIN LATERALITY, UNSPECIFIED CHRONICITY, UNSPECIFIED WHETHER SCIATICA PRESENT: ICD-10-CM

## 2023-08-01 PROCEDURE — 97110 THERAPEUTIC EXERCISES: CPT

## 2023-08-01 NOTE — OP THERAPY DAILY TREATMENT
Outpatient Physical Therapy  DAILY TREATMENT     [facility] Physical [address].  [address]  [place] [address]  Phone:  [phone]  Fax:  [phone]    Date: 08/01/2023    Patient: Mimi Appiah  YOB: 1945  MRN: [id number]     Time Calculation    Start time: 1403  Stop time: 1430 Time Calculation (min): 27 minutes         Chief Complaint: Hip Problem, Knee Problem, and Back Problem    Visit #: 8    SUBJECTIVE:  Patient presents to PT session late secondary to issues with transportation, therefore shortened session this date.     Patient reports some R calf pain/tenderness. Negative Melany's Sign. No SOB. No redness or swelling observed. After conversation, patient reports she does remember hitting the back of her leg on her electric wheel chair. She does has a small bruise near point of pain. Also discussed with patient signs/symptoms of DVT and need to present to ER if concerns arise. Patient verbalizes understanding.     OBJECTIVE:  Current objective measures:   R calf: assessed due to report of pain/tenderness  Negative Melany's Sign, No redness, per patient R LE is larger at baseline due previous injury (years ago) and reports she does not feel like there is swelling noted. Small bruise is present. No SOB. Patient reports hitting the back of her leg on her electric wheel chair. Patient verbalizes understanding of when to present for further care regarding s/s of DVT.           Therapeutic Exercises (CPT 01224):     1. TUG with 4WW, 21.49 seconds, 7/25    2. 5x sit<>stand, 38.08 seconds, 7/25    3. seated marches, x20 B, pink theraband    4. seated hip abduction, x20 B, pink theraband    5. seated hip adduction, x15, 3 second pillow squeezes, nt    6. seated TrA bracing, x10, 3 second holds, nt    8. NuStep, x7  minutes, level 3, 50+ SPM    9. sit<>stands, x10, UE support as needed    10. standing marches, x15, B UE support, nt    11. piriformis stretching, 2 x 30  "seconds, B    12. standing hip abduction, 2x10, B LE; B UE support, nt    13. standing heel raises, 2x 10, nt    17. Educated and discussed energy conservation and appropriate rest breaks with patient as patient reports that she feels like she \"over did it\" yesterday when shopping and transitioning in and out of the car. Patient verbalizes understanding and reports that she has been \"thinking about that\" already.      Therapeutic Exercise Summary: Discussed safety during HEP with patient. Patient verbalizes understanding.   Access Code: [email or web address]  URL: [email or web address]  Date: 06/29/2023  Prepared by: Margaret Buening    Exercises  - Seated Transversus Abdominis Bracing  - 1 x daily - 1 sets - 10 reps - 3 seconds hold  - Seated Hip Abduction  - 1 x daily - 1 sets - 10 reps  - Seated Hip Adduction Isometrics with Ball  - 1 x daily - 1 sets - 10 reps - 3 seconds hold  - Sit to Stand with Counter Support  - 1 x daily - 1 sets - 3 reps    Access Code: [email or web address]  URL: [email or web address]  Date: 07/11/2023  Prepared by: Margaret Buening    Exercises  - Seated Piriformis Stretch with Trunk Bend  - 1-2 x daily - 2 sets - 30 seconds hold  - Standing March with Counter Support  - 1 x daily - 1 sets - 10 reps  - Seated Heel Raise  - 1 x daily - 1 sets - 20 reps      Time-based treatments/modalities:    Physical Therapy Timed Treatment Charges  Therapeutic exercise minutes (CPT 67590): 27 minutes      Pain rating (1-10) before treatment:  5  Pain rating (1-10) after treatment:  5    ASSESSMENT:   Response to treatment: Patient tolerates PT treatment well today. Shortened session due to time constraints. Assessed calf pain, Negative Homans, negative redness, patient reports no increase in leg size and states she did hit it on her wheelchair at home. Discussed with patient signs/symptoms for DVT and when to present to ER with patient verbalizing understanding. At this time, patient will benefit from continued " skilled PT to promote further strength and balance for decreased pain and improved mobility.     PLAN/RECOMMENDATIONS:   Plan for treatment: therapy treatment to continue next visit.  Planned interventions for next visit: continue with current treatment.

## 2023-08-03 ENCOUNTER — APPOINTMENT (OUTPATIENT)
Dept: PHYSICAL THERAPY | Facility: REHABILITATION | Age: 78
End: 2023-08-03
Payer: MEDICARE

## 2023-08-04 ENCOUNTER — PHYSICAL THERAPY (OUTPATIENT)
Dept: PHYSICAL THERAPY | Facility: REHABILITATION | Age: 78
End: 2023-08-04
Payer: MEDICARE

## 2023-08-04 DIAGNOSIS — M25.562 CHRONIC PAIN OF LEFT KNEE: ICD-10-CM

## 2023-08-04 DIAGNOSIS — M54.50 LOW BACK PAIN, UNSPECIFIED BACK PAIN LATERALITY, UNSPECIFIED CHRONICITY, UNSPECIFIED WHETHER SCIATICA PRESENT: ICD-10-CM

## 2023-08-04 DIAGNOSIS — G89.29 CHRONIC PAIN OF LEFT KNEE: ICD-10-CM

## 2023-08-04 DIAGNOSIS — M25.552 PAIN IN LEFT HIP: ICD-10-CM

## 2023-08-04 PROCEDURE — 97110 THERAPEUTIC EXERCISES: CPT

## 2023-08-04 NOTE — OP THERAPY DAILY TREATMENT
"  Outpatient Physical Therapy  DAILY TREATMENT     [facility] Physical [address].  [address]  [place] [address]  Phone:  [phone]  Fax:  [phone]    Date: 08/04/2023    Patient: Mimi Appiah  YOB: 1945  MRN: [id number]     Time Calculation    Start time: 1301  Stop time: 1341 Time Calculation (min): 40 minutes         Chief Complaint: Knee Problem, Hip Problem, and Back Problem    Visit #: 9    SUBJECTIVE:  Patient presents to PT session and reports no falls. She states she is feeling pretty good today. She reports her previous calf pain has improved. Patient reports she feels like she can \"feel\" her feet again when walking.     OBJECTIVE:  Current objective measures:           Therapeutic Exercises (CPT 27753):     1. TUG with 4WW, 21.49 seconds, 7/25    2. 5x sit<>stand, 38.08 seconds, 7/25    3. seated marches, x20 B, pink theraband, nt    4. seated hip abduction, x20 B, pink theraband, nt    5. seated hip adduction, x15, 3 second pillow squeezes, nt    6. seated TrA bracing, x10, 3 second holds    8. NuStep, x10 minutes, level 4, 50+ SPM    9. sit<>stands, x10, minimal UE support utilized    10. standing marches, 2x20, B, UE support    11. piriformis stretching, 3 x 30 seconds, B    12. standing hip abduction, 2x10, B, UE support    13. standing heel raises, x20    15. Gait with FWW, demonstrates improving stability, x150'; no LOB, fluid movement noted    17. Educated and discussed energy conservation and appropriate rest breaks with patient as patient reports that she feels like she \"over did it\" yesterday when shopping and transitioning in and out of the car. Patient verbalizes understanding and reports that she has been \"thinking about that\" already.    20. PN due 8/25; 9/20      Therapeutic Exercise Summary: Discussed safety during HEP with patient. Patient verbalizes understanding.   Access Code: [email or web address]  URL: [email or web address]  Date: " 06/29/2023  Prepared by: Margaret Buening    Exercises  - Seated Transversus Abdominis Bracing  - 1 x daily - 1 sets - 10 reps - 3 seconds hold  - Seated Hip Abduction  - 1 x daily - 1 sets - 10 reps  - Seated Hip Adduction Isometrics with Ball  - 1 x daily - 1 sets - 10 reps - 3 seconds hold  - Sit to Stand with Counter Support  - 1 x daily - 1 sets - 3 reps    Access Code: [email or web address]  URL: [email or web address]  Date: 07/11/2023  Prepared by: Margaret Buening    Exercises  - Seated Piriformis Stretch with Trunk Bend  - 1-2 x daily - 2 sets - 30 seconds hold  - Standing March with Counter Support  - 1 x daily - 1 sets - 10 reps  - Seated Heel Raise  - 1 x daily - 1 sets - 20 reps      Time-based treatments/modalities:    Physical Therapy Timed Treatment Charges  Therapeutic exercise minutes (CPT 89526): 40 minutes      Pain rating (1-10) before treatment:  5; R knee   Pain rating (1-10) after treatment:  pain not reported on exit, no apparent distress noted     ASSESSMENT:   Response to treatment: Patient tolerates PT treatment well today. Continued with strength and balance activities today to promote improved pain and functional mobility skills. Continues to be limited by R knee pain.     PLAN/RECOMMENDATIONS:   Plan for treatment: therapy treatment to continue next visit.  Planned interventions for next visit: continue with current treatment.

## 2023-08-10 ENCOUNTER — APPOINTMENT (OUTPATIENT)
Dept: PHYSICAL THERAPY | Facility: REHABILITATION | Age: 78
End: 2023-08-10
Payer: MEDICARE

## 2023-08-17 ENCOUNTER — PHYSICAL THERAPY (OUTPATIENT)
Dept: PHYSICAL THERAPY | Facility: REHABILITATION | Age: 78
End: 2023-08-17
Payer: MEDICARE

## 2023-08-17 DIAGNOSIS — M25.562 CHRONIC PAIN OF LEFT KNEE: ICD-10-CM

## 2023-08-17 DIAGNOSIS — M54.50 LOW BACK PAIN, UNSPECIFIED BACK PAIN LATERALITY, UNSPECIFIED CHRONICITY, UNSPECIFIED WHETHER SCIATICA PRESENT: ICD-10-CM

## 2023-08-17 DIAGNOSIS — G89.29 CHRONIC PAIN OF LEFT KNEE: ICD-10-CM

## 2023-08-17 DIAGNOSIS — M25.552 PAIN IN LEFT HIP: ICD-10-CM

## 2023-08-17 PROCEDURE — 97110 THERAPEUTIC EXERCISES: CPT

## 2023-08-17 NOTE — OP THERAPY DAILY TREATMENT
"  Outpatient Physical Therapy  DAILY TREATMENT     [facility] Physical [address].  [address]  [place] [address]  Phone:  [phone]  Fax:  [phone]    Date: 08/17/2023    Patient: Mimi Appiah  YOB: 1945  MRN: [id number]     Time Calculation    Start time: 1430  Stop time: 1514 Time Calculation (min): 44 minutes         Chief Complaint: Knee Problem, Hip Problem, and Back Problem    Visit #: 10    SUBJECTIVE:  Patient reports she is tired today because she is \"77\". Denies falling but reports she had R knee pain when moving into R knee flexion and R hip internal rotation with some body weight on her leg when making her bed on Tuesday. She reports intermittent R knee pain since.  Patient reports her R hip and back is feeling better.     OBJECTIVE:  Current objective measures:     R knee: observed R knee, appears mildly swollen however patient reports she feels like her knee \"always looks like that\" and is \"bigger.\" No warmth, no redness.     Minimal tenderness to palpation; patient reports pain is \"shooting\" and states she feels like it might be getting stuck at the same time; reports intermittent occurrences    McMurrays: Negative  Patellar apprehension: Negative  Patellar compression: Negative   Anterior Drawer: Negative  Posterior Drawer: Negative  Valgus Stress: Negative  Varus Stress: Negative     Discussed utilizing ice for knee to assist with any pain and swelling, also discussed elevation; discussed safety regarding ice and mobility with patient who verbalizes understanding; also discussed with patient speaking with MD if pain worsens or does not get better, or if patient feels like she needs further assessment at any given time; patient verbalizes understanding and is in agreement.     Melany's Sign: Negative          Therapeutic Exercises (CPT 95554):     1. TUG with 4WW, 21.49 seconds, 7/25    2. 5x sit<>stand, 38.08 seconds, 7/25    3. seated marches, x20 " "B, pink theraband, nt    4. seated hip abduction, x20 B, pink theraband, nt    5. seated hip adduction, x15, 3 second pillow squeezes, nt    6. seated TrA bracing, x10, 3 second holds, nt    8. NuStep, x10 minutes, level 4, 50+ SPM; nt    9. sit<>stands, x10, nt    10. standing marches, 2x20, B, UE support, denies pain with task    11. piriformis stretching, 3 x 30 seconds, B    12. standing hip abduction, 2x10, B, UE support    13. standing heel raises, x20    14. supine clamshells,, x20, pink theraband    15. Gait with FWW, demonstrates improving stability, x150'; no LOB, fluid movement noted, nt    16. supine marches, x10, B, pink theraband    17. Educated and discussed energy conservation and appropriate rest breaks with patient as patient reports that she feels like she \"over did it\" yesterday when shopping and transitioning in and out of the car. Patient verbalizes understanding and reports that she has been \"thinking about that\" already.    20. PN due 8/25; 9/20      Therapeutic Exercise Summary: Discussed safety during HEP with patient. Patient verbalizes understanding.   Access Code: [email or web address]  URL: [email or web address]  Date: 06/29/2023  Prepared by: Margaret Buening    Exercises  - Seated Transversus Abdominis Bracing  - 1 x daily - 1 sets - 10 reps - 3 seconds hold  - Seated Hip Abduction  - 1 x daily - 1 sets - 10 reps  - Seated Hip Adduction Isometrics with Ball  - 1 x daily - 1 sets - 10 reps - 3 seconds hold  - Sit to Stand with Counter Support  - 1 x daily - 1 sets - 3 reps    Access Code: [email or web address]  URL: [email or web address]  Date: 07/11/2023  Prepared by: Margaret Buening    Exercises  - Seated Piriformis Stretch with Trunk Bend  - 1-2 x daily - 2 sets - 30 seconds hold  - Standing March with Counter Support  - 1 x daily - 1 sets - 10 reps  - Seated Heel Raise  - 1 x daily - 1 sets - 20 reps      Time-based treatments/modalities:    Physical Therapy Timed Treatment Charges  Therapeutic " exercise minutes (CPT 59212): 44 minutes      Pain rating (1-10) before treatment:  6; R knee   Pain rating (1-10) after treatment:  6; R knee    ASSESSMENT:   Response to treatment: Patient tolerates PT treatment well today. Assessed R knee as noted above secondary to patient reporting pain after putting her weight on it when making her bed. Discussed ice and elevation with patient verbalizing understanding. Patient is able to participate with exercises focusing on hip and back. At this time, patient will benefit from skilled PT to promote further improvement in strength and balance for increased safety and quality of life.     PLAN/RECOMMENDATIONS:   Plan for treatment: therapy treatment to continue next visit.  Planned interventions for next visit: continue with current treatment.

## 2023-08-22 ENCOUNTER — OFFICE VISIT (OUTPATIENT)
Dept: MEDICAL GROUP | Facility: MEDICAL CENTER | Age: 78
End: 2023-08-22
Payer: MEDICARE

## 2023-08-22 VITALS
HEART RATE: 70 BPM | SYSTOLIC BLOOD PRESSURE: 118 MMHG | BODY MASS INDEX: 37.29 KG/M2 | TEMPERATURE: 98 F | HEIGHT: 65 IN | OXYGEN SATURATION: 99 % | DIASTOLIC BLOOD PRESSURE: 78 MMHG | WEIGHT: 223.8 LBS

## 2023-08-22 DIAGNOSIS — M25.561 CHRONIC PAIN OF RIGHT KNEE: ICD-10-CM

## 2023-08-22 DIAGNOSIS — M25.551 PAIN OF RIGHT HIP: ICD-10-CM

## 2023-08-22 DIAGNOSIS — F41.8 MIXED ANXIETY DEPRESSIVE DISORDER: ICD-10-CM

## 2023-08-22 DIAGNOSIS — E66.9 OBESITY (BMI 30-39.9): ICD-10-CM

## 2023-08-22 DIAGNOSIS — G89.29 CHRONIC PAIN OF RIGHT KNEE: ICD-10-CM

## 2023-08-22 DIAGNOSIS — R09.81 NASAL CONGESTION: ICD-10-CM

## 2023-08-22 DIAGNOSIS — Z91.81 RISK FOR FALLS: ICD-10-CM

## 2023-08-22 PROCEDURE — 3074F SYST BP LT 130 MM HG: CPT

## 2023-08-22 PROCEDURE — 3078F DIAST BP <80 MM HG: CPT

## 2023-08-22 PROCEDURE — 99214 OFFICE O/P EST MOD 30 MIN: CPT

## 2023-08-22 ASSESSMENT — FIBROSIS 4 INDEX: FIB4 SCORE: 2.56

## 2023-08-22 ASSESSMENT — ENCOUNTER SYMPTOMS
PALPITATIONS: 0
SHORTNESS OF BREATH: 0
CHILLS: 0
COUGH: 0
ORTHOPNEA: 0
FEVER: 0

## 2023-08-22 ASSESSMENT — PATIENT HEALTH QUESTIONNAIRE - PHQ9
CLINICAL INTERPRETATION OF PHQ2 SCORE: 3
5. POOR APPETITE OR OVEREATING: 1 - SEVERAL DAYS
SUM OF ALL RESPONSES TO PHQ QUESTIONS 1-9: 7

## 2023-08-22 NOTE — PROGRESS NOTES
Subjective:     CC: Follow-Up (Routine follow up / knee pain right pt states that is is feeling better )      HPI:   Mimi is a 77 y.o. female who presents today for:    Hip and knee pain: she has been going to physical therapy. It has been helpful.     At risk for falls: she reports that she is still having balance problems. She states that this morning she went to get up and her head kept going backwards and she was worried about falling. She did not fall, but was able to catch herself by pushing back on the chair to stop herself from falling. She denied dizziness, or tinnitus when it happened. She does have a history of vertigo and tinnitus at baseline.  She is continue to work with physical therapy.    Anxiety/ depression: she has suffered from this since she was about 12 years old. She was on medication in the past, but has stopped it. She would like to start going to therapy again before considering medication. PHQ-9 score 7    Congestion: this has been going on for about 2 days. She does have a productive cough. She denies fever, chills. She denies recent travel. She denies exposure to anyone who has been sick. She does wear a mask in public still. She denies facial pain or pressure. She denies ear pain or pressure. She does use zytrec, recently some days. She does feel that this is helpful.     Obesity: she has been trying to walk around more, she does not get enough exercise. She tries to eat well, but finds it to be challenging. She knows that losing weight would help her knee and hip pain.       Allergies: Penicillins, Risperdal [benzoic acid-risperidone], Mendoza beans, Peach [prunus persica], Atenolol, and Chlorpheniramine     Medications:   Current Outpatient Medications:     amLODIPine (NORVASC) 10 MG Tab, Take 1 Tablet by mouth every day., Disp: 100 Tablet, Rfl: 3    carvedilol (COREG) 6.25 MG Tab, Take 1 Tablet by mouth 2 times a day with meals., Disp: 200 Tablet, Rfl: 3    allopurinol (ZYLOPRIM) 100  "MG Tab, Take 1 Tablet by mouth every day., Disp: 100 Tablet, Rfl: 3    aspirin EC (ECOTRIN) 81 MG Tablet Delayed Response, Take 81 mg by mouth every day., Disp: , Rfl:       ROS:  Review of Systems   Constitutional:  Negative for chills and fever.   Respiratory:  Negative for cough and shortness of breath.    Cardiovascular:  Negative for chest pain, palpitations, orthopnea and leg swelling.       Objective:     Exam:  /78 (BP Location: Right arm, Patient Position: Sitting, BP Cuff Size: Adult long)   Pulse 70   Temp 36.7 °C (98 °F) (Temporal)   Ht 1.651 m (5' 5\")   Wt 102 kg (223 lb 12.8 oz)   SpO2 99%   BMI 37.24 kg/m²  Body mass index is 37.24 kg/m².    Physical Exam  Constitutional:       Appearance: Normal appearance.   Eyes:      Pupils: Pupils are equal, round, and reactive to light.   Cardiovascular:      Rate and Rhythm: Normal rate and regular rhythm.      Pulses: Normal pulses.      Heart sounds: Normal heart sounds.   Pulmonary:      Effort: Pulmonary effort is normal.      Breath sounds: Normal breath sounds.   Abdominal:      General: Bowel sounds are normal.      Palpations: Abdomen is soft.   Neurological:      Mental Status: She is alert and oriented to person, place, and time.   Psychiatric:         Mood and Affect: Mood normal.         Behavior: Behavior normal.           Assessment & Plan:     Mimi a 77 y.o. female with the following -     1. Pain of right hip  2. Chronic pain of right knee  Chronic, stable  Pain has improved with physical therapy. She plans to continue this.future.   - continue physical therapy    3. Risk for falls  Chronic, stable  She does have a FWW with seat. She uses this when she is up moving. She does not normally leave her home unless she has too.   - continue physical therapay    4. Mixed anxiety depressive disorder  Chronic, unstable  Her symptoms are mostly related to her age. She fines it challenging to watch herself getting older and having decreased " independence and ability to care for herself. She has been on medication in the past but would like to avoid it at this time. She would like to see a therapist again.   - Referral to Psychology    5. Nasal congestion  Acute, uncomplicated  We discussed it may likely be due to allergies. I would like her to start taking a daily allergy pill to help with her symptoms.  We also discussed using a Lucy pot to help rinse her sinuses.    6. Obesity (BMI 30-39.9)  Chronic, stable  - TSH WITH REFLEX TO FT4; Future  - Comp Metabolic Panel; Future  - Lipid Profile; Future  - CBC WITHOUT DIFFERENTIAL; Future  -Patient identified as having weight management problem.  Appropriate orders and counseling provided      Anticipatory guidance included the following: Patient counseled about skin care, diet, supplements, smoking, drugs/alcohol use, safe sex and exercise.     Return for Annual wellness visit.    Please note that this dictation was created using voice recognition software. I have made every reasonable attempt to correct obvious errors, but I expect that there are errors of grammar and possibly content that I did not discover before finalizing the note.

## 2023-08-24 ENCOUNTER — PHYSICAL THERAPY (OUTPATIENT)
Dept: PHYSICAL THERAPY | Facility: REHABILITATION | Age: 78
End: 2023-08-24
Payer: MEDICARE

## 2023-08-24 DIAGNOSIS — G89.29 CHRONIC PAIN OF LEFT KNEE: ICD-10-CM

## 2023-08-24 DIAGNOSIS — M25.562 CHRONIC PAIN OF LEFT KNEE: ICD-10-CM

## 2023-08-24 DIAGNOSIS — M54.50 LOW BACK PAIN, UNSPECIFIED BACK PAIN LATERALITY, UNSPECIFIED CHRONICITY, UNSPECIFIED WHETHER SCIATICA PRESENT: ICD-10-CM

## 2023-08-24 DIAGNOSIS — M25.552 PAIN IN LEFT HIP: ICD-10-CM

## 2023-08-24 PROCEDURE — 97110 THERAPEUTIC EXERCISES: CPT

## 2023-08-24 NOTE — OP THERAPY PROGRESS SUMMARY
Outpatient Physical Therapy  PROGRESS SUMMARY NOTE      [facility] [place] [address]  [address]  [place] [address]  Phone:  [phone]  Fax:  [phone]    Date of Visit: 08/24/2023    Patient: Mimi Appiah  YOB: 1945  MRN: [id number]     Referring Provider: ROCIO Delacruz  [facility] [address]  [place] [address]   Referring Diagnosis Pain in left hip [M25.552];Pain in left knee [M25.562];Other chronic pain [G89.29]     Visit Diagnoses     ICD-10-CM   1. Chronic pain of left knee  M25.562    G89.29   2. Pain in left hip  M25.552   3. Low back pain, unspecified back pain laterality, unspecified chronicity, unspecified whether sciatica present  M54.50       Rehab Potential: good    Progress Report Period: 7/24/23-8/24/23    Functional Assessment Used  WOMAC Grand Total: 83.33       Objective Findings and Assessment:   Patient progression towards goals: Goals:   Short Term Goals:   1. Patient will demonstrate independent HEP to promote increased strength for improved functional activity tolerance and mobility skills.- Progressing     2. Patient will demonstrate improved B LE strength to grossly 4/5 to promote improved functional mobility skills and gait mechanics. -Progressing; B hip flexion strength remains limited, otherwise met     3. Patient will complete 5x sit<> under 30 seconds. -MET; increase goal; 30 second sit<>stand; arms less than 50% of time.     Short term goal time span:  2-4 weeks      Long Term Goals:    1. Patient will report improved WOMAC to indicate increased quality of life.-not met     2. Patient will demonstrate improved B LE strength to grossly 5/5 to promote improved functional mobility skills.- progressing     3. Patient will demonstrate improved TUG score to below 15 seconds to promote decreased risk for fall. -progressing     4. Patient will report improved pain to allow for increased mobility and ability to  participate in daily tasks.-progressing    Long term goal time span:  6-8 weeks    Objective findings and assessment details: Current objective measures:      TUG: Average: 18.1 seconds; 4 WW utilized for safety  Trial 1: 16.36 seconds  Trial 2: 19.27 seconds  Trial 3: 18.53 seconds     Strength:       Left Hip   Planes of Motion   Flexion: 3+  Abduction: 5  Adduction: 5     Right Hip   Planes of Motion   Flexion: 4  Abduction: 5  Adduction: 5     Left Knee   Flexion: 5  Extension: 5     Right Knee   Flexion: 5  Extension: 5     Left Ankle/Foot   Dorsiflexion: 5  Plantar flexion: 5     Right Ankle/Foot   Dorsiflexion: 5  Plantar flexion: 5     5x sit<>stand: 30 seconds   WOMAC Grand Total: 83.33  (83.33 on eval, 63.54 on 7/25)    ASSESSMENT:   Response to treatment: Patient tolerates PT treatments well thus far and is demonstrating improved B LE strength. She is making progress toward goals but does continue to require her 4WW for assist as she remains a fall risk. At this time, patient will benefit from continued skilled PT to promote further strength and balance for decreased pain and improved functional mobility skills.     Goals:   Short Term Goals:   1. Patient will demonstrate independent HEP to promote increased strength for improved functional activity tolerance and mobility skills     2. Patient will demonstrate improved B hip flexion strength to grossly 4+/5 to promote improved functional mobility skills and gait mechanics.      3. Patient will complete 30 second sit<>stand x8 with UE support 50% or less of the time  Short term goal time span:  4-6 weeks      Long Term Goals:    1. Patient will report improved WOMAC to indicate increased quality of life.     2. Patient will demonstrate improved B LE strength to grossly 5/5 to promote improved functional mobility skills.     3. Patient will demonstrate improved TUG score to below 15 seconds to promote decreased risk for fall.      4. Patient will report  improved pain to allow for increased mobility and ability to participate in daily tasks.  Long term goal time span:  2-4 months    Plan:   Planned therapy interventions:  Neuromuscular Re-education (CPT 66257), Manual Therapy (CPT 23763), Functional Training, Self Care (CPT 52097), Therapeutic Exercise (CPT 88218), Therapeutic Activities (CPT 82098), E Stim Unattended (CPT 43001) and Hot or Cold Pack Therapy (CPT 43663)  Frequency: 1-2x/week.  Duration in weeks:  10      Referring provider co-signature:  I have reviewed this plan of care and my co-signature certifies the need for services.     Certification Period: 08/24/2023 to 11/02/23    Physician Signature: ________________________________ Date: ______________

## 2023-08-24 NOTE — OP THERAPY DAILY TREATMENT
Outpatient Physical Therapy  DAILY TREATMENT     [address]  [address]  [place] [address]  Phone:  [phone]  Fax:  [phone]    Date: 08/24/2023    Patient: Mimi Appiah  YOB: 1945  MRN: [id number]     Time Calculation    Start time: 1516  Stop time: 1558 Time Calculation (min): 42 minutes         Chief Complaint: Hip Problem, Back Problem, and Knee Problem    Visit #: 11    SUBJECTIVE:  Patient reports that she woke up with B hip pain of 8/10. She reports she used some CBD cream and her pain seems to be improved this afternoon.     OBJECTIVE:  Current objective measures:     TUG: Average: 18.1 seconds; 4 WW utilized for safety  Trial 1: 16.36 seconds  Trial 2: 19.27 seconds  Trial 3: 18.53 seconds     Strength:       Left Hip   Planes of Motion   Flexion: 3+  Abduction: 5  Adduction: 5     Right Hip   Planes of Motion   Flexion: 4  Abduction: 5  Adduction: 5     Left Knee   Flexion: 5  Extension: 5     Right Knee   Flexion: 5  Extension: 5     Left Ankle/Foot   Dorsiflexion: 5  Plantar flexion: 5     Right Ankle/Foot   Dorsiflexion: 5  Plantar flexion: 5    5x sit<>stand: 30 seconds   WOMAC Grand Total: 83.33  (83.33 on eval, 63.54 on 7/25)      Therapeutic Exercises (CPT 20562):     1. TUG with 4WW, 18.1 seconds, 8/24    2. 5x sit<>stand, 30 seconds, 8/24    3. seated marches, x20 B, pink theraband, nt    4. seated hip abduction, x20 B, pink theraband, nt    5. seated hip adduction, x15, 3 second pillow squeezes, nt    6. seated TrA bracing, x10, 3 second holds, nt    8. NuStep, x10 minutes, level 4, 50+ SPM; nt    9. sit<>stands, x10, nt    10. standing marches, 2x20, B, UE support, plan to increase resistance on next visit    11. piriformis stretching, 3 x 30 seconds, B, nt    12. standing hip abduction, 2x15; B UE support, verbal cues for improved form; breaks as needed    13. standing heel raises, x20    14. supine clamshells,, x20,  "pink theraband, nt    15. Gait with FWW, demonstrates improving stability, x150'; no LOB, fluid movement noted, nt    16. supine marches, x10, B, pink theraband, nt    17. Educated and discussed energy conservation and appropriate rest breaks with patient as patient reports that she feels like she \"over did it\" yesterday when shopping and transitioning in and out of the car. Patient verbalizes understanding and reports that she has been \"thinking about that\" already.    20. PN due 9/24; Recert due 11/2      Therapeutic Exercise Summary: Discussed safety during HEP with patient. Patient verbalizes understanding.   Access Code: [email or web address]  URL: [email or web address]  Date: 06/29/2023  Prepared by: Margaret Buening    Exercises  - Seated Transversus Abdominis Bracing  - 1 x daily - 1 sets - 10 reps - 3 seconds hold  - Seated Hip Abduction  - 1 x daily - 1 sets - 10 reps  - Seated Hip Adduction Isometrics with Ball  - 1 x daily - 1 sets - 10 reps - 3 seconds hold  - Sit to Stand with Counter Support  - 1 x daily - 1 sets - 3 reps    Access Code: [email or web address]  URL: [email or web address]  Date: 07/11/2023  Prepared by: Margaret Buening    Exercises  - Seated Piriformis Stretch with Trunk Bend  - 1-2 x daily - 2 sets - 30 seconds hold  - Standing March with Counter Support  - 1 x daily - 1 sets - 10 reps  - Seated Heel Raise  - 1 x daily - 1 sets - 20 reps      Time-based treatments/modalities:    Physical Therapy Timed Treatment Charges  Therapeutic exercise minutes (CPT 47574): 42 minutes      Pain rating (1-10) before treatment:  5; L hip   Pain rating (1-10) after treatment:  5; L hip     Goals:   Short Term Goals:   1. Patient will demonstrate independent HEP to promote increased strength for improved functional activity tolerance and mobility skills.- Progressing     2. Patient will demonstrate improved B LE strength to grossly 4/5 to promote improved functional mobility skills and gait mechanics. -Progressing; " B hip flexion strength remains limited, otherwise met     3. Patient will complete 5x sit<> under 30 seconds. -MET; increase goal; 30 second sit<>stand; arms less than 50% of time.     Short term goal time span:  2-4 weeks      Long Term Goals:    1. Patient will report improved WOMAC to indicate increased quality of life.-not met     2. Patient will demonstrate improved B LE strength to grossly 5/5 to promote improved functional mobility skills.- progressing     3. Patient will demonstrate improved TUG score to below 15 seconds to promote decreased risk for fall. -progressing     4. Patient will report improved pain to allow for increased mobility and ability to participate in daily tasks.-progressing    Long term goal time span:  6-8 weeks    ASSESSMENT:   Response to treatment: Patient tolerates PT treatments well thus far and is demonstrating improved B LE strength. She is making progress toward goals but does continue to require her 4WW for assist as she remains a fall risk. At this time, patient will benefit from continued skilled PT to promote further strength and balance for decreased pain and improved functional mobility skills.     PLAN/RECOMMENDATIONS:   Plan for treatment: therapy treatment to continue next visit.  Planned interventions for next visit: continue with current treatment.

## 2023-08-31 ENCOUNTER — PHYSICAL THERAPY (OUTPATIENT)
Dept: PHYSICAL THERAPY | Facility: REHABILITATION | Age: 78
End: 2023-08-31
Payer: MEDICARE

## 2023-08-31 DIAGNOSIS — G89.29 CHRONIC PAIN OF LEFT KNEE: ICD-10-CM

## 2023-08-31 DIAGNOSIS — M54.50 LOW BACK PAIN, UNSPECIFIED BACK PAIN LATERALITY, UNSPECIFIED CHRONICITY, UNSPECIFIED WHETHER SCIATICA PRESENT: ICD-10-CM

## 2023-08-31 DIAGNOSIS — M25.562 CHRONIC PAIN OF LEFT KNEE: ICD-10-CM

## 2023-08-31 DIAGNOSIS — M25.552 PAIN IN LEFT HIP: ICD-10-CM

## 2023-08-31 PROCEDURE — 97110 THERAPEUTIC EXERCISES: CPT

## 2023-08-31 NOTE — OP THERAPY DAILY TREATMENT
"  Outpatient Physical Therapy  DAILY TREATMENT     [facility] Physical [address].  [address]  [place] [address]  Phone:  [phone]  Fax:  [phone]    Date: 08/31/2023    Patient: Mimi Appiah  YOB: 1945  MRN: [id number]     Time Calculation    Start time: 1346  Stop time: 1430 Time Calculation (min): 44 minutes         Chief Complaint: Back Problem, Hip Problem, and Knee Problem    Visit #: 12    SUBJECTIVE:  Patient reports that her low back is bothering her today. She reports that she has been working on cleaning out her apartment quite a bit lately. Patient reports her previous knee pain appears improved.     OBJECTIVE:  Current objective measures:           Therapeutic Exercises (CPT 94166):     1. TUG with 4WW, 18.1 seconds, 8/24    2. 5x sit<>stand, 30 seconds, 8/24    3. supine marches, 2x15 B, pink theraband    4. supine hip abduction, 2x20 B, pink theraband    5. supine hip adduction, x20, 3 second pillow squeezes    6. supine TrA bracing, x15, 3 second holds    7. Hamstring curls with swiss ball, x2 minute    8. supine bridges, 3 x5    9. sit<>stands, x8; verbal cues for hand placement, lower surface utilized, no LOB    10. LTR, x2 minutes    11. piriformis stretching, 3 x 30 seconds, B, nt    12. standing hip abduction, 2x15; B UE support, verbal cues for improved form; breaks as needed, nt    13. standing heel raises, x20, nt    14. supine clamshells,, x20, pink theraband, nt    15. Gait with FWW, demonstrates improving stability, x150'; no LOB, fluid movement noted, nt    16. supine marches, x10, B, pink theraband, nt    17. Educated and discussed energy conservation and appropriate rest breaks with patient as patient reports that she feels like she \"over did it\" yesterday when shopping and transitioning in and out of the car. Patient verbalizes understanding and reports that she has been \"thinking about that\" already.    20. PN due 9/24; Recert due " 11/2      Therapeutic Exercise Summary: Discussed safety during HEP with patient. Patient verbalizes understanding.   Access Code: [email or web address]  URL: [email or web address]  Date: 06/29/2023  Prepared by: Margaret Buening    Exercises  - Seated Transversus Abdominis Bracing  - 1 x daily - 1 sets - 10 reps - 3 seconds hold  - Seated Hip Abduction  - 1 x daily - 1 sets - 10 reps  - Seated Hip Adduction Isometrics with Ball  - 1 x daily - 1 sets - 10 reps - 3 seconds hold  - Sit to Stand with Counter Support  - 1 x daily - 1 sets - 3 reps    Access Code: [email or web address]  URL: [email or web address]  Date: 07/11/2023  Prepared by: Margaret Buening    Exercises  - Seated Piriformis Stretch with Trunk Bend  - 1-2 x daily - 2 sets - 30 seconds hold  - Standing March with Counter Support  - 1 x daily - 1 sets - 10 reps  - Seated Heel Raise  - 1 x daily - 1 sets - 20 reps      Time-based treatments/modalities:    Physical Therapy Timed Treatment Charges  Therapeutic exercise minutes (CPT 37498): 44 minutes      Pain rating (1-10) before treatment:  6; low back   Pain rating (1-10) after treatment:  pain not reported on exit, no apparent distress     ASSESSMENT:   Response to treatment: Patient tolerates PT treatment well today. Continued with core stability exercises to promote decreased back pain and improved mobility. Patient tolerates exercises well and will continue to benefit from skilled PT for further functional mobility improvement.      PLAN/RECOMMENDATIONS:   Plan for treatment: therapy treatment to continue next visit.  Planned interventions for next visit: continue with current treatment.

## 2023-09-01 ENCOUNTER — APPOINTMENT (OUTPATIENT)
Dept: PHYSICAL THERAPY | Facility: REHABILITATION | Age: 78
End: 2023-09-01
Payer: MEDICARE

## 2023-09-11 ENCOUNTER — HOSPITAL ENCOUNTER (OUTPATIENT)
Dept: LAB | Facility: MEDICAL CENTER | Age: 78
End: 2023-09-11
Payer: MEDICARE

## 2023-09-11 ENCOUNTER — OFFICE VISIT (OUTPATIENT)
Dept: MEDICAL GROUP | Facility: MEDICAL CENTER | Age: 78
End: 2023-09-11
Payer: MEDICARE

## 2023-09-11 VITALS
HEART RATE: 68 BPM | SYSTOLIC BLOOD PRESSURE: 126 MMHG | DIASTOLIC BLOOD PRESSURE: 78 MMHG | HEIGHT: 65 IN | WEIGHT: 216 LBS | BODY MASS INDEX: 35.99 KG/M2 | OXYGEN SATURATION: 97 % | TEMPERATURE: 97.6 F | RESPIRATION RATE: 16 BRPM

## 2023-09-11 DIAGNOSIS — J06.9 ACUTE URI: ICD-10-CM

## 2023-09-11 DIAGNOSIS — R05.1 ACUTE COUGH: ICD-10-CM

## 2023-09-11 DIAGNOSIS — E66.9 OBESITY (BMI 30-39.9): ICD-10-CM

## 2023-09-11 LAB
ALBUMIN SERPL BCP-MCNC: 3.6 G/DL (ref 3.2–4.9)
ALBUMIN/GLOB SERPL: 1 G/DL
ALP SERPL-CCNC: 93 U/L (ref 30–99)
ALT SERPL-CCNC: <5 U/L (ref 2–50)
ANION GAP SERPL CALC-SCNC: 12 MMOL/L (ref 7–16)
AST SERPL-CCNC: 11 U/L (ref 12–45)
BILIRUB SERPL-MCNC: 0.4 MG/DL (ref 0.1–1.5)
BUN SERPL-MCNC: 27 MG/DL (ref 8–22)
CALCIUM ALBUM COR SERPL-MCNC: 9.7 MG/DL (ref 8.5–10.5)
CALCIUM SERPL-MCNC: 9.4 MG/DL (ref 8.5–10.5)
CHLORIDE SERPL-SCNC: 107 MMOL/L (ref 96–112)
CHOLEST SERPL-MCNC: 193 MG/DL (ref 100–199)
CO2 SERPL-SCNC: 19 MMOL/L (ref 20–33)
CREAT SERPL-MCNC: 1.71 MG/DL (ref 0.5–1.4)
ERYTHROCYTE [DISTWIDTH] IN BLOOD BY AUTOMATED COUNT: 47.2 FL (ref 35.9–50)
FASTING STATUS PATIENT QL REPORTED: NORMAL
GFR SERPLBLD CREATININE-BSD FMLA CKD-EPI: 31 ML/MIN/1.73 M 2
GLOBULIN SER CALC-MCNC: 3.7 G/DL (ref 1.9–3.5)
GLUCOSE SERPL-MCNC: 71 MG/DL (ref 65–99)
HCT VFR BLD AUTO: 40 % (ref 37–47)
HDLC SERPL-MCNC: 40 MG/DL
HGB BLD-MCNC: 12.9 G/DL (ref 12–16)
LDLC SERPL CALC-MCNC: 135 MG/DL
MCH RBC QN AUTO: 31.3 PG (ref 27–33)
MCHC RBC AUTO-ENTMCNC: 32.3 G/DL (ref 32.2–35.5)
MCV RBC AUTO: 97.1 FL (ref 81.4–97.8)
PLATELET # BLD AUTO: 219 K/UL (ref 164–446)
PMV BLD AUTO: 12 FL (ref 9–12.9)
POTASSIUM SERPL-SCNC: 4.2 MMOL/L (ref 3.6–5.5)
PROT SERPL-MCNC: 7.3 G/DL (ref 6–8.2)
RBC # BLD AUTO: 4.12 M/UL (ref 4.2–5.4)
SODIUM SERPL-SCNC: 138 MMOL/L (ref 135–145)
TRIGL SERPL-MCNC: 92 MG/DL (ref 0–149)
TSH SERPL DL<=0.005 MIU/L-ACNC: 0.85 UIU/ML (ref 0.38–5.33)
WBC # BLD AUTO: 8 K/UL (ref 4.8–10.8)

## 2023-09-11 PROCEDURE — 84443 ASSAY THYROID STIM HORMONE: CPT

## 2023-09-11 PROCEDURE — 80053 COMPREHEN METABOLIC PANEL: CPT

## 2023-09-11 PROCEDURE — 36415 COLL VENOUS BLD VENIPUNCTURE: CPT

## 2023-09-11 PROCEDURE — 99213 OFFICE O/P EST LOW 20 MIN: CPT

## 2023-09-11 PROCEDURE — 3078F DIAST BP <80 MM HG: CPT

## 2023-09-11 PROCEDURE — 85027 COMPLETE CBC AUTOMATED: CPT

## 2023-09-11 PROCEDURE — 3074F SYST BP LT 130 MM HG: CPT

## 2023-09-11 PROCEDURE — 80061 LIPID PANEL: CPT

## 2023-09-11 RX ORDER — LEVOFLOXACIN 750 MG/1
750 TABLET, FILM COATED ORAL DAILY
Qty: 5 TABLET | Refills: 0 | Status: SHIPPED | OUTPATIENT
Start: 2023-09-11 | End: 2023-09-16

## 2023-09-11 ASSESSMENT — FIBROSIS 4 INDEX: FIB4 SCORE: 2.56

## 2023-09-11 NOTE — PROGRESS NOTES
No chief complaint on file.       HPI: Patient is a 77 y.o. female complaining of 3 weeks of illness including: chills, dry cough, shortness of breath, nasal congestion, rhinorrhea.   Mucus is: thick.  Similarly ill exposures: no.  Treatments tried: none  She  reports that she has been smoking cigarettes. She has never used smokeless tobacco..     ROS:  No fever, nausea, changes in bowel movements or skin rash.      I reviewed the patient's medications, allergies and medical history:  Current Outpatient Medications   Medication Sig Dispense Refill    levoFLOXacin (LEVAQUIN) 750 MG tablet Take 1 Tablet by mouth every day for 5 days. 5 Tablet 0    amLODIPine (NORVASC) 10 MG Tab Take 1 Tablet by mouth every day. 100 Tablet 3    carvedilol (COREG) 6.25 MG Tab Take 1 Tablet by mouth 2 times a day with meals. 200 Tablet 3    allopurinol (ZYLOPRIM) 100 MG Tab Take 1 Tablet by mouth every day. 100 Tablet 3    aspirin EC (ECOTRIN) 81 MG Tablet Delayed Response Take 81 mg by mouth every day.       No current facility-administered medications for this visit.     Penicillins, Risperdal [benzoic acid-risperidone], Mendoza beans, Peach [prunus persica], Atenolol, and Chlorpheniramine  Past Medical History:   Diagnosis Date    Abnormal Pap smear     Abnormal Pap smear 04/11/2012    1970 precancerous lesions treated with cyro    Abnormality of hormone 08/14/2020    Allergic rhinitis     BMI 40.0-44.9, adult (formerly [facility]) 07/01/2022    Congestion of nasal sinus 1/7/2020    Encounter for screening for malignant neoplasm of breast 04/11/2012    Preventative Health (Women): Tetanus - ? Pneumovax - 2005  Flu shot - 10/12 Stool - 2005  Pap Smear - 2009, precancer of cervix 1970 - treated with cryo.  Decline further Mammogram - none Breast Exam - none Dexa Scan - none Colonscopy - 2005, was done in [facility].  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  Eyes: PERRL, conjunctiva slightly injected, no eye discharge.  Ears: Normal pinnae,TM's normal bilaterally.  Nares: Patent with thin mucus.  Sinuses: non tender over maxillary / frontal sinuses.  Throat: Erythematous injection without exudate.   Neck: Supple, with no adenopathy.  Lungs: Clear to auscultation bilaterally, no wheeze, crackles or rhonchi.   Heart: Regular rate without murmur.  Skin: Warm and dry without rash.     ASSESSMENT:   1. Acute URI    2. Acute cough          PLAN:  1. Educated patient that majority of upper respiratory infections are viral and do not need antibiotics. As symptoms have been worsening over the last few week, will treat with antibiotics.  2. Twice daily use of nasal saline rinse or Neti-Pot.  3. OTC anti-pyretics and decongestants as needed.  4. Follow-up in office or urgent care for worsening symptoms, difficulty breathing, lack of expected recovery, or should new symptoms or problems arise.    "

## 2023-09-14 ENCOUNTER — TELEPHONE (OUTPATIENT)
Dept: MEDICAL GROUP | Facility: MEDICAL CENTER | Age: 78
End: 2023-09-14
Payer: MEDICARE

## 2023-09-14 ENCOUNTER — APPOINTMENT (OUTPATIENT)
Dept: RADIOLOGY | Facility: MEDICAL CENTER | Age: 78
DRG: 682 | End: 2023-09-14
Attending: EMERGENCY MEDICINE
Payer: MEDICARE

## 2023-09-14 ENCOUNTER — HOSPITAL ENCOUNTER (EMERGENCY)
Facility: MEDICAL CENTER | Age: 78
DRG: 682 | End: 2023-09-14
Attending: EMERGENCY MEDICINE
Payer: MEDICARE

## 2023-09-14 VITALS
BODY MASS INDEX: 36.44 KG/M2 | TEMPERATURE: 96.5 F | OXYGEN SATURATION: 96 % | DIASTOLIC BLOOD PRESSURE: 96 MMHG | SYSTOLIC BLOOD PRESSURE: 163 MMHG | RESPIRATION RATE: 18 BRPM | HEIGHT: 65 IN | HEART RATE: 85 BPM | WEIGHT: 218.7 LBS

## 2023-09-14 DIAGNOSIS — U07.1 COVID-19: ICD-10-CM

## 2023-09-14 DIAGNOSIS — I71.43 INFRARENAL ABDOMINAL AORTIC ANEURYSM (AAA) WITHOUT RUPTURE (HCC): ICD-10-CM

## 2023-09-14 LAB
ALBUMIN SERPL BCP-MCNC: 3.8 G/DL (ref 3.2–4.9)
ALBUMIN/GLOB SERPL: 1 G/DL
ALP SERPL-CCNC: 101 U/L (ref 30–99)
ALT SERPL-CCNC: <5 U/L (ref 2–50)
ANION GAP SERPL CALC-SCNC: 15 MMOL/L (ref 7–16)
AST SERPL-CCNC: 15 U/L (ref 12–45)
BASOPHILS # BLD AUTO: 0.6 % (ref 0–1.8)
BASOPHILS # BLD: 0.03 K/UL (ref 0–0.12)
BILIRUB SERPL-MCNC: 0.4 MG/DL (ref 0.1–1.5)
BUN SERPL-MCNC: 22 MG/DL (ref 8–22)
CALCIUM ALBUM COR SERPL-MCNC: 9.9 MG/DL (ref 8.5–10.5)
CALCIUM SERPL-MCNC: 9.7 MG/DL (ref 8.5–10.5)
CHLORIDE SERPL-SCNC: 98 MMOL/L (ref 96–112)
CO2 SERPL-SCNC: 18 MMOL/L (ref 20–33)
CREAT SERPL-MCNC: 1.78 MG/DL (ref 0.5–1.4)
EOSINOPHIL # BLD AUTO: 0.04 K/UL (ref 0–0.51)
EOSINOPHIL NFR BLD: 0.7 % (ref 0–6.9)
ERYTHROCYTE [DISTWIDTH] IN BLOOD BY AUTOMATED COUNT: 44.1 FL (ref 35.9–50)
FLUAV RNA SPEC QL NAA+PROBE: NEGATIVE
FLUBV RNA SPEC QL NAA+PROBE: NEGATIVE
GFR SERPLBLD CREATININE-BSD FMLA CKD-EPI: 29 ML/MIN/1.73 M 2
GLOBULIN SER CALC-MCNC: 3.7 G/DL (ref 1.9–3.5)
GLUCOSE SERPL-MCNC: 80 MG/DL (ref 65–99)
HCT VFR BLD AUTO: 39.2 % (ref 37–47)
HGB BLD-MCNC: 13.1 G/DL (ref 12–16)
IMM GRANULOCYTES # BLD AUTO: 0.03 K/UL (ref 0–0.11)
IMM GRANULOCYTES NFR BLD AUTO: 0.6 % (ref 0–0.9)
LYMPHOCYTES # BLD AUTO: 0.89 K/UL (ref 1–4.8)
LYMPHOCYTES NFR BLD: 16.5 % (ref 22–41)
MCH RBC QN AUTO: 31.3 PG (ref 27–33)
MCHC RBC AUTO-ENTMCNC: 33.4 G/DL (ref 32.2–35.5)
MCV RBC AUTO: 93.6 FL (ref 81.4–97.8)
MONOCYTES # BLD AUTO: 0.83 K/UL (ref 0–0.85)
MONOCYTES NFR BLD AUTO: 15.3 % (ref 0–13.4)
NEUTROPHILS # BLD AUTO: 3.59 K/UL (ref 1.82–7.42)
NEUTROPHILS NFR BLD: 66.3 % (ref 44–72)
NRBC # BLD AUTO: 0 K/UL
NRBC BLD-RTO: 0 /100 WBC (ref 0–0.2)
PLATELET # BLD AUTO: 199 K/UL (ref 164–446)
PMV BLD AUTO: 11.3 FL (ref 9–12.9)
POTASSIUM SERPL-SCNC: 3.9 MMOL/L (ref 3.6–5.5)
PROT SERPL-MCNC: 7.5 G/DL (ref 6–8.2)
RBC # BLD AUTO: 4.19 M/UL (ref 4.2–5.4)
RSV RNA SPEC QL NAA+PROBE: NEGATIVE
SARS-COV-2 RNA RESP QL NAA+PROBE: DETECTED
SODIUM SERPL-SCNC: 131 MMOL/L (ref 135–145)
SPECIMEN SOURCE: ABNORMAL
WBC # BLD AUTO: 5.4 K/UL (ref 4.8–10.8)

## 2023-09-14 PROCEDURE — 80053 COMPREHEN METABOLIC PANEL: CPT

## 2023-09-14 PROCEDURE — 700105 HCHG RX REV CODE 258: Mod: UD | Performed by: EMERGENCY MEDICINE

## 2023-09-14 PROCEDURE — 36415 COLL VENOUS BLD VENIPUNCTURE: CPT

## 2023-09-14 PROCEDURE — 0241U HCHG SARS-COV-2 COVID-19 NFCT DS RESP RNA 4 TRGT MIC: CPT

## 2023-09-14 PROCEDURE — 99284 EMERGENCY DEPT VISIT MOD MDM: CPT

## 2023-09-14 PROCEDURE — 85025 COMPLETE CBC W/AUTO DIFF WBC: CPT

## 2023-09-14 PROCEDURE — 74176 CT ABD & PELVIS W/O CONTRAST: CPT

## 2023-09-14 PROCEDURE — C9803 HOPD COVID-19 SPEC COLLECT: HCPCS | Performed by: EMERGENCY MEDICINE

## 2023-09-14 RX ORDER — SODIUM CHLORIDE 9 MG/ML
1000 INJECTION, SOLUTION INTRAVENOUS ONCE
Status: COMPLETED | OUTPATIENT
Start: 2023-09-14 | End: 2023-09-14

## 2023-09-14 RX ADMIN — SODIUM CHLORIDE 1000 ML: 9 INJECTION, SOLUTION INTRAVENOUS at 20:12

## 2023-09-14 ASSESSMENT — FIBROSIS 4 INDEX: FIB4 SCORE: 1.8

## 2023-09-14 NOTE — TELEPHONE ENCOUNTER
----- Message from ROCIO Delacruz sent at 9/12/2023  6:35 AM PDT -----  Please call Mimi and tell them I reviewed her results      It looks like your blood levels are normal, so are your liver, and thyroid. Your cholesterol is slightly elevated. Try to increase lean meat in your diet, like fish, turkey, lean beef, and chicken. Cut back on processed food and increase activity to help lower this.  Her kidney function is also slightly decreased.  Try to have her increase her water intake.      Have them make an appt if they have any questions.

## 2023-09-14 NOTE — TELEPHONE ENCOUNTER
Phone Number Called: Mimi Arevalo Tutson    Call outcome: Spoke to patient regarding message below.

## 2023-09-15 NOTE — ED NOTES
Assist RN: Provided pt with written and verbal instructions regarding follow-up, activity, and RX. Pt using MTM to transport home, instructions provided and pt verbalized understanding. All questions answered and patient verbalized understanding. Pt ambulated out of unit with steady gait.

## 2023-09-15 NOTE — ED TRIAGE NOTES
"Chief Complaint   Patient presents with    Rectal Pain     Pt having rectal pain x 2 days. +Bloating. +Decreased appetite. Denies bleeding. Last BM 3 days ago.     Pt on abx given by PCP for URI. Pt amb to triage with walker. Pt educated to inform staff of any changes.     BP (!) 162/93   Pulse 82   Temp 36.1 °C (97 °F) (Temporal)   Resp 16   Ht 1.651 m (5' 5\")   Wt 99.2 kg (218 lb 11.1 oz)   SpO2 97%   BMI 36.39 kg/m²     "

## 2023-09-15 NOTE — DISCHARGE PLANNING
ER MANI asked to assist with transportation of Pt home.  SW called [place] and was informed that the Pt does not have transportation benefits from [employer].  MANI then met with the Pt who shared that her DTR usually assist her however she is currently out of town.  She shared that she did not have means with her to pay for a cab.  SW assist with a cab voucher to Pt's list home address per Pt request ([phone]).

## 2023-09-15 NOTE — ED PROVIDER NOTES
ED Provider Note    CHIEF COMPLAINT  Chief Complaint   Patient presents with    Rectal Pain     Pt having rectal pain x 2 days. +Bloating. +Decreased appetite. Denies bleeding. Last BM 3 days ago.       EXTERNAL RECORDS REVIEWED  Reviewed medication list outpatient laboratory studies    HPI/ROS  LIMITATION TO HISTORY   None  OUTSIDE HISTORIAN(S):  None    Mimi Appiah is a 77 y.o. for evaluation of body aches runny nose not feeling well some crampy intermittent abdominal pain with mild constipation.  She also reports symptoms including low-grade fever general malaise runny nose and cough.  Patient has an extensive medical history as listed below.  She reports no thoracoabdominal surgeries other than tubal ligation.  Denies urinary symptoms such as dysuria or hematuria.  She has no report of hemorrhoids or rectal bleeding.    PAST MEDICAL HISTORY   has a past medical history of Abnormal Pap smear, Abnormal Pap smear (04/11/2012), Abnormality of hormone (08/14/2020), Allergic rhinitis, BMI 40.0-44.9, adult (HCC) (07/01/2022), Congestion of nasal sinus (1/7/2020), Encounter for screening for malignant neoplasm of breast (04/11/2012), Eye tearing (10/11/2018), Finger deformity (12/11/2017), Hemorrhoids, HEMORRHOIDS (04/11/2012), Hypertension, LBP (low back pain), Muscle cramps (4/17/2017), Night sweats (7/14/2020), Obesity (11/05/2014), Other specified disorders of bone density and structure, multiple sites (10/16/2019), Sequelae, 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    SURGICAL HISTORY   has a past surgical history that includes tubal coagulation laparoscopic bilateral.    FAMILY HISTORY  Family History   Problem Relation Age of Onset    Stroke Father     Alcohol abuse Father     Cancer Sister     Cancer Brother        SOCIAL HISTORY  Social History     Tobacco Use    Smoking status: Some  "Days     Types: Cigarettes    Smokeless tobacco: Never   Vaping Use    Vaping Use: Never used   Substance and Sexual Activity    Alcohol use: No    Drug use: No    Sexual activity: Not Currently       CURRENT MEDICATIONS  Home Medications       Reviewed by Lissy Haro R.N. (Registered Nurse) on 09/14/23 at 1714  Med List Status: Not Addressed     Medication Last Dose Status   allopurinol (ZYLOPRIM) 100 MG Tab  Active   amLODIPine (NORVASC) 10 MG Tab  Active   aspirin EC (ECOTRIN) 81 MG Tablet Delayed Response  Active   carvedilol (COREG) 6.25 MG Tab  Active   levoFLOXacin (LEVAQUIN) 750 MG tablet  Active                    ALLERGIES  Allergies   Allergen Reactions    Penicillins Swelling     Other reaction(s): head and face swelling    Risperdal [Benzoic Acid-Risperidone]      Other reaction(s): bad reaction all over body    Lima Beans Swelling     Pt gets swollen lips with butter and lima beans.      Peach [Prunus Persica] Nausea     Pt gets nauseous with peaches    Atenolol Swelling     Only at doses above 50mg    Chlorpheniramine        PHYSICAL EXAM  VITAL SIGNS: BP (!) 187/90   Pulse 81   Temp 36.1 °C (97 °F) (Temporal)   Resp 16   Ht 1.651 m (5' 5\")   Wt 99.2 kg (218 lb 11.1 oz)   SpO2 98%   BMI 36.39 kg/m²    Pulse ox interpretation: I interpret this pulse ox as normal.  Constitutional: Alert and oriented x 3, no acute distress  HEENT: Atraumatic normocephalic, pupils are equal round reactive to light extraocular movements are intact. The nares is clear, external ears are normal, mouth shows moist mucous membranes normal dentition for age  Neck: Supple, no JVD no tracheal deviation  Cardiovascular: Regular rate and rhythm no murmur rub or gallop 2+ pulses peripherally x4  Thorax & Lungs: No respiratory distress, no wheezes rales or rhonchi, No chest tenderness.   GI: Soft nontender nondistended positive bowel sounds, no peritoneal signs.  Mass no rebound or guarding rectal exam demonstrates no " bleeding or masses  Skin: Warm dry no acute rash or lesion  Musculoskeletal: Moving all extremities with full range and 5 of 5 strength no acute  deformity  Neurologic: Cranial nerves III through XII are grossly intact no sensory deficit no cerebellar dysfunction   Psychiatric: Appropriate affect for situation at this time          DIAGNOSTIC STUDIES / PROCEDURES      LABS  Results for orders placed or performed during the hospital encounter of 09/14/23   CBC WITH DIFFERENTIAL   Result Value Ref Range    WBC 5.4 4.8 - 10.8 K/uL    RBC 4.19 (L) 4.20 - 5.40 M/uL    Hemoglobin 13.1 12.0 - 16.0 g/dL    Hematocrit 39.2 37.0 - 47.0 %    MCV 93.6 81.4 - 97.8 fL    MCH 31.3 27.0 - 33.0 pg    MCHC 33.4 32.2 - 35.5 g/dL    RDW 44.1 35.9 - 50.0 fL    Platelet Count 199 164 - 446 K/uL    MPV 11.3 9.0 - 12.9 fL    Neutrophils-Polys 66.30 44.00 - 72.00 %    Lymphocytes 16.50 (L) 22.00 - 41.00 %    Monocytes 15.30 (H) 0.00 - 13.40 %    Eosinophils 0.70 0.00 - 6.90 %    Basophils 0.60 0.00 - 1.80 %    Immature Granulocytes 0.60 0.00 - 0.90 %    Nucleated RBC 0.00 0.00 - 0.20 /100 WBC    Neutrophils (Absolute) 3.59 1.82 - 7.42 K/uL    Lymphs (Absolute) 0.89 (L) 1.00 - 4.80 K/uL    Monos (Absolute) 0.83 0.00 - 0.85 K/uL    Eos (Absolute) 0.04 0.00 - 0.51 K/uL    Baso (Absolute) 0.03 0.00 - 0.12 K/uL    Immature Granulocytes (abs) 0.03 0.00 - 0.11 K/uL    NRBC (Absolute) 0.00 K/uL   Comp Metabolic Panel   Result Value Ref Range    Sodium 131 (L) 135 - 145 mmol/L    Potassium 3.9 3.6 - 5.5 mmol/L    Chloride 98 96 - 112 mmol/L    Co2 18 (L) 20 - 33 mmol/L    Anion Gap 15.0 7.0 - 16.0    Glucose 80 65 - 99 mg/dL    Bun 22 8 - 22 mg/dL    Creatinine 1.78 (H) 0.50 - 1.40 mg/dL    Calcium 9.7 8.5 - 10.5 mg/dL    Correct Calcium 9.9 8.5 - 10.5 mg/dL    AST(SGOT) 15 12 - 45 U/L    ALT(SGPT) <5 2 - 50 U/L    Alkaline Phosphatase 101 (H) 30 - 99 U/L    Total Bilirubin 0.4 0.1 - 1.5 mg/dL    Albumin 3.8 3.2 - 4.9 g/dL    Total Protein 7.5 6.0 -  8.2 g/dL    Globulin 3.7 (H) 1.9 - 3.5 g/dL    A-G Ratio 1.0 g/dL   CoV-2, FLU A/B, and RSV by PCR (2-4 Hours CEPHEID) : Collect NP swab in VTM    Specimen: Nasal; Respirate   Result Value Ref Range    Influenza virus A RNA Negative Negative    Influenza virus B, PCR Negative Negative    RSV, PCR Negative Negative    SARS-CoV-2 by PCR DETECTED (AA)     SARS-CoV-2 Source NP Swab    ESTIMATED GFR   Result Value Ref Range    GFR (CKD-EPI) 29 (A) >60 mL/min/1.73 m 2         RADIOLOGY  I have independently interpreted the diagnostic imaging associated with this visit and am waiting the final reading from the radiologist.   My preliminary interpretation is as follows: No acute surgical process abdominal aortic aneurysms noted  Radiologist interpretation:   CT-ABDOMEN-PELVIS W/O   Final Result         1.  Changes of porcelain gallbladder.   2.  Cholelithiasis   3.  4.1 cm distal thoracic aortic aneurysm. 2 infrarenal abdominal aortic aneurysms measuring 4.4 and 6.2 cm. Radiographic follow-up recommended as clinically appropriate.   4.  2.2 cm fat-containing umbilical hernia   5.  Diverticulosis   6.  Atherosclerosis          COURSE & MEDICAL DECISION MAKING    ED Observation Status? Yes; I am placing the patient in to an observation status due to a diagnostic uncertainty as well as therapeutic intensity. Patient placed in observation status at 600 PM, 9/14/2023.     Observation plan is as follows: Establish IV administer IV fluids perform serial abdominal exams rectal exam laboratory studies and imaging studies    Upon Reevaluation, the patient's condition has: Improved; and will be discharged.    Patient discharged from ED Observation status at 2100 (Time) 9/14 (Date).     INITIAL ASSESSMENT, COURSE AND PLAN  Care Narrative:     This is a very pleasant 77-year-old female presents here with fatigue mild headache runny nose cough some intermittent abdominal pain and constipation.  Patient had reassuring vital signs.   Specifically she did not have any high fever hypoxia tachycardia.  Her blood pressure was moderately elevated but she had no severe headache strokelike symptoms or chest pain.  The patient had an extensive evaluation here today.  Her lungs are clear and she tested positive for COVID.  She had a normal CBC but did have evidence of elevated creatinine which is essentially her baseline with no significant change in GFR.  Most concerning is that her CT scan of the abdomen pelvis had several findings.  Most notably she has a thoracic aortic ascending aneurysm without rupture or leak as well as infrarenal abdominal aortic aneurysms for which the patient did not have a previous known diagnosis of.  She also a porcelain gallbladder.  She had no leukocytosis or elevation of her LFTs.  I performed serial abdominal exams she never had any abdominal pain on exam and rectal exam was normal.  She specifically did not have any peritoneal signs right upper quadrant discomfort.  I had a long discussion with the patient.  I also consulted with the pharmacist regarding her COVID.  She does qualify for Paxlovid therapy however at renal adjusted doses.  I had a long discussion regarding her incidental but concerning finding of abdominal aortic aneurysm.  I considered consulting vascular surgery but the patient herself indicated that if she would require surgery she would need to think about it and talk to her family and is considering not obtaining consultation.  I did discuss plan of care with the patient and strongly recommended following up with vascular surgery.  I will tied resources for referral to vascular surgery and advised her to return as needed if she develops worsening abdominal pain.  I doubt her transient pain is caused by her abdominal aortic aneurysms as there is no sign of leak or blood loss and this is an incidental finding.  She would like to pursue this in a graduated fashion and did not want admission for further  evaluation of this aneurysm at this time.    HYDRATION: Based on the patient's presentation of Dehydration the patient was given IV fluids. IV Hydration was used because oral hydration was not adequate alone. Upon recheck following hydration, the patient was improved.      ADDITIONAL PROBLEM LIST    DISPOSITION AND DISCUSSIONS  I have discussed management of the patient with the following physicians and CHEN's: None    Discussion of management with other QHP or appropriate source(s): Discussed with ER pharmacist    Escalation of care considered, and ultimately not performed:acute inpatient care management, however at this time, the patient is most appropriate for outpatient management    Barriers to care at this time, including but not limited to: None    Decision tools and prescription drugs considered including, but not limited to: Patient will be prescribed oral antivirals    FINAL DIAGNOSIS  1. COVID-19  Nirmatrelvir&Ritonavir 300/100 20 x 150 MG & 10 x 100MG Tablet Therapy Pack      2. Infrarenal abdominal aortic aneurysm (AAA) without rupture (HCC)  Referral to Vascular Surgery               Electronically signed by: Stephan Hicks M.D., 9/14/2023 6:35 PM

## 2023-09-17 ENCOUNTER — APPOINTMENT (OUTPATIENT)
Dept: RADIOLOGY | Facility: MEDICAL CENTER | Age: 78
DRG: 682 | End: 2023-09-17
Attending: EMERGENCY MEDICINE
Payer: MEDICARE

## 2023-09-17 ENCOUNTER — HOSPITAL ENCOUNTER (INPATIENT)
Facility: MEDICAL CENTER | Age: 78
LOS: 3 days | DRG: 682 | End: 2023-09-20
Attending: EMERGENCY MEDICINE | Admitting: HOSPITALIST
Payer: MEDICARE

## 2023-09-17 DIAGNOSIS — R29.898 WEAKNESS OF BOTH LOWER EXTREMITIES: ICD-10-CM

## 2023-09-17 DIAGNOSIS — E87.1 HYPONATREMIA: ICD-10-CM

## 2023-09-17 DIAGNOSIS — N17.9 AKI (ACUTE KIDNEY INJURY) (HCC): ICD-10-CM

## 2023-09-17 DIAGNOSIS — I95.1 ORTHOSTATIC HYPOTENSION: ICD-10-CM

## 2023-09-17 DIAGNOSIS — K92.2 GASTROINTESTINAL HEMORRHAGE, UNSPECIFIED GASTROINTESTINAL HEMORRHAGE TYPE: ICD-10-CM

## 2023-09-17 DIAGNOSIS — E66.01 MORBID OBESITY WITH BMI OF 40.0-44.9, ADULT (HCC): ICD-10-CM

## 2023-09-17 DIAGNOSIS — U07.1 COVID-19: ICD-10-CM

## 2023-09-17 DIAGNOSIS — G31.9 DEGENERATIVE DISEASE OF NERVOUS SYSTEM, UNSPECIFIED (HCC): ICD-10-CM

## 2023-09-17 DIAGNOSIS — R79.89 ELEVATED D-DIMER: ICD-10-CM

## 2023-09-17 DIAGNOSIS — I95.9 HYPOTENSION, UNSPECIFIED HYPOTENSION TYPE: ICD-10-CM

## 2023-09-17 DIAGNOSIS — E55.9 VITAMIN D3 DEFICIENCY: ICD-10-CM

## 2023-09-17 DIAGNOSIS — I10 PRIMARY HYPERTENSION: ICD-10-CM

## 2023-09-17 DIAGNOSIS — Z91.81 RISK FOR FALLS: ICD-10-CM

## 2023-09-17 DIAGNOSIS — M25.551 PAIN OF RIGHT HIP: ICD-10-CM

## 2023-09-17 PROBLEM — E87.20 LACTIC ACIDOSIS: Status: ACTIVE | Noted: 2023-09-17

## 2023-09-17 PROBLEM — K62.5 RECTAL BLEEDING: Status: ACTIVE | Noted: 2023-09-17

## 2023-09-17 LAB
ABO + RH BLD: NORMAL
ABO GROUP BLD: NORMAL
ALBUMIN SERPL BCP-MCNC: 3.5 G/DL (ref 3.2–4.9)
ALBUMIN/GLOB SERPL: 0.9 G/DL
ALP SERPL-CCNC: 92 U/L (ref 30–99)
ALT SERPL-CCNC: 8 U/L (ref 2–50)
ANION GAP SERPL CALC-SCNC: 18 MMOL/L (ref 7–16)
APTT PPP: 24.2 SEC (ref 24.7–36)
AST SERPL-CCNC: 28 U/L (ref 12–45)
BASOPHILS # BLD AUTO: 0.3 % (ref 0–1.8)
BASOPHILS # BLD: 0.01 K/UL (ref 0–0.12)
BILIRUB SERPL-MCNC: 0.3 MG/DL (ref 0.1–1.5)
BLD GP AB SCN SERPL QL: NORMAL
BLOOD CULTURE HOLD CXBCH: NORMAL
BUN SERPL-MCNC: 30 MG/DL (ref 8–22)
CALCIUM ALBUM COR SERPL-MCNC: 9.2 MG/DL (ref 8.5–10.5)
CALCIUM SERPL-MCNC: 8.8 MG/DL (ref 8.5–10.5)
CHLORIDE SERPL-SCNC: 96 MMOL/L (ref 96–112)
CO2 SERPL-SCNC: 15 MMOL/L (ref 20–33)
CREAT SERPL-MCNC: 2.91 MG/DL (ref 0.5–1.4)
D DIMER PPP IA.FEU-MCNC: 10.64 UG/ML (FEU) (ref 0–0.5)
EOSINOPHIL # BLD AUTO: 0.01 K/UL (ref 0–0.51)
EOSINOPHIL NFR BLD: 0.3 % (ref 0–6.9)
ERYTHROCYTE [DISTWIDTH] IN BLOOD BY AUTOMATED COUNT: 44.2 FL (ref 35.9–50)
GFR SERPLBLD CREATININE-BSD FMLA CKD-EPI: 16 ML/MIN/1.73 M 2
GLOBULIN SER CALC-MCNC: 3.7 G/DL (ref 1.9–3.5)
GLUCOSE SERPL-MCNC: 95 MG/DL (ref 65–99)
HCT VFR BLD AUTO: 41.8 % (ref 37–47)
HGB BLD-MCNC: 14 G/DL (ref 12–16)
HGB BLD-MCNC: 14.3 G/DL (ref 12–16)
IMM GRANULOCYTES # BLD AUTO: 0.03 K/UL (ref 0–0.11)
IMM GRANULOCYTES NFR BLD AUTO: 0.8 % (ref 0–0.9)
INR PPP: 1.01 (ref 0.87–1.13)
LACTATE SERPL-SCNC: 1.5 MMOL/L (ref 0.5–2)
LACTATE SERPL-SCNC: 2.6 MMOL/L (ref 0.5–2)
LYMPHOCYTES # BLD AUTO: 1.46 K/UL (ref 1–4.8)
LYMPHOCYTES NFR BLD: 39.4 % (ref 22–41)
MCH RBC QN AUTO: 31 PG (ref 27–33)
MCHC RBC AUTO-ENTMCNC: 33.5 G/DL (ref 32.2–35.5)
MCV RBC AUTO: 92.7 FL (ref 81.4–97.8)
MONOCYTES # BLD AUTO: 0.51 K/UL (ref 0–0.85)
MONOCYTES NFR BLD AUTO: 13.7 % (ref 0–13.4)
NEUTROPHILS # BLD AUTO: 1.69 K/UL (ref 1.82–7.42)
NEUTROPHILS NFR BLD: 45.5 % (ref 44–72)
NRBC # BLD AUTO: 0 K/UL
NRBC BLD-RTO: 0 /100 WBC (ref 0–0.2)
PLATELET # BLD AUTO: 193 K/UL (ref 164–446)
PLATELET BLD QL SMEAR: NORMAL
PMV BLD AUTO: 11.4 FL (ref 9–12.9)
POTASSIUM SERPL-SCNC: 3.6 MMOL/L (ref 3.6–5.5)
PROT SERPL-MCNC: 7.2 G/DL (ref 6–8.2)
PROTHROMBIN TIME: 13.4 SEC (ref 12–14.6)
RBC # BLD AUTO: 4.51 M/UL (ref 4.2–5.4)
RH BLD: NORMAL
SODIUM SERPL-SCNC: 129 MMOL/L (ref 135–145)
TROPONIN T SERPL-MCNC: 41 NG/L (ref 6–19)
TROPONIN T SERPL-MCNC: 49 NG/L (ref 6–19)
WBC # BLD AUTO: 3.7 K/UL (ref 4.8–10.8)

## 2023-09-17 PROCEDURE — 93005 ELECTROCARDIOGRAM TRACING: CPT

## 2023-09-17 PROCEDURE — 85018 HEMOGLOBIN: CPT

## 2023-09-17 PROCEDURE — 700105 HCHG RX REV CODE 258: Mod: UD | Performed by: EMERGENCY MEDICINE

## 2023-09-17 PROCEDURE — 96374 THER/PROPH/DIAG INJ IV PUSH: CPT

## 2023-09-17 PROCEDURE — 86900 BLOOD TYPING SEROLOGIC ABO: CPT

## 2023-09-17 PROCEDURE — C9113 INJ PANTOPRAZOLE SODIUM, VIA: HCPCS | Mod: UD | Performed by: EMERGENCY MEDICINE

## 2023-09-17 PROCEDURE — 71045 X-RAY EXAM CHEST 1 VIEW: CPT

## 2023-09-17 PROCEDURE — 83605 ASSAY OF LACTIC ACID: CPT

## 2023-09-17 PROCEDURE — 36415 COLL VENOUS BLD VENIPUNCTURE: CPT

## 2023-09-17 PROCEDURE — 700102 HCHG RX REV CODE 250 W/ 637 OVERRIDE(OP): Performed by: HOSPITALIST

## 2023-09-17 PROCEDURE — 86901 BLOOD TYPING SEROLOGIC RH(D): CPT

## 2023-09-17 PROCEDURE — 85379 FIBRIN DEGRADATION QUANT: CPT

## 2023-09-17 PROCEDURE — 700105 HCHG RX REV CODE 258: Performed by: HOSPITALIST

## 2023-09-17 PROCEDURE — 700111 HCHG RX REV CODE 636 W/ 250 OVERRIDE (IP): Mod: UD | Performed by: EMERGENCY MEDICINE

## 2023-09-17 PROCEDURE — 85025 COMPLETE CBC W/AUTO DIFF WBC: CPT

## 2023-09-17 PROCEDURE — 85610 PROTHROMBIN TIME: CPT

## 2023-09-17 PROCEDURE — 93005 ELECTROCARDIOGRAM TRACING: CPT | Performed by: EMERGENCY MEDICINE

## 2023-09-17 PROCEDURE — 86850 RBC ANTIBODY SCREEN: CPT

## 2023-09-17 PROCEDURE — 80053 COMPREHEN METABOLIC PANEL: CPT

## 2023-09-17 PROCEDURE — 99285 EMERGENCY DEPT VISIT HI MDM: CPT

## 2023-09-17 PROCEDURE — A9270 NON-COVERED ITEM OR SERVICE: HCPCS | Performed by: HOSPITALIST

## 2023-09-17 PROCEDURE — 99223 1ST HOSP IP/OBS HIGH 75: CPT | Mod: AI | Performed by: HOSPITALIST

## 2023-09-17 PROCEDURE — 770020 HCHG ROOM/CARE - TELE (206)

## 2023-09-17 PROCEDURE — 84484 ASSAY OF TROPONIN QUANT: CPT

## 2023-09-17 PROCEDURE — 85730 THROMBOPLASTIN TIME PARTIAL: CPT

## 2023-09-17 RX ORDER — SODIUM CHLORIDE, SODIUM LACTATE, POTASSIUM CHLORIDE, AND CALCIUM CHLORIDE .6; .31; .03; .02 G/100ML; G/100ML; G/100ML; G/100ML
500 INJECTION, SOLUTION INTRAVENOUS
Status: DISCONTINUED | OUTPATIENT
Start: 2023-09-17 | End: 2023-09-20 | Stop reason: HOSPADM

## 2023-09-17 RX ORDER — ACETAMINOPHEN 325 MG/1
650 TABLET ORAL EVERY 6 HOURS PRN
Status: DISCONTINUED | OUTPATIENT
Start: 2023-09-17 | End: 2023-09-20 | Stop reason: HOSPADM

## 2023-09-17 RX ORDER — ONDANSETRON 2 MG/ML
4 INJECTION INTRAMUSCULAR; INTRAVENOUS EVERY 4 HOURS PRN
Status: DISCONTINUED | OUTPATIENT
Start: 2023-09-17 | End: 2023-09-20 | Stop reason: HOSPADM

## 2023-09-17 RX ORDER — ACETAMINOPHEN 325 MG/1
650 TABLET ORAL EVERY 6 HOURS PRN
COMMUNITY

## 2023-09-17 RX ORDER — ONDANSETRON 2 MG/ML
4 INJECTION INTRAMUSCULAR; INTRAVENOUS EVERY 4 HOURS PRN
Status: DISCONTINUED | OUTPATIENT
Start: 2023-09-17 | End: 2023-09-17

## 2023-09-17 RX ORDER — SODIUM CHLORIDE, SODIUM LACTATE, POTASSIUM CHLORIDE, AND CALCIUM CHLORIDE .6; .31; .03; .02 G/100ML; G/100ML; G/100ML; G/100ML
1000 INJECTION, SOLUTION INTRAVENOUS ONCE
Status: DISCONTINUED | OUTPATIENT
Start: 2023-09-17 | End: 2023-09-17

## 2023-09-17 RX ORDER — ONDANSETRON 4 MG/1
4 TABLET, ORALLY DISINTEGRATING ORAL EVERY 4 HOURS PRN
Status: DISCONTINUED | OUTPATIENT
Start: 2023-09-17 | End: 2023-09-20 | Stop reason: HOSPADM

## 2023-09-17 RX ORDER — SODIUM CHLORIDE, SODIUM LACTATE, POTASSIUM CHLORIDE, AND CALCIUM CHLORIDE .6; .31; .03; .02 G/100ML; G/100ML; G/100ML; G/100ML
30 INJECTION, SOLUTION INTRAVENOUS ONCE
Status: COMPLETED | OUTPATIENT
Start: 2023-09-17 | End: 2023-09-17

## 2023-09-17 RX ORDER — GUAIFENESIN/DEXTROMETHORPHAN 100-10MG/5
10 SYRUP ORAL EVERY 6 HOURS PRN
Status: DISCONTINUED | OUTPATIENT
Start: 2023-09-17 | End: 2023-09-20 | Stop reason: HOSPADM

## 2023-09-17 RX ORDER — SODIUM CHLORIDE, SODIUM LACTATE, POTASSIUM CHLORIDE, CALCIUM CHLORIDE 600; 310; 30; 20 MG/100ML; MG/100ML; MG/100ML; MG/100ML
INJECTION, SOLUTION INTRAVENOUS CONTINUOUS
Status: DISCONTINUED | OUTPATIENT
Start: 2023-09-17 | End: 2023-09-19

## 2023-09-17 RX ORDER — ATORVASTATIN CALCIUM 40 MG/1
40 TABLET, FILM COATED ORAL
Status: DISCONTINUED | OUTPATIENT
Start: 2023-09-17 | End: 2023-09-20 | Stop reason: HOSPADM

## 2023-09-17 RX ORDER — ALLOPURINOL 100 MG/1
100 TABLET ORAL EVERY EVENING
Status: DISCONTINUED | OUTPATIENT
Start: 2023-09-17 | End: 2023-09-20 | Stop reason: HOSPADM

## 2023-09-17 RX ADMIN — ALLOPURINOL 100 MG: 100 TABLET ORAL at 18:50

## 2023-09-17 RX ADMIN — PANTOPRAZOLE SODIUM 80 MG: 40 INJECTION, POWDER, FOR SOLUTION INTRAVENOUS at 14:51

## 2023-09-17 RX ADMIN — SODIUM CHLORIDE, POTASSIUM CHLORIDE, SODIUM LACTATE AND CALCIUM CHLORIDE 1710 ML: 600; 310; 30; 20 INJECTION, SOLUTION INTRAVENOUS at 14:15

## 2023-09-17 RX ADMIN — ATORVASTATIN CALCIUM 40 MG: 40 TABLET, FILM COATED ORAL at 20:38

## 2023-09-17 RX ADMIN — SODIUM CHLORIDE, POTASSIUM CHLORIDE, SODIUM LACTATE AND CALCIUM CHLORIDE: 600; 310; 30; 20 INJECTION, SOLUTION INTRAVENOUS at 21:44

## 2023-09-17 ASSESSMENT — PAIN DESCRIPTION - PAIN TYPE
TYPE: ACUTE PAIN
TYPE: ACUTE PAIN

## 2023-09-17 ASSESSMENT — LIFESTYLE VARIABLES
TOTAL SCORE: 0
DOES PATIENT WANT TO STOP DRINKING: NO
ALCOHOL_USE: NO
EVER FELT BAD OR GUILTY ABOUT YOUR DRINKING: NO
ON A TYPICAL DAY WHEN YOU DRINK ALCOHOL HOW MANY DRINKS DO YOU HAVE: 0
HAVE YOU EVER FELT YOU SHOULD CUT DOWN ON YOUR DRINKING: NO
EVER HAD A DRINK FIRST THING IN THE MORNING TO STEADY YOUR NERVES TO GET RID OF A HANGOVER: NO
AVERAGE NUMBER OF DAYS PER WEEK YOU HAVE A DRINK CONTAINING ALCOHOL: 0
CONSUMPTION TOTAL: NEGATIVE
HOW MANY TIMES IN THE PAST YEAR HAVE YOU HAD 5 OR MORE DRINKS IN A DAY: 0
HAVE PEOPLE ANNOYED YOU BY CRITICIZING YOUR DRINKING: NO
TOTAL SCORE: 0
TOTAL SCORE: 0

## 2023-09-17 ASSESSMENT — COGNITIVE AND FUNCTIONAL STATUS - GENERAL
HELP NEEDED FOR BATHING: A LOT
SUGGESTED CMS G CODE MODIFIER DAILY ACTIVITY: CK
PERSONAL GROOMING: A LITTLE
DAILY ACTIVITIY SCORE: 16
CLIMB 3 TO 5 STEPS WITH RAILING: TOTAL
EATING MEALS: A LITTLE
WALKING IN HOSPITAL ROOM: A LOT
MOBILITY SCORE: 11
DRESSING REGULAR UPPER BODY CLOTHING: A LITTLE
MOVING TO AND FROM BED TO CHAIR: A LOT
DRESSING REGULAR LOWER BODY CLOTHING: A LITTLE
TOILETING: A LOT
STANDING UP FROM CHAIR USING ARMS: A LOT
TURNING FROM BACK TO SIDE WHILE IN FLAT BAD: A LOT
MOVING FROM LYING ON BACK TO SITTING ON SIDE OF FLAT BED: A LOT
SUGGESTED CMS G CODE MODIFIER MOBILITY: CL

## 2023-09-17 ASSESSMENT — FIBROSIS 4 INDEX
FIB4 SCORE: 2.7
FIB4 SCORE: 3.9

## 2023-09-17 ASSESSMENT — PATIENT HEALTH QUESTIONNAIRE - PHQ9
2. FEELING DOWN, DEPRESSED, IRRITABLE, OR HOPELESS: NOT AT ALL
SUM OF ALL RESPONSES TO PHQ9 QUESTIONS 1 AND 2: 0
1. LITTLE INTEREST OR PLEASURE IN DOING THINGS: NOT AT ALL

## 2023-09-17 NOTE — ED TRIAGE NOTES
"Chief Complaint   Patient presents with    Flu Like Symptoms    Nausea/Vomiting/Diarrhea     BIBA Recently seen here and diagnosed covid +  States unable to tolerate any PO intake at home   Endorses n/v/d, weakness, headache   Ems states possible syncopal episode during transport        BP (!) 87/49   Pulse 64   Temp 36 °C (96.8 °F) (Temporal)   Resp 16   Ht 1.651 m (5' 5\")   Wt 98.9 kg (218 lb)   SpO2 100%   BMI 36.28 kg/m²     CRN aware  "

## 2023-09-17 NOTE — ASSESSMENT & PLAN NOTE
Hypovolemic hyponatremia in setting of diarrhea and poor intake  IV fluids and repeat sodium levels

## 2023-09-17 NOTE — H&P
Hospital Medicine History & Physical Note    Date of Service  9/17/2023    Primary Care Physician  ROCIO Delacruz    Consultants      Code Status  Prior    Chief Complaint  Chief Complaint   Patient presents with    Flu Like Symptoms    Nausea/Vomiting/Diarrhea     BIBA Recently seen here and diagnosed covid +  States unable to tolerate any PO intake at home   Endorses n/v/d, weakness, headache   Ems states possible syncopal episode during transport          History of Presenting Illness  Mimi Appiah is a 77 y.o. female who presented 9/17/2023 with History of hypertension recently evaluated in the emergency department on 9/14/2023 and diagnosed with COVID-19 and discharged home on Paxlovid .  Patient reports that she has been having nausea associate with poor intake and recurrent diarrhea and nonproductive cough .she has been feeling progressively weak with poor intake .today she noted episodes of rectal bleeding .she denies any melena she denies any hematemesis or coffee-ground emesis .she has been maintained on low-dose aspirin no other NSAIDs or anticoagulants .on presentation she was hypotensive with improvement of blood pressure with IV fluids .she denies any chest pain .she denies any hemoptysis .she denies any dyspnea.  She has been having chills and generalized malaise and myalgias        I discussed the plan of care with patient and ED physician .    Review of Systems  Review of Systems   All other systems reviewed and are negative.      Past Medical History   has a past medical history of Abnormal Pap smear, Abnormal Pap smear (04/11/2012), Abnormality of hormone (08/14/2020), Allergic rhinitis, BMI 40.0-44.9, adult (HCC) (07/01/2022), Congestion of nasal sinus (1/7/2020), Encounter for screening for malignant neoplasm of breast (04/11/2012), Eye tearing (10/11/2018), Finger deformity (12/11/2017), Hemorrhoids, HEMORRHOIDS (04/11/2012), Hypertension, LBP (low back pain), Muscle cramps  (4/17/2017), Night sweats (7/14/2020), Obesity (11/05/2014), Other specified disorders of bone density and structure, multiple sites (10/16/2019), Sequelae, 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    Surgical History   has a past surgical history that includes tubal coagulation laparoscopic bilateral.     Family History  family history includes Alcohol abuse in her father; Cancer in her brother and sister; Stroke in her father.        Social History   reports that she has been smoking cigarettes. She has never used smokeless tobacco. She reports that she does not drink alcohol and does not use drugs.    Allergies  Allergies   Allergen Reactions    Penicillins Swelling     Other reaction(s): head and face swelling    Risperdal [Benzoic Acid-Risperidone]      Other reaction(s): bad reaction all over body    Lima Beans Swelling     Pt gets swollen lips with butter and lima beans.      Peach [Prunus Persica] Nausea     Pt gets nauseous with peaches    Atenolol Swelling     Only at doses above 50mg    Chlorpheniramine        Medications  Prior to Admission Medications   Prescriptions Last Dose Informant Patient Reported? Taking?   Nirmatrelvir&Ritonavir 300/100 20 x 150 MG & 10 x 100MG Tablet Therapy Pack   No No   Sig: Take 150 mg nirmatrelvir (one 150 mg tablet) with 100 mg  ritonavir (one 100 mg tablet) by mouth, with both tablets taken together  twice daily for 5 days.   allopurinol (ZYLOPRIM) 100 MG Tab   No No   Sig: Take 1 Tablet by mouth every day.   amLODIPine (NORVASC) 10 MG Tab   No No   Sig: Take 1 Tablet by mouth every day.   aspirin EC (ECOTRIN) 81 MG Tablet Delayed Response   Yes No   Sig: Take 81 mg by mouth every day.   carvedilol (COREG) 6.25 MG Tab   No No   Sig: Take 1 Tablet by mouth 2 times a day with meals.      Facility-Administered Medications: None       Physical Exam  Temp:   [36 °C (96.8 °F)] 36 °C (96.8 °F)  Pulse:  [64-66] 66  Resp:  [10-16] 16  BP: ()/(49-78) 140/77  SpO2:  [94 %-100 %] 95 %  Blood Pressure : (!) 140/77   Temperature: 36 °C (96.8 °F)   Pulse: 66   Respiration: 16   Pulse Oximetry: 95 %       Physical Exam  Vitals and nursing note reviewed.   Constitutional:       General: She is not in acute distress.  HENT:      Head: Normocephalic and atraumatic.      Nose: Nose normal. No rhinorrhea.      Mouth/Throat:      Pharynx: No oropharyngeal exudate or posterior oropharyngeal erythema.   Eyes:      General: No scleral icterus.        Right eye: No discharge.         Left eye: No discharge.   Cardiovascular:      Rate and Rhythm: Normal rate and regular rhythm.      Heart sounds: Normal heart sounds. No murmur heard.     No friction rub. No gallop.   Pulmonary:      Effort: Pulmonary effort is normal. No respiratory distress.      Breath sounds: Normal breath sounds. No stridor. No wheezing, rhonchi or rales.   Chest:      Chest wall: No tenderness.   Abdominal:      General: Bowel sounds are normal. There is no distension.      Palpations: Abdomen is soft. There is no mass.      Tenderness: There is no abdominal tenderness. There is no rebound.   Musculoskeletal:         General: No swelling or tenderness.      Cervical back: Neck supple. No rigidity.   Skin:     General: Skin is warm and dry.      Coloration: Skin is not cyanotic or jaundiced.      Nails: There is no clubbing.   Neurological:      General: No focal deficit present.      Mental Status: She is alert and oriented to person, place, and time.      Cranial Nerves: No cranial nerve deficit.      Motor: No weakness.   Psychiatric:         Mood and Affect: Mood normal.         Behavior: Behavior normal.         Laboratory:  Recent Labs     09/14/23  1828 09/17/23  1419   WBC 5.4 3.7*   RBC 4.19* 4.51   HEMOGLOBIN 13.1 14.0   HEMATOCRIT 39.2 41.8   MCV 93.6 92.7   MCH 31.3 31.0   MCHC 33.4 33.5   RDW 44.1  "44.2   PLATELETCT 199 193   MPV 11.3 11.4     Recent Labs     09/14/23  1828 09/17/23  1243   SODIUM 131* 129*   POTASSIUM 3.9 3.6   CHLORIDE 98 96   CO2 18* 15*   GLUCOSE 80 95   BUN 22 30*   CREATININE 1.78* 2.91*   CALCIUM 9.7 8.8     Recent Labs     09/14/23  1828 09/17/23  1243   ALTSGPT <5 8   ASTSGOT 15 28   ALKPHOSPHAT 101* 92   TBILIRUBIN 0.4 0.3   GLUCOSE 80 95     Recent Labs     09/17/23  1419   APTT 24.2*   INR 1.01     No results for input(s): \"NTPROBNP\" in the last 72 hours.      Recent Labs     09/17/23  1419   TROPONINT 49*       Imaging:  DX-CHEST-PORTABLE (1 VIEW)   Final Result      1.  Hypoinflation without other evidence for acute cardiopulmonary disease.   2.  Stable cardiomegaly.             Assessment/Plan:  Justification for Admission Status  I anticipate this patient will require at least two midnights for appropriate medical management, necessitating inpatient admission because rectal bleeding in the setting of COVID infection with LEFTY    Patient will need a Telemetry bed on CARDIOLOGY service .  The need is secondary to elevated troponin and hypotension on presentation.    * LEFTY (acute kidney injury) (HCC)- (present on admission)  Assessment & Plan  Likely secondary to dehydration with poor intake and diarrhea  I ordered IV fluids for hydration  Avoid nephrotoxic agents  I ordered repeat chemistry panel if serum creatinine is not improving further work-up with urine electrolytes and renal ultrasound    COVID-19  Assessment & Plan  Patient is currently on room air  Likely the etiology of her diarrhea  Stop Paxlovid given worsening LEFTY discussed with pharmacist    Elevated d-dimer  Assessment & Plan  Likely secondary to her acute infection  Patient denies any chest pain she is currently on room air  Clinical suspicion for PE is low given her LEFTY hold off on further diagnostic testing unless she develops symptoms suggestive of PE    Hyponatremia  Assessment & Plan  Hypovolemic hyponatremia " in setting of diarrhea and poor intake  IV fluids and repeat sodium levels    Rectal bleeding  Assessment & Plan  IV fluids for hydration  Hold aspirin  Patient reports having normal colonoscopy although cannot recall timing of the procedure  Trend hemoglobin if significant drop will consult GI for evaluation for colonoscopy when more clinically stable    Lactic acidosis  Assessment & Plan  Likely secondary to dehydration  I ordered IV fluid bolus and maintenance fluids and repeat lactic acid    Elevated troponin  Assessment & Plan  Likely demand ischemia in the setting of LEFTY and hypotension  EKG with no acute ischemic changes  Patient denies chest pain  Trend troponin  Hold aspirin and beta-blocker given rectal bleeding  Check echocardiogram    Hypotension  Assessment & Plan  Secondary to dehydration  Hold home BP meds  I ordered IV fluids for hydration  Monitor blood pressure    Dyslipidemia- (present on admission)  Assessment & Plan  Start atorvastatin    History of CVA (cerebrovascular accident)- (present on admission)  Assessment & Plan  Hold aspirin given rectal bleeding  Reviewed most recent lipid panel  start atorvastatin        VTE prophylaxis: SCDs/TEDs

## 2023-09-17 NOTE — ASSESSMENT & PLAN NOTE
Likely demand ischemia in the setting of LEFTY and hypotension  EKG with no acute ischemic changes  Patient denies chest pain  Trend troponin  Hold aspirin and beta-blocker given rectal bleeding

## 2023-09-17 NOTE — ED PROVIDER NOTES
ER Provider Note    Scribed for Lenin Carrillo M.d. by Jenaro Pizarro. 9/17/2023  1:49 PM    Primary Care Provider: ROCIO Delacruz    CHIEF COMPLAINT  Chief Complaint   Patient presents with    Flu Like Symptoms    Nausea/Vomiting/Diarrhea     BIBA Recently seen here and diagnosed covid +  States unable to tolerate any PO intake at home   Endorses n/v/d, weakness, headache   Ems states possible syncopal episode during transport        EXTERNAL RECORDS REVIEWED  Other The patient presented to the ED on 9/14/23 and tested positive for COVID-19 at this time. Imaging at this time also revealed a AAA without rupture and the patient was referred to vascular surgery.     HPI/ROS  LIMITATION TO HISTORY   Select: : None    OUTSIDE HISTORIAN(S):  None    Mimi Appiah is a 77 y.o. female who presents to the ED via EMS for evaluation of bloody stools onset three days ago. The patient states she presented to the ED three days ago and was diagnosed with COVID-19. Since then she has had several episodes of bloody stools that she describes as blood tinged and pink in color. She also reports associated symptoms of increased diarrhea that is black in color. Additionally, she has associated symptoms of intermittent headaches, neck pain, and generalized weakness. She denies any vomiting, chest pain, or focal weakness or numbness. There are no known alleviating or exacerbating factors. She is not currently on blood thinners, but takes a baby aspirin daily.       PAST MEDICAL HISTORY  Past Medical History:   Diagnosis Date    Abnormal Pap smear     Abnormal Pap smear 04/11/2012    1970 precancerous lesions treated with cyro    Abnormality of hormone 08/14/2020    Allergic rhinitis     BMI 40.0-44.9, adult ([facility]) 07/01/2022    Congestion of nasal sinus 1/7/2020    Encounter for screening for malignant neoplasm of breast 04/11/2012    Preventative Health (Women): Tetanus - ? Pneumovax - 2005  Flu shot - 10/12 Stool  - 2005  Pap Smear - 2009, precancer of cervix 1970 - treated with cryo.  Decline further Mammogram - none Breast Exam - none Dexa Scan - none Colonscopy - 2005, was done in [facility].  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 She has never used smokeless tobacco. She reports that she does not drink alcohol and does not use drugs.    CURRENT MEDICATIONS  Previous Medications    ALLOPURINOL (ZYLOPRIM) 100 MG TAB    Take 1 Tablet by mouth every day.    AMLODIPINE (NORVASC) 10 MG TAB    Take 1 Tablet by mouth every day.    ASPIRIN EC (ECOTRIN) 81 MG TABLET DELAYED RESPONSE    Take 81 mg by mouth every day.    CARVEDILOL (COREG) 6.25 MG TAB    Take 1 Tablet by mouth 2 times a day with meals.    NIRMATRELVIR&RITONAVIR 300/100 20 X 150 MG & 10 X 100MG TABLET THERAPY PACK    Take 150 mg nirmatrelvir (one 150 mg tablet) with 100 mg  ritonavir (one 100 mg tablet) by mouth, with both tablets taken together  twice daily for 5 days.       ALLERGIES  Penicillins, Risperdal [benzoic acid-risperidone], Mednoza beans, Peach [prunus persica],  "Atenolol, and Chlorpheniramine    PHYSICAL EXAM  /78   Pulse 65   Temp 36 °C (96.8 °F) (Temporal)   Resp 12   Ht 1.651 m (5' 5\")   Wt 98.9 kg (218 lb)   SpO2 96%   BMI 36.28 kg/m²   Constitutional: Ill-appearing, Alert in no apparent distress.  HENT: No signs of trauma, Bilateral external ears normal, Nose normal. Uvula midline.   Eyes: Pupils are equal and reactive, Conjunctiva normal, Non-icteric.   Neck: Normal range of motion, No tenderness, Supple, No stridor.   Lymphatic: No lymphadenopathy noted.   Cardiovascular: Regular rate and rhythm, no murmurs.   Thorax & Lungs: Normal breath sounds, No respiratory distress, No wheezing, No chest tenderness.   Abdomen: Bowel sounds normal, Soft, No abdominal tenderness to palpation, No peritoneal signs, No masses, No pulsatile masses.   Skin: Warm, Dry, No erythema, No rash.   Back: No bony tenderness, No CVA tenderness.   Extremities: Intact distal pulses, No edema, No tenderness, No cyanosis.  Musculoskeletal: Good range of motion in all major joints. No tenderness to palpation or major deformities noted.   Neurologic: Alert , Normal motor function, Normal sensory function, No focal deficits noted.   Psychiatric: Affect normal, Judgment normal, Mood normal.          DIAGNOSTIC STUDIES    Labs:   Labs Reviewed   COMP METABOLIC PANEL - Abnormal; Notable for the following components:       Result Value    Sodium 129 (*)     Co2 15 (*)     Anion Gap 18.0 (*)     Bun 30 (*)     Creatinine 2.91 (*)     Globulin 3.7 (*)     All other components within normal limits   LACTIC ACID - Abnormal; Notable for the following components:    Lactic Acid 2.6 (*)     All other components within normal limits    Narrative:     Biotin intake of greater than 5 mg per day may interfere with  troponin levels, causing false low values.   TROPONIN - Abnormal; Notable for the following components:    Troponin T 49 (*)     All other components within normal limits    Narrative:     " Biotin intake of greater than 5 mg per day may interfere with  troponin levels, causing false low values.   ESTIMATED GFR - Abnormal; Notable for the following components:    GFR (CKD-EPI) 16 (*)     All other components within normal limits   CBC WITH DIFFERENTIAL - Abnormal; Notable for the following components:    WBC 3.7 (*)     Monocytes 13.70 (*)     Neutrophils (Absolute) 1.69 (*)     All other components within normal limits   APTT - Abnormal; Notable for the following components:    APTT 24.2 (*)     All other components within normal limits    Narrative:     Indicate which anticoagulants the patient is on:->UNKNOWN   D-DIMER - Abnormal; Notable for the following components:    D-Dimer 10.64 (*)     All other components within normal limits    Narrative:     Indicate which anticoagulants the patient is on:->UNKNOWN   COD (ADULT)   PROTHROMBIN TIME    Narrative:     Indicate which anticoagulants the patient is on:->UNKNOWN   PLATELET ESTIMATE   BLOOD CULTURE,HOLD   ABO RH CONFIRM        EKG:   I have independently interpreted this EKG   Report   Date Value Ref Range Status   2023       [facility] Emergency Dept.    Test Date:  2023  Pt Name:    EVELYNE SCHULTZ               Department: ER  MRN:        [id number]                      Room:  Gender:     Female                       Technician: [id number]  :        [id number]                   Requested By:ER TRIAGE PROTOCOL  Order #:    [id number]                    Reading MD:    Measurements  Intervals                                Axis  Rate:       66                           P:          -6  DE:         150                          QRS:        -34  QRSD:       102                          T:          1  QT:         461  QTc:        484    Interpretive Statements  Sinus rhythm  Atrial premature complex  Inferior infarct, old  Compared to ECG 05/15/2016 16:47:42  Atrial premature complex(es) now present  Ventricular premature  complex(es) no longer present  Myocardial infarct finding still present                 Radiology:   The attending emergency physician has independently interpreted the diagnostic imaging associated with this visit and am waiting the final reading from the radiologist.   Preliminary interpretation is a follows: no ptx  Radiologist interpretation:     DX-CHEST-PORTABLE (1 VIEW)   Final Result      1.  Hypoinflation without other evidence for acute cardiopulmonary disease.   2.  Stable cardiomegaly.           COURSE & MEDICAL DECISION MAKING     ED Observation Status? No; Patient does not meet criteria for ED Observation.     INITIAL ASSESSMENT, COURSE AND PLAN  Care Narrative: 77 y.o. F p/w CC of bloody stools and viral illness symptoms.      1:49 PM - Patient seen and examined at bedside. Discussed plan of care, including diagnostic lab work and probable hospitalization. Patient agrees to the plan of care. The patient will be given a LR infusion and medicated with Protonix 80 mg in  mL IVPB. Ordered for EKG, DX-Chest, PTT, APTT, COD, Lactic acid, D-Dimer, Troponin, CBC with differential, and CMP to evaluate her symptoms.     Given concern for sepsis patient given antibiotic within 3 hours of my initial assessment  Patient given 30 cc/kg bolus  No antibiotics given patient with no bacterial etiology of infection at this time  Patient with mild elevation in troponin likely secondary to LEFTY and elevated creatinine  Patient with scant bright red bloody stool likely in setting of diarrhea given this patient will be given single dose of Protonix in emergency department  Patient not hypoxic at this time therefore no steroids given for COVID    Patient reassessed by me multiple times throughout emergency department stay with gradual improvement.     3:02 PM Paged Hospitalist.    3:15 PM I discussed the patient's case and the above findings with Dr. Deniz Jaramillo (Hospitalist) who agrees to evaluate the patient for  hospitalization.    CRITICAL CARE  The very real possibility of a deterioration of this patient's condition required the highest level of my preparedness for sudden, emergent intervention.  I provided critical care services, which included medication orders, frequent reevaluations of the patient's condition and response to treatment, ordering and reviewing test results, and discussing the case with various consultants.  The critical care time associated with the care of the patient was 40 minutes. Review chart for interventions. This time is exclusive of any other billable procedures.            DISPOSITION AND DISCUSSIONS  I have discussed management of the patient with the following physicians and CHEN's: Dr. Deniz Jaramillo (Hospitalist)     Discussion of management with other \[facility]\"" or appropriate source(s): None     Barriers to care at this time, including but not limited to:  None known at this time .     DISPOSITION:  Patient will be hospitalized by Dr. Deniz Jaramillo in guarded condition.     FINAL DIAGNOSIS  1. LEFTY (acute kidney injury) (HCC)    2. Gastrointestinal hemorrhage, unspecified gastrointestinal hemorrhage type    3. COVID-19    4.      CCT 40 minutes    Jenaro SYED (Rajendra), am scribing for, and in the presence of, Lenin Carrillo M.D..    Electronically signed by: Jenaro Pizarro (Scribsha), 9/17/2023    ILenin M.D. personally performed the services described in this documentation, as scribed by Jenaro Pizarro in my presence, and it is both accurate and complete.      The note accurately reflects work and decisions made by me.  Lenin Carrillo M.D.  9/17/2023  9:13 PM

## 2023-09-17 NOTE — ASSESSMENT & PLAN NOTE
Likely secondary to dehydration  I ordered IV fluid bolus and maintenance fluids and repeat lactic acid

## 2023-09-17 NOTE — ASSESSMENT & PLAN NOTE
Patient is currently on room air  Likely the etiology of her diarrhea  Stop Paxlovid given worsening LEFTY discussed with pharmacist

## 2023-09-17 NOTE — ASSESSMENT & PLAN NOTE
Secondary to dehydration  Hold home BP meds  I ordered IV fluids for hydration  Monitor blood pressure  improved

## 2023-09-17 NOTE — ASSESSMENT & PLAN NOTE
Likely secondary to dehydration with poor intake and diarrhea  I ordered IV fluids for hydration  Avoid nephrotoxic agents  Improving with IV fluids  monitor

## 2023-09-17 NOTE — ASSESSMENT & PLAN NOTE
IV fluids for hydration  Hold aspirin  Patient reports having normal colonoscopy although cannot recall timing of the procedure  Trend hemoglobin if significant drop will consult GI for evaluation for colonoscopy when more clinically stable  Resolved, stable

## 2023-09-18 LAB
ALBUMIN SERPL BCP-MCNC: 3.3 G/DL (ref 3.2–4.9)
ALBUMIN/GLOB SERPL: 1 G/DL
ALP SERPL-CCNC: 81 U/L (ref 30–99)
ALT SERPL-CCNC: 8 U/L (ref 2–50)
ANION GAP SERPL CALC-SCNC: 13 MMOL/L (ref 7–16)
AST SERPL-CCNC: 23 U/L (ref 12–45)
BASOPHILS # BLD AUTO: 0.2 % (ref 0–1.8)
BASOPHILS # BLD: 0.01 K/UL (ref 0–0.12)
BILIRUB SERPL-MCNC: 0.2 MG/DL (ref 0.1–1.5)
BUN SERPL-MCNC: 27 MG/DL (ref 8–22)
CALCIUM ALBUM COR SERPL-MCNC: 9.1 MG/DL (ref 8.5–10.5)
CALCIUM SERPL-MCNC: 8.5 MG/DL (ref 8.5–10.5)
CHLORIDE SERPL-SCNC: 100 MMOL/L (ref 96–112)
CO2 SERPL-SCNC: 18 MMOL/L (ref 20–33)
CREAT SERPL-MCNC: 2.15 MG/DL (ref 0.5–1.4)
EOSINOPHIL # BLD AUTO: 0.01 K/UL (ref 0–0.51)
EOSINOPHIL NFR BLD: 0.2 % (ref 0–6.9)
ERYTHROCYTE [DISTWIDTH] IN BLOOD BY AUTOMATED COUNT: 43.3 FL (ref 35.9–50)
GFR SERPLBLD CREATININE-BSD FMLA CKD-EPI: 23 ML/MIN/1.73 M 2
GLOBULIN SER CALC-MCNC: 3.3 G/DL (ref 1.9–3.5)
GLUCOSE SERPL-MCNC: 136 MG/DL (ref 65–99)
HCT VFR BLD AUTO: 39.6 % (ref 37–47)
HGB BLD-MCNC: 13.4 G/DL (ref 12–16)
HGB BLD-MCNC: 13.9 G/DL (ref 12–16)
IMM GRANULOCYTES # BLD AUTO: 0.01 K/UL (ref 0–0.11)
IMM GRANULOCYTES NFR BLD AUTO: 0.2 % (ref 0–0.9)
LACTATE SERPL-SCNC: 1.5 MMOL/L (ref 0.5–2)
LACTATE SERPL-SCNC: 2.1 MMOL/L (ref 0.5–2)
LYMPHOCYTES # BLD AUTO: 2.57 K/UL (ref 1–4.8)
LYMPHOCYTES NFR BLD: 54.9 % (ref 22–41)
MAGNESIUM SERPL-MCNC: 1.9 MG/DL (ref 1.5–2.5)
MCH RBC QN AUTO: 31.4 PG (ref 27–33)
MCHC RBC AUTO-ENTMCNC: 33.8 G/DL (ref 32.2–35.5)
MCV RBC AUTO: 92.7 FL (ref 81.4–97.8)
MONOCYTES # BLD AUTO: 0.54 K/UL (ref 0–0.85)
MONOCYTES NFR BLD AUTO: 11.5 % (ref 0–13.4)
NEUTROPHILS # BLD AUTO: 1.54 K/UL (ref 1.82–7.42)
NEUTROPHILS NFR BLD: 33 % (ref 44–72)
NRBC # BLD AUTO: 0 K/UL
NRBC BLD-RTO: 0 /100 WBC (ref 0–0.2)
PLATELET # BLD AUTO: 176 K/UL (ref 164–446)
PMV BLD AUTO: 10.9 FL (ref 9–12.9)
POTASSIUM SERPL-SCNC: 3.2 MMOL/L (ref 3.6–5.5)
PROT SERPL-MCNC: 6.6 G/DL (ref 6–8.2)
RBC # BLD AUTO: 4.27 M/UL (ref 4.2–5.4)
SODIUM SERPL-SCNC: 131 MMOL/L (ref 135–145)
WBC # BLD AUTO: 4.7 K/UL (ref 4.8–10.8)

## 2023-09-18 PROCEDURE — 83605 ASSAY OF LACTIC ACID: CPT | Mod: 91

## 2023-09-18 PROCEDURE — 85025 COMPLETE CBC W/AUTO DIFF WBC: CPT

## 2023-09-18 PROCEDURE — 700102 HCHG RX REV CODE 250 W/ 637 OVERRIDE(OP): Mod: JZ | Performed by: STUDENT IN AN ORGANIZED HEALTH CARE EDUCATION/TRAINING PROGRAM

## 2023-09-18 PROCEDURE — 99232 SBSQ HOSP IP/OBS MODERATE 35: CPT | Performed by: STUDENT IN AN ORGANIZED HEALTH CARE EDUCATION/TRAINING PROGRAM

## 2023-09-18 PROCEDURE — 85018 HEMOGLOBIN: CPT

## 2023-09-18 PROCEDURE — 700102 HCHG RX REV CODE 250 W/ 637 OVERRIDE(OP): Performed by: HOSPITALIST

## 2023-09-18 PROCEDURE — A9270 NON-COVERED ITEM OR SERVICE: HCPCS | Performed by: HOSPITALIST

## 2023-09-18 PROCEDURE — A9270 NON-COVERED ITEM OR SERVICE: HCPCS | Mod: JZ | Performed by: STUDENT IN AN ORGANIZED HEALTH CARE EDUCATION/TRAINING PROGRAM

## 2023-09-18 PROCEDURE — 83735 ASSAY OF MAGNESIUM: CPT

## 2023-09-18 PROCEDURE — A9270 NON-COVERED ITEM OR SERVICE: HCPCS

## 2023-09-18 PROCEDURE — 770020 HCHG ROOM/CARE - TELE (206)

## 2023-09-18 PROCEDURE — 700102 HCHG RX REV CODE 250 W/ 637 OVERRIDE(OP)

## 2023-09-18 PROCEDURE — 80053 COMPREHEN METABOLIC PANEL: CPT

## 2023-09-18 PROCEDURE — 36415 COLL VENOUS BLD VENIPUNCTURE: CPT

## 2023-09-18 RX ORDER — POTASSIUM CHLORIDE 20 MEQ/1
40 TABLET, EXTENDED RELEASE ORAL ONCE
Status: COMPLETED | OUTPATIENT
Start: 2023-09-18 | End: 2023-09-18

## 2023-09-18 RX ORDER — LOPERAMIDE HYDROCHLORIDE 2 MG/1
2 CAPSULE ORAL 4 TIMES DAILY PRN
Status: DISCONTINUED | OUTPATIENT
Start: 2023-09-18 | End: 2023-09-20 | Stop reason: HOSPADM

## 2023-09-18 RX ADMIN — ALLOPURINOL 100 MG: 100 TABLET ORAL at 16:44

## 2023-09-18 RX ADMIN — LIDOCAINE HYDROCHLORIDE 30 ML: 20 SOLUTION OROPHARYNGEAL at 08:16

## 2023-09-18 RX ADMIN — POTASSIUM CHLORIDE 40 MEQ: 1500 TABLET, EXTENDED RELEASE ORAL at 04:02

## 2023-09-18 RX ADMIN — ATORVASTATIN CALCIUM 40 MG: 40 TABLET, FILM COATED ORAL at 21:19

## 2023-09-18 RX ADMIN — POTASSIUM CHLORIDE 40 MEQ: 1500 TABLET, EXTENDED RELEASE ORAL at 11:22

## 2023-09-18 ASSESSMENT — ENCOUNTER SYMPTOMS
FALLS: 0
BLOOD IN STOOL: 1
BLURRED VISION: 0
ABDOMINAL PAIN: 0
SENSORY CHANGE: 0
DIZZINESS: 0
INSOMNIA: 0
EYE PAIN: 0
NAUSEA: 0
BACK PAIN: 0
CHILLS: 0
WEAKNESS: 1
LOSS OF CONSCIOUSNESS: 0
FEVER: 0
DIARRHEA: 1
FOCAL WEAKNESS: 0
PALPITATIONS: 0
COUGH: 0
VOMITING: 0
SHORTNESS OF BREATH: 0
HEADACHES: 0

## 2023-09-18 ASSESSMENT — PAIN DESCRIPTION - PAIN TYPE
TYPE: ACUTE PAIN
TYPE: ACUTE PAIN

## 2023-09-18 ASSESSMENT — LIFESTYLE VARIABLES: SUBSTANCE_ABUSE: 0

## 2023-09-18 ASSESSMENT — FIBROSIS 4 INDEX: FIB4 SCORE: 3.51

## 2023-09-18 NOTE — PROGRESS NOTES
[facility] Medicine Daily Progress Note    Date of Service  9/18/2023    Chief Complaint  Mimi Appiah is a 77 y.o. female admitted 9/17/2023 with diarrhea, weakness.    Hospital Course  Mimi Appiah is a 77 y.o. female who presented 9/17/2023 with History of hypertension recently evaluated in the emergency department on 9/14/2023 and diagnosed with COVID-19 and discharged home on Paxlovid .  Patient reports that she has been having nausea associate with poor intake and recurrent diarrhea and nonproductive cough .she has been feeling progressively weak with poor intake .today she noted episodes of rectal bleeding .she denies any melena she denies any hematemesis or coffee-ground emesis .she has been maintained on low-dose aspirin no other NSAIDs or anticoagulants .on presentation she was hypotensive with improvement of blood pressure with IV fluids .she denies any chest pain .she denies any hemoptysis .she denies any dyspnea.  She has been having chills and generalized malaise and myalgias    Interval Problem Update  No acute events overnight.  Patient reports 3 episodes diarrhea yesterday, none as of lunch today.   Cr improving to 2.15. continue IV fluids for hydration and GI losses.  Hgb stable 13.9, stop H/H checks. Continue daily CBC for now. No signs of further rectal bleeding.  COVID positive 9/14, continue supportive treatment. On room air.  Advance diet to GI soft diet.  PT/OT evaluation, possible SNF.  Anticipate possible discharge in next 24-48 hours depending on patient's symptoms and clinical stability.    I have discussed this patient's plan of care and discharge plan at IDT rounds today with Case Management, Nursing, Nursing leadership, and other members of the IDT team.    Consultants/Specialty  none    Code Status  Full Code    Disposition  The patient is not medically cleared for discharge to home or a post-acute facility.  Anticipate discharge to: skilled nursing facility    I have placed  the appropriate orders for post-discharge needs.    Review of Systems  Review of Systems   Constitutional:  Negative for chills and fever.   Eyes:  Negative for blurred vision and pain.   Respiratory:  Negative for cough and shortness of breath.    Cardiovascular:  Negative for chest pain, palpitations and leg swelling.   Gastrointestinal:  Positive for blood in stool and diarrhea. Negative for abdominal pain, nausea and vomiting.   Genitourinary:  Negative for dysuria and urgency.   Musculoskeletal:  Negative for back pain and falls.   Skin:  Negative for itching and rash.   Neurological:  Positive for weakness. Negative for dizziness, sensory change, focal weakness, loss of consciousness and headaches.   Psychiatric/Behavioral:  Negative for substance abuse. The patient does not have insomnia.         Physical Exam  Temp:  [36.3 °C (97.3 °F)-36.6 °C (97.9 °F)] 36.3 °C (97.3 °F)  Pulse:  [65-76] 71  Resp:  [16-18] 16  BP: (121-151)/(66-94) 143/81  SpO2:  [93 %-96 %] 95 %    Physical Exam  Vitals reviewed.   Constitutional:       General: She is not in acute distress.     Appearance: She is not diaphoretic.   HENT:      Head: Normocephalic and atraumatic.      Right Ear: External ear normal.      Left Ear: External ear normal.      Nose: Nose normal. No congestion.      Mouth/Throat:      Pharynx: No oropharyngeal exudate or posterior oropharyngeal erythema.   Eyes:      Extraocular Movements: Extraocular movements intact.      Pupils: Pupils are equal, round, and reactive to light.   Cardiovascular:      Rate and Rhythm: Normal rate and regular rhythm.   Pulmonary:      Effort: Pulmonary effort is normal. No respiratory distress.      Breath sounds: Normal breath sounds. No wheezing.   Abdominal:      General: Bowel sounds are normal. There is no distension.      Palpations: Abdomen is soft.      Tenderness: There is no abdominal tenderness. There is no guarding.   Musculoskeletal:         General: No swelling.  Normal range of motion.      Cervical back: Normal range of motion and neck supple.      Right lower leg: No edema.      Left lower leg: No edema.   Skin:     General: Skin is warm and dry.   Neurological:      General: No focal deficit present.      Mental Status: She is alert and oriented to person, place, and time.      Cranial Nerves: No cranial nerve deficit.      Motor: No weakness.   Psychiatric:         Mood and Affect: Mood normal.         Behavior: Behavior normal.         Fluids    Intake/Output Summary (Last 24 hours) at 9/18/2023 1540  Last data filed at 9/18/2023 0340  Gross per 24 hour   Intake 240 ml   Output 850 ml   Net -610 ml       Laboratory  Recent Labs     09/17/23  1419 09/17/23  1959 09/18/23  0003 09/18/23  0400   WBC 3.7*  --  4.7*  --    RBC 4.51  --  4.27  --    HEMOGLOBIN 14.0 14.3 13.4 13.9   HEMATOCRIT 41.8  --  39.6  --    MCV 92.7  --  92.7  --    MCH 31.0  --  31.4  --    MCHC 33.5  --  33.8  --    RDW 44.2  --  43.3  --    PLATELETCT 193  --  176  --    MPV 11.4  --  10.9  --      Recent Labs     09/17/23  1243 09/18/23  0003   SODIUM 129* 131*   POTASSIUM 3.6 3.2*   CHLORIDE 96 100   CO2 15* 18*   GLUCOSE 95 136*   BUN 30* 27*   CREATININE 2.91* 2.15*   CALCIUM 8.8 8.5     Recent Labs     09/17/23  1419   APTT 24.2*   INR 1.01               Imaging  DX-CHEST-PORTABLE (1 VIEW)   Final Result      1.  Hypoinflation without other evidence for acute cardiopulmonary disease.   2.  Stable cardiomegaly.           Assessment/Plan  * LEFTY (acute kidney injury) (HCC)- (present on admission)  Assessment & Plan  Likely secondary to dehydration with poor intake and diarrhea  I ordered IV fluids for hydration  Avoid nephrotoxic agents  Improving with IV fluids  monitor    COVID-19  Assessment & Plan  Patient is currently on room air  Likely the etiology of her diarrhea  Stop Paxlovid given worsening LEFTY discussed with pharmacist    Elevated d-dimer  Assessment & Plan  Likely secondary to her  acute infection  Patient denies any chest pain she is currently on room air  Clinical suspicion for PE is low given her LEFTY hold off on further diagnostic testing unless she develops symptoms suggestive of PE    Hyponatremia  Assessment & Plan  Hypovolemic hyponatremia in setting of diarrhea and poor intake  IV fluids and repeat sodium levels    Rectal bleeding  Assessment & Plan  IV fluids for hydration  Hold aspirin  Patient reports having normal colonoscopy although cannot recall timing of the procedure  Trend hemoglobin if significant drop will consult GI for evaluation for colonoscopy when more clinically stable  Resolved, stable    Lactic acidosis  Assessment & Plan  Likely secondary to dehydration  I ordered IV fluid bolus and maintenance fluids and repeat lactic acid    Elevated troponin  Assessment & Plan  Likely demand ischemia in the setting of LEFTY and hypotension  EKG with no acute ischemic changes  Patient denies chest pain  Trend troponin  Hold aspirin and beta-blocker given rectal bleeding    Hypotension  Assessment & Plan  Secondary to dehydration  Hold home BP meds  I ordered IV fluids for hydration  Monitor blood pressure  improved    Dyslipidemia- (present on admission)  Assessment & Plan  Start atorvastatin    History of CVA (cerebrovascular accident)- (present on admission)  Assessment & Plan  Hold aspirin given rectal bleeding  Reviewed most recent lipid panel  start atorvastatin         VTE prophylaxis:   SCDs/TEDs

## 2023-09-18 NOTE — DISCHARGE PLANNING
Collaborated with  MARLENE Aceves from [facility]. PT/OT requested for possible placement. She got choices for HH if needed. CM will follow. Pt eval pending.

## 2023-09-18 NOTE — PROGRESS NOTES
Bedside report received from CARMEN Sanchez. Pt on RA. Patient A&O x 4. Pt does not complain of pain at this time. POC discussed with patient. Patient verbalizes understanding. Call light and belongings within reach. Bed locked in lowest position, alarm and fall precautions in place.

## 2023-09-18 NOTE — CARE PLAN
The patient is Stable - Low risk of patient condition declining or worsening    Shift Goals  Clinical Goals: Pt will have decreased N/V/D, BP will remain stable  Patient Goals: Rest  Family Goals: KAYE    Progress made toward(s) clinical / shift goals:   Problem: Fall Risk  Goal: Patient will remain free from falls  Outcome: Progressing  Note: Bed and chair alarm in use. Pt calls appropriately for help as needed. Educated on risk for falls.      Problem: Skin Integrity  Goal: Skin integrity is maintained or improved  Outcome: Progressing  Note: Pt ambulated up to chair for all meals.        Patient is not progressing towards the following goals:

## 2023-09-18 NOTE — PROGRESS NOTES
Received report from previous shift RN, assumed care of patient. Patient is A&Ox4, vital signs are stable, on room air. Patient denies pain at this time, no signs of distress or discomfort. Sitting up in bed for breakfast. Call light and belongings within reach. Bed in lowest/locked position. Hourly rounding in place.     1700: notified MD Adler of pt having high BP, asked if it they wanted to continue fluids. Okay to stop fluids per MD Adler

## 2023-09-18 NOTE — DIETARY
"Nutrition services: Day 1 of admit.  Mimi Appiah is a 77 y.o. female with admitting DX of acute kidney injury.   Consult received for unplanned wt loss of 14-23 lbs in 1 month with poor po (MST 3).     Assessment:  Height: 165.1 cm (5' 5\")  Weight: 91.4 kg (201 lb 8 oz)  Body mass index is 33.53 kg/m²., BMI classification: Obese Class I  Diet/Intake: Full liquid    Evaluation:   Pt COVID+. RD unable to visit pt at this time d/t department policy. Attempted to call pt, no response.   Per chart review pt was 248 lbs Jan 2023. Current wt via stand up scale is 201 lbs. Pt with a -18.95% wt loss in 9 months, wt loss is severe.   Unable to complete NFPE d/t COVID isolation precautions.   Per RN via voalte, pt eating % of meals so far today.   Labs: K+ 3.2, Glu 136, Na 131, BUN 27, Creatinine 2.15, GFR 23.   Meds reviewed.   +BM 9/17    Malnutrition Risk: Pt at risk with severe wt loss over the past year however unable to meet multiple criteria at this time.     Recommendations/Plan:  Encourage intake of >50%.   Document intake of all PO as % taken in ADL's to provide interdisciplinary communication across all shifts.   Monitor weight.  Nutrition rep will continue to see patient for ongoing meal and snack preferences.     RD to monitor per department policy.       "

## 2023-09-18 NOTE — CARE PLAN
The patient is Stable - Low risk of patient condition declining or worsening    Shift Goals  Clinical Goals: No n/v/d this shift, pt will not be hypotensive this shift  Patient Goals: sleep well, BP control  Family Goals: KAYE    Progress made toward(s) clinical / shift goals: Pt has not had a SBP below 120 this shift, pt is on RA, pt does not complain of pain, all fall precautions in place, no n/v/d    Patient is not progressing towards the following goals:      Problem: Hemodynamics  Goal: Patient's hemodynamics, fluid balance and neurologic status will be stable or improve  Outcome: Progressing     Problem: Respiratory  Goal: Patient will achieve/maintain optimum respiratory ventilation and gas exchange  Outcome: Progressing

## 2023-09-18 NOTE — PROGRESS NOTES
Care assumed on patient, A&O x4, brought from ED on stretcher. Visualized patient stand and pivot to floor bed. Pt endorses L foot pain which she related to gout. Pt is resting comfortably in bed on room air and denies SOB.     Tele on and rate and rhythm checked on monitor.     Bed alarm on and bed is in lowest and locked position. Patient is appropriate for call light use. Patient was oriented to bed and call light functions.     POC: IVF, monitor labs. Patient verbalized understanding.

## 2023-09-18 NOTE — PROGRESS NOTES
4 Eyes Skin Assessment Completed by CARMEN Sanchez and RICH Peterson.    Head WDL  Ears WDL  Nose WDL  Mouth WDL  Neck WDL  Breast/Chest WDL  Shoulder Blades WDL  Spine WDL  (R) Arm/Elbow/Hand WDL  (L) Arm/Elbow/Hand WDL  Abdomen WDL  Groin WDL  Scrotum/Coccyx/Buttocks WDL  (R) Leg WDL  (L) Leg WDL  (R) Heel/Foot/Toe WDL  (L) Heel/Foot/Toe WDL          Devices In Places Tele Box      Interventions In Place Pillows    Possible Skin Injury No    Pictures Uploaded Into Epic N/A  Wound Consult Placed N/A  RN Wound Prevention Protocol Ordered No

## 2023-09-19 LAB
ANION GAP SERPL CALC-SCNC: 12 MMOL/L (ref 7–16)
APPEARANCE UR: CLEAR
BILIRUB UR QL STRIP.AUTO: NEGATIVE
BUN SERPL-MCNC: 22 MG/DL (ref 8–22)
CALCIUM SERPL-MCNC: 8.6 MG/DL (ref 8.5–10.5)
CHLORIDE SERPL-SCNC: 100 MMOL/L (ref 96–112)
CO2 SERPL-SCNC: 22 MMOL/L (ref 20–33)
COLOR UR: YELLOW
CREAT SERPL-MCNC: 1.76 MG/DL (ref 0.5–1.4)
ERYTHROCYTE [DISTWIDTH] IN BLOOD BY AUTOMATED COUNT: 43.5 FL (ref 35.9–50)
GFR SERPLBLD CREATININE-BSD FMLA CKD-EPI: 29 ML/MIN/1.73 M 2
GLUCOSE SERPL-MCNC: 126 MG/DL (ref 65–99)
GLUCOSE UR STRIP.AUTO-MCNC: NEGATIVE MG/DL
HCT VFR BLD AUTO: 41.1 % (ref 37–47)
HGB BLD-MCNC: 14.1 G/DL (ref 12–16)
KETONES UR STRIP.AUTO-MCNC: NEGATIVE MG/DL
LEUKOCYTE ESTERASE UR QL STRIP.AUTO: NEGATIVE
MCH RBC QN AUTO: 32 PG (ref 27–33)
MCHC RBC AUTO-ENTMCNC: 34.3 G/DL (ref 32.2–35.5)
MCV RBC AUTO: 93.2 FL (ref 81.4–97.8)
MICRO URNS: NORMAL
NITRITE UR QL STRIP.AUTO: NEGATIVE
PH UR STRIP.AUTO: 7.5 [PH] (ref 5–8)
PLATELET # BLD AUTO: 157 K/UL (ref 164–446)
PMV BLD AUTO: 11.1 FL (ref 9–12.9)
POTASSIUM SERPL-SCNC: 4.2 MMOL/L (ref 3.6–5.5)
PROT UR QL STRIP: NEGATIVE MG/DL
RBC # BLD AUTO: 4.41 M/UL (ref 4.2–5.4)
RBC UR QL AUTO: NEGATIVE
SODIUM SERPL-SCNC: 134 MMOL/L (ref 135–145)
SP GR UR STRIP.AUTO: 1.01
UROBILINOGEN UR STRIP.AUTO-MCNC: 0.2 MG/DL
WBC # BLD AUTO: 6.1 K/UL (ref 4.8–10.8)

## 2023-09-19 PROCEDURE — 700105 HCHG RX REV CODE 258: Performed by: STUDENT IN AN ORGANIZED HEALTH CARE EDUCATION/TRAINING PROGRAM

## 2023-09-19 PROCEDURE — 97166 OT EVAL MOD COMPLEX 45 MIN: CPT

## 2023-09-19 PROCEDURE — 770001 HCHG ROOM/CARE - MED/SURG/GYN PRIV*

## 2023-09-19 PROCEDURE — 97162 PT EVAL MOD COMPLEX 30 MIN: CPT

## 2023-09-19 PROCEDURE — 99232 SBSQ HOSP IP/OBS MODERATE 35: CPT | Performed by: HOSPITALIST

## 2023-09-19 PROCEDURE — 85027 COMPLETE CBC AUTOMATED: CPT

## 2023-09-19 PROCEDURE — 700102 HCHG RX REV CODE 250 W/ 637 OVERRIDE(OP): Performed by: HOSPITALIST

## 2023-09-19 PROCEDURE — A9270 NON-COVERED ITEM OR SERVICE: HCPCS | Performed by: HOSPITALIST

## 2023-09-19 PROCEDURE — 81003 URINALYSIS AUTO W/O SCOPE: CPT

## 2023-09-19 PROCEDURE — 36415 COLL VENOUS BLD VENIPUNCTURE: CPT

## 2023-09-19 PROCEDURE — 80048 BASIC METABOLIC PNL TOTAL CA: CPT

## 2023-09-19 RX ADMIN — SODIUM CHLORIDE, POTASSIUM CHLORIDE, SODIUM LACTATE AND CALCIUM CHLORIDE: 600; 310; 30; 20 INJECTION, SOLUTION INTRAVENOUS at 09:33

## 2023-09-19 RX ADMIN — CHOLECALCIFEROL TAB 125 MCG (5000 UNIT) 5000 UNITS: 125 TAB at 10:27

## 2023-09-19 RX ADMIN — ALLOPURINOL 100 MG: 100 TABLET ORAL at 16:57

## 2023-09-19 RX ADMIN — GUAIFENESIN SYRUP AND DEXTROMETHORPHAN 10 ML: 100; 10 SYRUP ORAL at 16:57

## 2023-09-19 RX ADMIN — ATORVASTATIN CALCIUM 40 MG: 40 TABLET, FILM COATED ORAL at 20:49

## 2023-09-19 RX ADMIN — GUAIFENESIN SYRUP AND DEXTROMETHORPHAN 10 ML: 100; 10 SYRUP ORAL at 09:30

## 2023-09-19 ASSESSMENT — COGNITIVE AND FUNCTIONAL STATUS - GENERAL
MOVING TO AND FROM BED TO CHAIR: A LOT
HELP NEEDED FOR BATHING: A LITTLE
SUGGESTED CMS G CODE MODIFIER MOBILITY: CL
WALKING IN HOSPITAL ROOM: A LITTLE
CLIMB 3 TO 5 STEPS WITH RAILING: A LOT
DRESSING REGULAR UPPER BODY CLOTHING: A LITTLE
STANDING UP FROM CHAIR USING ARMS: A LITTLE
DAILY ACTIVITIY SCORE: 19
MOVING FROM LYING ON BACK TO SITTING ON SIDE OF FLAT BED: A LOT
TURNING FROM BACK TO SIDE WHILE IN FLAT BAD: A LOT
DRESSING REGULAR LOWER BODY CLOTHING: A LITTLE
MOBILITY SCORE: 14
PERSONAL GROOMING: A LITTLE
SUGGESTED CMS G CODE MODIFIER DAILY ACTIVITY: CK
TOILETING: A LITTLE

## 2023-09-19 ASSESSMENT — ENCOUNTER SYMPTOMS
HEADACHES: 0
BLOOD IN STOOL: 0
COUGH: 0
LOSS OF CONSCIOUSNESS: 0
CHILLS: 0
EYE PAIN: 0
FALLS: 0
WEAKNESS: 1
BACK PAIN: 0
VOMITING: 0
FEVER: 0
PALPITATIONS: 0
SHORTNESS OF BREATH: 0
DIARRHEA: 0
BLURRED VISION: 0
ABDOMINAL PAIN: 0
SENSORY CHANGE: 0
DIZZINESS: 0
INSOMNIA: 0
FOCAL WEAKNESS: 0
NAUSEA: 0

## 2023-09-19 ASSESSMENT — FIBROSIS 4 INDEX: FIB4 SCORE: 3.94

## 2023-09-19 ASSESSMENT — GAIT ASSESSMENTS
ASSISTIVE DEVICE: 4 WHEEL WALKER
DISTANCE (FEET): 15
GAIT LEVEL OF ASSIST: CONTACT GUARD ASSIST
DEVIATION: SHUFFLED GAIT;BRADYKINETIC

## 2023-09-19 ASSESSMENT — ACTIVITIES OF DAILY LIVING (ADL): TOILETING: INDEPENDENT

## 2023-09-19 ASSESSMENT — LIFESTYLE VARIABLES: SUBSTANCE_ABUSE: 0

## 2023-09-19 ASSESSMENT — PAIN DESCRIPTION - PAIN TYPE: TYPE: ACUTE PAIN

## 2023-09-19 NOTE — PROGRESS NOTES
Assumed care of patient from day shift RN at 1915, Patient AOX4, VSS.   Fall education reinforced, call bell within reach, bed locked and in lowest position. POC discussed and all questions answered.  Purposeful and or hourly rounding in place.

## 2023-09-19 NOTE — THERAPY
Physical Therapy   Initial Evaluation     Patient Name: Mimi Appiah  Age:  77 y.o., Sex:  female  Medical Record #: [id number]  Today's Date: 9/19/2023     Precautions  Precautions: Fall Risk    Assessment  Patient is 77 y.o. female admitted with diarrhea, weakness, and found to be COVID+ (dc home from ED on Paxlovid 9/14). PMH includes HTN. Pt complaining of general malaise and fatigue. Pt is functioning below her baseline and would benefit from continued PT services while in acute care setting and post acute placement at this time.     Plan    Physical Therapy Initial Treatment Plan   Treatment Plan : Gait Training, Neuro Re-Education / Balance, Self Care / Home Evaluation, Therapeutic Activities, Therapeutic Exercise  Treatment Frequency: 3 Times per Week  Duration: Until Therapy Goals Met    DC Equipment Recommendations: Unable to determine at this time  Discharge Recommendations: Recommend post-acute placement for additional physical therapy services prior to discharge home          09/19/23 1031   Pain 0 - 10 Group   Therapist Pain Assessment During Activity;Nurse Notified  (general malaise)   Prior Living Situation   Prior Services Home-Independent   Housing / Facility 1 Story Apartment / Condo   Steps Into Home 0   Steps In Home 0   Equipment Owned 4-Wheel Walker;Single Point Cane;Quad Cane   Lives with - Patient's Self Care Capacity Alone and Able to Care For Self   Comments pt lives alone with minimal social support   Prior Level of Functional Mobility   Bed Mobility Independent   Transfer Status Independent   Ambulation Independent   Ambulation Distance household   Assistive Devices Used 4-Wheel Walker   Comments independent at household levels   Cognition    Level of Consciousness Alert   Active ROM Lower Body    Active ROM Lower Body  WDL   Strength Lower Body   Lower Body Strength  X   Comments generally weak but functional   Sensation Lower Body   Lower Extremity Sensation   Not Tested   Other  Treatments   Other Treatments Provided limited by general malaise and fatigue   Balance Assessment   Sitting Balance (Static) Fair +   Sitting Balance (Dynamic) Fair +   Standing Balance (Static) Fair   Standing Balance (Dynamic) Fair -   Weight Shift Sitting Good   Weight Shift Standing Fair   Comments 4WW   Bed Mobility    Sit to Supine Supervised   Scooting Supervised   Comments flat bed   Gait Analysis   Gait Level Of Assist Contact Guard Assist   Assistive Device 4 Wheel Walker   Distance (Feet) 15   # of Times Distance was Traveled 2   Deviation Shuffled Gait;Bradykinetic   Weight Bearing Status no restrictions   Comments limited by fatigue   Functional Mobility   Sit to Stand Contact Guard Assist   Bed, Chair, Wheelchair Transfer Contact Guard Assist   Toilet Transfers Minimal Assist   Transfer Method Stand Step   Mobility in room with 4WW   Edema / Skin Assessment   Edema / Skin  Not Assessed   Patient / Family Goals    Patient / Family Goal #1 fell better   Short Term Goals    Short Term Goal # 1 pt will be able to complete functional transfers with 4WW and SPV in 6tx in order to dc home   Short Term Goal # 2 pt will be able to ambulate 50ft with 4WW and SPV in 6tx in order to dc home   Education Group   Education Provided Role of Physical Therapist   Role of Physical Therapist Patient Response Patient;Acceptance;Demonstration;Action Demonstration   Anticipated Discharge Equipment and Recommendations   DC Equipment Recommendations Unable to determine at this time   Discharge Recommendations Recommend post-acute placement for additional physical therapy services prior to discharge home

## 2023-09-19 NOTE — DISCHARGE PLANNING
HTH/SCP TCN chart review completed. Collaborated with BRADLEY Guerra. Current discharge considerations are post acute placement.     TCN will continue to follow and collaborate with discharge planning team as additional post acute needs arise. Thank you.    Completed:  PT recommending post acute placement - 9/19  Awaiting OT recommendations   Choice obtained: SNF  [facility] (1)  [facility] (2)  [facility] (3)  SCP with [facility] PCP.  Patient states she has a Follow up appointment scheduled for 9/27/23.

## 2023-09-19 NOTE — THERAPY
Occupational Therapy   Initial Evaluation     Patient Name: Mimi Appiah  Age:  77 y.o., Sex:  female  Medical Record #: [id number]  Today's Date: 9/19/2023     Precautions  Precautions: (P) Fall Risk    Assessment    Patient is 77 y.o. female admitted with diarrhea, weakness, and found to be COVID+ (dc home from ED on Paxlovid 9/14). PMH includes HTN. Pt normally independent with functional mobility and ADLs living in a GL apartment alone. Pt required Spring for functional mobility and ADLs, mostly limited by generalized weakness and poor activity tolerance. Will continue to see for skilled therapy while admitted as well as recommend post-acute placement at this time.       Plan    Occupational Therapy Initial Treatment Plan   Treatment Interventions: (P) Self Care / Activities of Daily Living, Adaptive Equipment, Neuro Re-Education / Balance, Therapeutic Exercises, Therapeutic Activity  Treatment Frequency: (P) 3 Times per Week  Duration: (P) Until Therapy Goals Met    DC Equipment Recommendations: (P) Unable to determine at this time  Discharge Recommendations: (P) Recommend post-acute placement for additional occupational therapy services prior to discharge home     Objective       09/19/23 1034   Prior Living Situation   Prior Services Home-Independent   Housing / Facility 1 Story Apartment / Condo   Steps Into Home 0   Steps In Home 0   Bathroom Set up Bathtub / Shower Combination   Equipment Owned 4-Wheel Walker   Lives with - Patient's Self Care Capacity Alone and Able to Care For Self   Prior Level of ADL Function   Self Feeding Independent   Grooming / Hygiene Independent   Bathing Other (Comments)  (states doesnt shower due to fear of falling)   Dressing Independent   Toileting Independent   Prior Level of IADL Function   Medication Management Independent   Laundry Independent   Kitchen Mobility Independent   Finances Independent   Home Management Independent   Shopping Independent   Prior Level Of  Mobility Independent With Device in Home   Driving / Transportation Utilizes Taxi Service for Transportation   Occupation (Pre-Hospital Vocational) Retired Due To Age   Precautions   Precautions Fall Risk   Pain 0 - 10 Group   Therapist Pain Assessment Post Activity Pain Same as Prior to Activity;Nurse Notified   Cognition    Cognition / Consciousness WDL   Level of Consciousness Alert   Active ROM Upper Body   Active ROM Upper Body  WDL   Dominant Hand Right   Strength Upper Body   Upper Body Strength  X   Gross Strength Generalized Weakness, Equal Bilaterally.    Sensation Upper Body   Upper Extremity Sensation  WDL   Upper Body Muscle Tone   Upper Body Muscle Tone  WDL   Neurological Concerns   Neurological Concerns No   Coordination Upper Body   Coordination WDL   Balance Assessment   Sitting Balance (Static) Fair +   Sitting Balance (Dynamic) Fair +   Standing Balance (Static) Fair   Standing Balance (Dynamic) Fair -   Weight Shift Sitting Good   Weight Shift Standing Fair   Comments w/ 4WW   Bed Mobility    Sit to Supine Supervised   Scooting Supervised   ADL Assessment   Grooming Contact Guard Assist;Standing   Upper Body Dressing Supervision   Lower Body Dressing Minimal Assist   Toileting Minimal Assist   How much help from another person does the patient currently need...   Putting on and taking off regular lower body clothing? 3   Bathing (including washing, rinsing, and drying)? 3   Toileting, which includes using a toilet, bedpan, or urinal? 3   Putting on and taking off regular upper body clothing? 3   Taking care of personal grooming such as brushing teeth? 3   Eating meals? 4   6 Clicks Daily Activity Score 19   Functional Mobility   Sit to Stand Contact Guard Assist   Bed, Chair, Wheelchair Transfer Contact Guard Assist   Toilet Transfers Contact Guard Assist   Transfer Method Stand Step   Mobility STS from chair, bathroom mobility, back to bed   Comments w/ 4WW   Activity Tolerance   Sitting in  Chair found seated in chair   Sitting Edge of Bed 5 min   Standing 10 min   Short Term Goals   Short Term Goal # 1 Pt will complete ADL transfers with supervision   Short Term Goal # 2 Pt will complete LB dressing with supervision   Short Term Goal # 3 Pt will complete standing G/H with supervision   Education Group   Education Provided Role of Occupational Therapist   Role of Occupational Therapist Patient Response Patient;Acceptance;Explanation   Occupational Therapy Initial Treatment Plan    Treatment Interventions Self Care / Activities of Daily Living;Adaptive Equipment;Neuro Re-Education / Balance;Therapeutic Exercises;Therapeutic Activity   Treatment Frequency 3 Times per Week   Duration Until Therapy Goals Met   Problem List   Problem List Decreased Active Daily Living Skills;Decreased Homemaking Skills;Decreased Upper Extremity Strength Left;Decreased Upper Extremity Strength Right;Decreased Functional Mobility;Decreased Activity Tolerance;Impaired Postural Control / Balance   Anticipated Discharge Equipment and Recommendations   DC Equipment Recommendations Unable to determine at this time   Discharge Recommendations Recommend post-acute placement for additional occupational therapy services prior to discharge home   Interdisciplinary Plan of Care Collaboration   IDT Collaboration with  Nursing;Physical Therapist   Patient Position at End of Therapy In Bed;Bed Alarm On;Call Light within Reach;Tray Table within Reach;Phone within Reach   Collaboration Comments RN updated

## 2023-09-19 NOTE — CARE PLAN
Problem: Knowledge Deficit - Standard  Goal: Patient and family/care givers will demonstrate understanding of plan of care, disease process/condition, diagnostic tests and medications  Outcome: Progressing     Problem: Fall Risk  Goal: Patient will remain free from falls  Outcome: Progressing   The patient is Watcher - Medium risk of patient condition declining or worsening    Shift Goals  Clinical Goals: monitor vs, respiratory, safety  Patient Goals: sleep  Family Goals: KAYE    Progress made toward(s) clinical / shift goals:  Fall precautions in place, Pt uses call bell appropriately and remains fall free.    Pt understands and has explained current POC this evening.    Patient is not progressing towards the following goals:

## 2023-09-19 NOTE — DISCHARGE PLANNING
"HTH/SCP TCN chart review completed. Collaborated with BRADLEY Dill prior to meeting with the pt. The most current review of medical record, knowledge of pt's PLOF and social support, LACE+ score of 65, 6 clicks scores of 16 ADL's and 11 mobility were considered.  Per chart review, patient was COVID + on 9/14/23 and discharged home on Paxlovid.  Patient admitted with N, V, & D and unable to tolerate any PO intake at home, had syncope episode during transport.  Dx with LEFTY, GI hemorrhage and COVID-19.      Pt seen at bedside. Introduced TCN program. Provided education regarding post acute levels of care. Discussed HTH/SCP plan benefits (Meds to Beds, [employer] and [facility] transitional care). Pt verbalizes understanding.     Patient states she lives alone in a ground floor apartment, her daughter is one of her close neighbors and states she was Modified independent with ADL's, IADL's, and mobility (with AD) at baseline.  She has two 4WW's, 2 SPC's, a 4 point cane and electric W/C.  She has a shower chair that she does not use stating it \"does not work\".  She is on RA and has some concerns with getting food and preparing meals and requests a referral to [employer] for Meals On Wheels.  TCN sent referral to [employer].  She uses [employer], [employer], taxi's and is aware of [employer] services.  Patient states she is not at her baseline level of function.      Requested provider consults for: PT/OT from Dr. Adler via voalte.  Choice proactively obtained for SNF, faxed to [facility] and given to BRADLEY.  TCN will continue to follow and collaborate with discharge planning team as additional post acute needs arise. Thank you.     Completed today:  Voalte sent to Víctor Adler MD requesting PT/OT consults on 9/18/23.    Choice obtained: SNF ([facility]. [facility], 3. [facility]).  Obtained on 9/18/23.    SCP with [facility] PCP.  Patient states she has a Follow up appointment scheduled for 9/27/23.      Addendum:  1817- Appreciate PT/OT consults.  "

## 2023-09-19 NOTE — PROGRESS NOTES
Monitor Summary  Rhythm: SR   Rate: 68-97  Ectopy: O PVC, R coup  Measurement: .14/.07/.49

## 2023-09-19 NOTE — PROGRESS NOTES
[facility] Medicine Daily Progress Note    Date of Service  9/19/2023    Chief Complaint  Mimi Appiah is a 77 y.o. female admitted 9/17/2023 with diarrhea, weakness.    Hospital Course  Mimi Appiah is a 77 y.o. female who presented 9/17/2023 with History of hypertension recently evaluated in the emergency department on 9/14/2023 and diagnosed with COVID-19 and discharged home on Paxlovid .  Patient reports that she has been having nausea associate with poor intake and recurrent diarrhea and nonproductive cough .she has been feeling progressively weak with poor intake .today she noted episodes of rectal bleeding .she denies any melena she denies any hematemesis or coffee-ground emesis .she has been maintained on low-dose aspirin no other NSAIDs or anticoagulants .on presentation she was hypotensive with improvement of blood pressure with IV fluids .she denies any chest pain .she denies any hemoptysis .she denies any dyspnea.  She has been having chills and generalized malaise and myalgias    Interval Problem Update  9/19: on RA, PT/OT recommending SNF.  Patient lives alone, daughter and grand-daughter visiting from [place].  Continues to feel very weak.  No longer with diarrhea.  Dc'd telemetry monitoring. NSR.    I have discussed this patient's plan of care and discharge plan at IDT rounds today with Case Management, Nursing, Nursing leadership, and other members of the IDT team.    Consultants/Specialty  none    Code Status  Full Code    Disposition  The patient is medically cleared for discharge to home or a post-acute facility.  Anticipate discharge to: skilled nursing facility    I have placed the appropriate orders for post-discharge needs.    Review of Systems  Review of Systems   Constitutional:  Positive for malaise/fatigue. Negative for chills and fever.   Eyes:  Negative for blurred vision and pain.   Respiratory:  Negative for cough and shortness of breath.    Cardiovascular:  Negative for chest  pain, palpitations and leg swelling.   Gastrointestinal:  Negative for abdominal pain, blood in stool, diarrhea, nausea and vomiting.   Genitourinary:  Negative for dysuria and urgency.   Musculoskeletal:  Negative for back pain and falls.   Skin:  Negative for itching and rash.   Neurological:  Positive for weakness. Negative for dizziness, sensory change, focal weakness, loss of consciousness and headaches.   Psychiatric/Behavioral:  Negative for substance abuse. The patient does not have insomnia.         Physical Exam  Temp:  [36 °C (96.8 °F)-36.8 °C (98.2 °F)] 36 °C (96.8 °F)  Pulse:  [73-88] 88  Resp:  [18] 18  BP: (101-163)/(72-93) 111/72  SpO2:  [99 %-100 %] 100 %    Physical Exam  Vitals and nursing note reviewed.   Constitutional:       General: She is not in acute distress.     Appearance: She is obese. She is ill-appearing. She is not diaphoretic.   HENT:      Head: Normocephalic and atraumatic.      Right Ear: External ear normal.      Left Ear: External ear normal.      Nose: Nose normal. No congestion.      Mouth/Throat:      Pharynx: No oropharyngeal exudate or posterior oropharyngeal erythema.   Eyes:      Extraocular Movements: Extraocular movements intact.      Pupils: Pupils are equal, round, and reactive to light.   Cardiovascular:      Rate and Rhythm: Normal rate and regular rhythm.   Pulmonary:      Effort: Pulmonary effort is normal. No respiratory distress.      Breath sounds: Normal breath sounds. No wheezing.   Abdominal:      General: Bowel sounds are normal. There is no distension.      Palpations: Abdomen is soft.      Tenderness: There is no abdominal tenderness. There is no guarding.   Musculoskeletal:         General: No swelling. Normal range of motion.      Cervical back: Normal range of motion and neck supple.      Right lower leg: No edema.      Left lower leg: No edema.   Skin:     General: Skin is warm and dry.   Neurological:      General: No focal deficit present.       Mental Status: She is alert and oriented to person, place, and time.      Cranial Nerves: No cranial nerve deficit.      Motor: No weakness.   Psychiatric:         Mood and Affect: Mood normal.         Behavior: Behavior normal.         Thought Content: Thought content normal.         Judgment: Judgment normal.         Fluids    Intake/Output Summary (Last 24 hours) at 9/19/2023 1649  Last data filed at 9/19/2023 0900  Gross per 24 hour   Intake 200 ml   Output --   Net 200 ml       Laboratory  Recent Labs     09/17/23  1419 09/17/23  1959 09/18/23  0003 09/18/23  0400 09/19/23  0055   WBC 3.7*  --  4.7*  --  6.1   RBC 4.51  --  4.27  --  4.41   HEMOGLOBIN 14.0   < > 13.4 13.9 14.1   HEMATOCRIT 41.8  --  39.6  --  41.1   MCV 92.7  --  92.7  --  93.2   MCH 31.0  --  31.4  --  32.0   MCHC 33.5  --  33.8  --  34.3   RDW 44.2  --  43.3  --  43.5   PLATELETCT 193  --  176  --  157*   MPV 11.4  --  10.9  --  11.1    < > = values in this interval not displayed.     Recent Labs     09/17/23  1243 09/18/23  0003 09/19/23  0055   SODIUM 129* 131* 134*   POTASSIUM 3.6 3.2* 4.2   CHLORIDE 96 100 100   CO2 15* 18* 22   GLUCOSE 95 136* 126*   BUN 30* 27* 22   CREATININE 2.91* 2.15* 1.76*   CALCIUM 8.8 8.5 8.6     Recent Labs     09/17/23  1419   APTT 24.2*   INR 1.01               Imaging  DX-CHEST-PORTABLE (1 VIEW)   Final Result      1.  Hypoinflation without other evidence for acute cardiopulmonary disease.   2.  Stable cardiomegaly.      US-EXTREMITY VENOUS LOWER BILAT    (Results Pending)        Assessment/Plan  * LEFTY (acute kidney injury) (HCC)- (present on admission)  Assessment & Plan  Likely secondary to dehydration with poor intake and diarrhea  I ordered IV fluids for hydration  Avoid nephrotoxic agents  Improving with IV fluids  monitor    COVID-19  Assessment & Plan  Patient is currently on room air  Likely the etiology of her diarrhea  Stop Paxlovid given worsening LEFTY discussed with pharmacist    Elevated  d-dimer  Assessment & Plan  Likely secondary to her acute infection  Patient denies any chest pain she is currently on room air  Clinical suspicion for PE is low given her LEFTY hold off on further diagnostic testing unless she develops symptoms suggestive of PE    Hyponatremia  Assessment & Plan  Hypovolemic hyponatremia in setting of diarrhea and poor intake  IV fluids and repeat sodium levels    Rectal bleeding  Assessment & Plan  IV fluids for hydration  Hold aspirin  Patient reports having normal colonoscopy although cannot recall timing of the procedure  Trend hemoglobin if significant drop will consult GI for evaluation for colonoscopy when more clinically stable  Resolved, stable    Lactic acidosis  Assessment & Plan  Likely secondary to dehydration  I ordered IV fluid bolus and maintenance fluids and repeat lactic acid    Elevated troponin  Assessment & Plan  Likely demand ischemia in the setting of LEFTY and hypotension  EKG with no acute ischemic changes  Patient denies chest pain  Trend troponin  Hold aspirin and beta-blocker given rectal bleeding    Hypotension  Assessment & Plan  Secondary to dehydration  Hold home BP meds  I ordered IV fluids for hydration  Monitor blood pressure  improved    Dyslipidemia- (present on admission)  Assessment & Plan  Start atorvastatin    History of CVA (cerebrovascular accident)- (present on admission)  Assessment & Plan  Hold aspirin given rectal bleeding  Reviewed most recent lipid panel  start atorvastatin         VTE prophylaxis:    heparin ppx

## 2023-09-19 NOTE — DISCHARGE PLANNING
Case Management Discharge Planning    Admission Date: 9/17/2023  GMLOS: 3.6  ALOS: 2    6-Clicks ADL Score: 16  6-Clicks Mobility Score: 14  PT and/or OT Eval ordered: Yes  Post-acute Referrals Ordered: Yes  Post-acute Choice Obtained: Yes  Has referral(s) been sent to post-acute provider:  Yes      Anticipated Discharge Dispo:  SNF    DME Needed: none    Action(s) Taken: Noted PT/OT recs for SNF. DPA notified to go ahead and send SNF referral. Choice taken by TCN for SCP.    [id number] PAS  [id number] LOC    SNF referrals sent.    Escalations Completed: n/a    Medically Clear: no    Next Steps: follow up with SNF    Barriers to Discharge: pending SNF acceptance    Is the patient up for discharge tomorrow: hopefully.

## 2023-09-20 ENCOUNTER — PATIENT OUTREACH (OUTPATIENT)
Dept: SCHEDULING | Facility: IMAGING CENTER | Age: 78
End: 2023-09-20

## 2023-09-20 ENCOUNTER — APPOINTMENT (OUTPATIENT)
Dept: RADIOLOGY | Facility: MEDICAL CENTER | Age: 78
DRG: 682 | End: 2023-09-20
Attending: HOSPITALIST
Payer: MEDICARE

## 2023-09-20 VITALS
OXYGEN SATURATION: 100 % | DIASTOLIC BLOOD PRESSURE: 70 MMHG | RESPIRATION RATE: 17 BRPM | HEART RATE: 80 BPM | BODY MASS INDEX: 34.01 KG/M2 | TEMPERATURE: 97.6 F | SYSTOLIC BLOOD PRESSURE: 104 MMHG | WEIGHT: 204.15 LBS | HEIGHT: 65 IN

## 2023-09-20 PROCEDURE — A9270 NON-COVERED ITEM OR SERVICE: HCPCS | Performed by: HOSPITALIST

## 2023-09-20 PROCEDURE — 99239 HOSP IP/OBS DSCHRG MGMT >30: CPT | Performed by: STUDENT IN AN ORGANIZED HEALTH CARE EDUCATION/TRAINING PROGRAM

## 2023-09-20 PROCEDURE — 700102 HCHG RX REV CODE 250 W/ 637 OVERRIDE(OP): Performed by: HOSPITALIST

## 2023-09-20 PROCEDURE — 93970 EXTREMITY STUDY: CPT

## 2023-09-20 PROCEDURE — 93970 EXTREMITY STUDY: CPT | Mod: 26 | Performed by: INTERNAL MEDICINE

## 2023-09-20 RX ORDER — GUAIFENESIN/DEXTROMETHORPHAN 100-10MG/5
10 SYRUP ORAL EVERY 6 HOURS PRN
Qty: 840 ML | Status: SHIPPED
Start: 2023-09-20 | End: 2023-10-04

## 2023-09-20 RX ADMIN — CHOLECALCIFEROL TAB 125 MCG (5000 UNIT) 5000 UNITS: 125 TAB at 07:00

## 2023-09-20 RX ADMIN — ACETAMINOPHEN 650 MG: 325 TABLET, FILM COATED ORAL at 05:35

## 2023-09-20 ASSESSMENT — PAIN DESCRIPTION - PAIN TYPE
TYPE: ACUTE PAIN
TYPE: ACUTE PAIN

## 2023-09-20 NOTE — DISCHARGE PLANNING
Case Management Discharge Planning    Admission Date: 9/17/2023  GMLOS: 3.6  ALOS: 3    6-Clicks ADL Score: 19  6-Clicks Mobility Score: 14  PT and/or OT Eval ordered: Yes  Post-acute Referrals Ordered: Yes  Post-acute Choice Obtained: Yes  Has referral(s) been sent to post-acute provider:  Yes      Anticipated Discharge Dispo: Discharge Disposition: D/T to SNF with Medicare cert in anticipation of skilled care (03)    DME Needed: No    Action(s) Taken: Updated Provider/Nurse on Discharge Plan    Escalations Completed: None    Medically Clear: Yes    Next Steps: n/a    Barriers to Discharge: Transportation    Is the patient up for discharge tomorrow: No  Patient is medically clear to discharge  Patient has been accepted by [facility] and can take her today  Sw faxed transport communication form to [facility] for a tentative transport time of 4:00 to [facility].    Patient verbally told, gave verbal permission     Volt to Attending/patient's nurse

## 2023-09-20 NOTE — PROGRESS NOTES
Assumed care from telemetry RN @ 031. Pt brought by wheelchair. Stable. On RA. IV patent. Skin assessment completed- no wounds, scattered bruising.

## 2023-09-20 NOTE — DISCHARGE PLANNING
DC Transport Scheduled    Received request at: 9/20/2023 at 1158    Transport Company Scheduled:  [employer]    Scheduled Date: 9/20/2023  Scheduled Time: 1630    Destination: [facility] at [address]     Notified care team of scheduled transport via Voalte.     If there are any changes needed to the DC transportation scheduled, please contact [facility] at ext. [phone] between the hours of [phone] Mon-Fri. If outside those hours, contact the ED Case Manager at ext. [phone].

## 2023-09-20 NOTE — DISCHARGE INSTRUCTIONS
- follow-up with GI and may repeat colonoscopy if rectal bleeding recurs  - continue hold blood pressure meds at discharge.  keep BP log and follow-up with primary care physician for further management

## 2023-09-20 NOTE — PROGRESS NOTES
4 Eyes Skin Assessment Completed by Dee CEVALLOS, CARMEN and Compa MAJANO RN.    Head WDL  Ears WDL  Nose WDL  Mouth WDL  Neck WDL  Breast/Chest WDL  Shoulder Blades WDL  Spine WDL  (R) Arm/Elbow/Hand WDL  (L) Arm/Elbow/Hand WDL  Abdomen WDL  Groin WDL  Scrotum/Coccyx/Buttocks WDL  (R) Leg WDL  (L) Leg WDL  (R) Heel/Foot/Toe WDL  (L) Heel/Foot/Toe WDL          Devices In Places SCD's      Interventions In Place N/A    Possible Skin Injury No    Pictures Uploaded Into Epic N/A  Wound Consult Placed N/A  RN Wound Prevention Protocol Ordered No

## 2023-09-20 NOTE — PROGRESS NOTES
Bedside report recieved from night shift CARMEN Chatman. patient resting comfortably in bed, call bell in reach. no acute distress noted. patient is a/o x 4, patient offers no complaints at this time.

## 2023-09-20 NOTE — CARE PLAN
The patient is Stable - Low risk of patient condition declining or worsening    Shift Goals  Clinical Goals: monitor O2, monitor labs, safety  Patient Goals: rest, safety  Family Goals: not present    Progress made toward(s) clinical / shift goals:    Problem: Knowledge Deficit - Standard  Goal: Patient and family/care givers will demonstrate understanding of plan of care, disease process/condition, diagnostic tests and medications  Outcome: Progressing     Problem: Respiratory  Goal: Patient will achieve/maintain optimum respiratory ventilation and gas exchange  Outcome: Progressing  Note: Patient maintaining oxygen saturation on room air.      Problem: Mechanical Ventilation  Goal: Safe management of artificial airway and ventilation  Outcome: Met  Goal: Successful weaning off mechanical ventilator, spontaneously maintains adequate gas exchange  Outcome: Met  Goal: Patient will be able to express needs and understand communication  Outcome: Met     Problem: Physical Regulation  Goal: Diagnostic test results will improve  Outcome: Progressing  Goal: Signs and symptoms of infection will decrease  Outcome: Progressing     Problem: Fall Risk  Goal: Patient will remain free from falls  Outcome: Progressing     Problem: Skin Integrity  Goal: Skin integrity is maintained or improved  Outcome: Progressing       Patient is not progressing towards the following goals: n/a

## 2023-09-20 NOTE — PROGRESS NOTES
Patient discharged via wheelchair with medical transport crew at this time. All belongings sent with patient. Patient's home meds retrieved from [facility] pharmacy and given to patient. Patient offers no questions/concerns at time of discharge. All paperwork sent with transport crew. Patient in no acute distress.

## 2023-09-20 NOTE — DISCHARGE SUMMARY
Discharge Summary    CHIEF COMPLAINT ON ADMISSION  Chief Complaint   Patient presents with    Flu Like Symptoms    Nausea/Vomiting/Diarrhea     BIBA Recently seen here and diagnosed covid +  States unable to tolerate any PO intake at home   Endorses n/v/d, weakness, headache   Ems states possible syncopal episode during transport          Reason for Admission  ems    Admission Date  9/17/2023     CODE STATUS  Full Code    HPI & HOSPITAL COURSE  Mimi Appiah is a 77 y.o. female who presented 9/17/2023 with History of hypertension recently evaluated in the emergency department on 9/14/2023 and diagnosed with COVID-19 and discharged home on Paxlovid .    Patient was admitted for hypotension, worsening cough, generalized weakness, poor intake, also noted rectal bleeding. she denies any melena, hematemesis or coffee-ground emesis .hypotension resolved with IV fluids and supportive care.  Her home BP meds Coreg and amlodipine have been on hold.  Her BP has been in normal range, will continue hold BP meds at discharge.  She was advised to keep BP log and follow-up with primary care physician for further management  LEFTY resolved with IV fluid.  Her creatinine is back to the baseline 1.7. Avoid nephrotoxic agents  For COVID-19, Paxlovid was stopped due to LEFTY. currently on room air. no further treatment required  For rectal bleeding - Patient reports having normal colonoscopy although cannot recall timing of the procedure.  Self resolved during this hospitalization.  Could be due to hemorrhoids.  Hemoglobin has been normal and stable 14s.  Patient was advised to follow-up with GI and may repeat her colonoscopy if rectal bleeding recurs    Therefore, she is discharged in good and stable condition to skilled nursing facility.    The patient met 2-midnight criteria for an inpatient stay at the time of discharge.      FOLLOW UP ITEMS POST DISCHARGE      - follow-up with GI and may repeat her colonoscopy if rectal bleeding  recurs  - continue hold blood pressure meds at discharge.  keep BP log and follow-up with primary care physician for further management    DISCHARGE DIAGNOSES  Principal Problem:    LEFTY (acute kidney injury) (HCC) (POA: Yes)  Active Problems:    History of CVA (cerebrovascular accident) (POA: Yes)      Overview: States she had her last stroke 3 years ago. States she has been told she       has had 3 in the past. Currently not on statin therapy at this time.       States she had an intolerance to Lipitor, will start low dose Rosuvastatin       at this time.     Dyslipidemia (POA: Yes)    Hypotension (POA: Unknown)    Elevated troponin (POA: Unknown)    Lactic acidosis (POA: Unknown)    Rectal bleeding (POA: Unknown)    Hyponatremia (POA: Unknown)    Elevated d-dimer (POA: Unknown)    COVID-19 (POA: Unknown)  Resolved Problems:    * No resolved hospital problems. *      FOLLOW UP  Future Appointments   Date Time Provider Department Center   9/27/2023  2:00 PM ROCIO Delacruz 76[facility]   9/28/2023  2:30 PM Margaret Buening, PT [address]   10/5/2023  2:30 PM Margaret Buening, PT [address]   10/12/2023  2:30 PM Margaret Buening, PT [address]   10/19/2023  2:30 PM Margaret Buening, PT [address]   10/26/2023  2:30 PM Margaret Buening, PT [address]   11/2/2023  2:30 PM Margaret Buening, PT [address]     No follow-up provider specified.    MEDICATIONS ON DISCHARGE     Medication List        START taking these medications        Instructions   vitamin D3 5000 Unit (125 mcg) Tabs  Start taking on: September 21, 2023  Commonly known as: Cholecalciferol   Take 1 Tablet by mouth every day.  Dose: 5,000 Units            CONTINUE taking these medications        Instructions   acetaminophen 500 MG Tabs  Commonly known as: Tylenol   Take 1,000 mg by mouth every 6 hours as needed for Moderate Pain or Fever.  Dose: 1,000 mg     allopurinol 100 MG Tabs  Commonly known as:  Zyloprim   Take 1 Tablet by mouth every day.  Dose: 100 mg     aspirin EC 81 MG Tbec  Commonly known as: Ecotrin   Take 81 mg by mouth every day.  Dose: 81 mg     Nirmatrelvir&Ritonavir 300/100 20 x 150 MG & 10 x 100MG Tbpk   Take 150 mg nirmatrelvir (one 150 mg tablet) with 100 mg  ritonavir (one 100 mg tablet) by mouth, with both tablets taken together  twice daily for 5 days.            STOP taking these medications      amLODIPine 10 MG Tabs  Commonly known as: Norvasc     carvedilol 6.25 MG Tabs  Commonly known as: Coreg              Allergies  Allergies   Allergen Reactions    Penicillins Swelling     Other reaction(s): head and face swelling    Risperdal [Benzoic Acid-Risperidone]      Other reaction(s): bad reaction all over body    Lima Beans Swelling     Pt gets swollen lips with butter and lima beans.      Peach [Prunus Persica] Nausea     Pt gets nauseous with peaches    Atenolol Swelling     Only at doses above 50mg    Chlorpheniramine        DIET  Orders Placed This Encounter   Procedures    Diet Order Diet: Low Fiber(GI Soft)     Standing Status:   Standing     Number of Occurrences:   1     Order Specific Question:   Diet:     Answer:   Low Fiber(GI Soft) [2]       ACTIVITY  As tolerated.  Weight bearing as tolerated    LINES, DRAINS, AND WOUNDS  This is an automated list. Peripheral IVs will be removed prior to discharge.  Peripheral IV 09/17/23 22 G Anterior;Right Forearm (Active)   Site Assessment Clean;Dry;Intact 09/20/23 1100   Dressing Type Transparent 09/20/23 1100   Line Status Scrubbed the hub prior to access;Flushed;Saline locked 09/20/23 1100   Dressing Status Clean;Dry;Intact 09/20/23 1100   Dressing Intervention N/A 09/20/23 1100   Dressing Change Due 09/23/23 09/20/23 1100   Date Primary Tubing Changed 09/20/23 09/19/23 2100   Infiltration Grading ([facility], [facility]) 0 09/20/23 1100   Phlebitis Scale ([facility] Only) 0 09/20/23 1100          Peripheral IV 09/17/23 22 G Anterior;Right Forearm  (Active)   Site Assessment Clean;Dry;Intact 09/20/23 1100   Dressing Type Transparent 09/20/23 1100   Line Status Scrubbed the hub prior to access;Flushed;Saline locked 09/20/23 1100   Dressing Status Clean;Dry;Intact 09/20/23 1100   Dressing Intervention N/A 09/20/23 1100   Dressing Change Due 09/23/23 09/20/23 1100   Date Primary Tubing Changed 09/20/23 09/19/23 2100   Infiltration Grading ([facility], [facility]) 0 09/20/23 1100   Phlebitis Scale ([facility] Only) 0 09/20/23 1100               MENTAL STATUS ON TRANSFER             CONSULTATIONS      PROCEDURES      LABORATORY  Lab Results   Component Value Date    SODIUM 134 (L) 09/19/2023    POTASSIUM 4.2 09/19/2023    CHLORIDE 100 09/19/2023    CO2 22 09/19/2023    GLUCOSE 126 (H) 09/19/2023    BUN 22 09/19/2023    CREATININE 1.76 (H) 09/19/2023        Lab Results   Component Value Date    WBC 6.1 09/19/2023    HEMOGLOBIN 14.1 09/19/2023    HEMATOCRIT 41.1 09/19/2023    PLATELETCT 157 (L) 09/19/2023        Total time of the discharge process exceeds 34 minutes.

## 2023-09-20 NOTE — DISCHARGE PLANNING
Agency/Facility Name: [facility]  Spoke To: Jessica  Outcome: [facility] can accept the pt today if medically clear.    CHRIS Savage notified via Teams.    @1148  Agency/Facility Name: [facility]  Spoke To: Jessica  Outcome: Jessica will get auth from [facility].  Requested transport time is 0050-1056.    @1306  DPA notified Jessica at [facility] that the pt will transport at 1630 via [facility].

## 2023-09-20 NOTE — DISCHARGE PLANNING
"SCP TCN chart review completed. Collaborated with BRADLEY Savage. Current discharge considerations are SNF. Note [facility] and [facility] have accepted ([facility] declined). Per MD, patient is medically cleared for discharge. SCP auth obtained for [facility]. [facility] is scheduled to transport patient at 1630 today    PT/OT evals completed with recommendation for post acute placement.    TCN spoke with patient over the phone. HIPPA verified. Patient would prefer to go to [facility] as it is down the street from her home. TCN let her know CM is looking into facility's covid protocol, but they may not be able to take patient until she is covid-cleared on 9/24 or 9/25. Patient okay with this saying \"well I have no choice.\" TCN will continue to follow and collaborate with discharge planning team as additional post acute needs arise. Thank you.    Completed:  PT/OT with recs for post acute placement  Choice obtained: SNF  [facility] (1)-declined  [facility] (2)-accepted  [facility] (3)-accepted  SCP with [facility] PCP.  Patient states she has a Follow up appointment scheduled for 9/27/23.      *1300 Addendum - TCN spoke with patient again to give her update that she will go to [facility] today at 1630.     "

## 2023-09-27 ENCOUNTER — APPOINTMENT (OUTPATIENT)
Dept: MEDICAL GROUP | Facility: MEDICAL CENTER | Age: 78
End: 2023-09-27
Payer: MEDICARE

## 2023-09-28 ENCOUNTER — APPOINTMENT (OUTPATIENT)
Dept: PHYSICAL THERAPY | Facility: REHABILITATION | Age: 78
End: 2023-09-28
Payer: MEDICARE

## 2023-09-29 ENCOUNTER — HOME HEALTH ADMISSION (OUTPATIENT)
Dept: HOME HEALTH SERVICES | Facility: HOME HEALTHCARE | Age: 78
End: 2023-09-29
Payer: MEDICARE

## 2023-10-04 ENCOUNTER — HOSPITAL ENCOUNTER (OUTPATIENT)
Facility: MEDICAL CENTER | Age: 78
End: 2023-10-06
Attending: EMERGENCY MEDICINE | Admitting: INTERNAL MEDICINE
Payer: MEDICARE

## 2023-10-04 ENCOUNTER — APPOINTMENT (OUTPATIENT)
Dept: RADIOLOGY | Facility: MEDICAL CENTER | Age: 78
End: 2023-10-04
Attending: EMERGENCY MEDICINE
Payer: MEDICARE

## 2023-10-04 ENCOUNTER — APPOINTMENT (OUTPATIENT)
Dept: RADIOLOGY | Facility: MEDICAL CENTER | Age: 78
End: 2023-10-04
Attending: INTERNAL MEDICINE
Payer: MEDICARE

## 2023-10-04 DIAGNOSIS — R54 AGE-RELATED PHYSICAL DEBILITY: ICD-10-CM

## 2023-10-04 DIAGNOSIS — Z91.81 RISK FOR FALLS: ICD-10-CM

## 2023-10-04 DIAGNOSIS — N17.9 AKI (ACUTE KIDNEY INJURY) (HCC): ICD-10-CM

## 2023-10-04 DIAGNOSIS — E86.0 DEHYDRATION: ICD-10-CM

## 2023-10-04 DIAGNOSIS — R19.7 DIARRHEA, UNSPECIFIED TYPE: ICD-10-CM

## 2023-10-04 DIAGNOSIS — G31.9 DEGENERATIVE DISEASE OF NERVOUS SYSTEM, UNSPECIFIED (HCC): ICD-10-CM

## 2023-10-04 PROBLEM — Z71.89 ADVANCED CARE PLANNING/COUNSELING DISCUSSION: Status: ACTIVE | Noted: 2023-10-04

## 2023-10-04 PROBLEM — E66.09 CLASS 1 OBESITY DUE TO EXCESS CALORIES WITH SERIOUS COMORBIDITY AND BODY MASS INDEX (BMI) OF 33.0 TO 33.9 IN ADULT: Chronic | Status: ACTIVE | Noted: 2022-07-01

## 2023-10-04 LAB
ALBUMIN SERPL BCP-MCNC: 3.1 G/DL (ref 3.2–4.9)
ALBUMIN/GLOB SERPL: 0.7 G/DL
ALP SERPL-CCNC: 125 U/L (ref 30–99)
ALT SERPL-CCNC: 16 U/L (ref 2–50)
ANION GAP SERPL CALC-SCNC: 15 MMOL/L (ref 7–16)
AST SERPL-CCNC: 20 U/L (ref 12–45)
BASOPHILS # BLD AUTO: 0.9 % (ref 0–1.8)
BASOPHILS # BLD: 0.07 K/UL (ref 0–0.12)
BILIRUB SERPL-MCNC: 0.5 MG/DL (ref 0.1–1.5)
BUN SERPL-MCNC: 25 MG/DL (ref 8–22)
CALCIUM ALBUM COR SERPL-MCNC: 10 MG/DL (ref 8.5–10.5)
CALCIUM SERPL-MCNC: 9.3 MG/DL (ref 8.5–10.5)
CHLORIDE SERPL-SCNC: 101 MMOL/L (ref 96–112)
CK SERPL-CCNC: 48 U/L (ref 0–154)
CO2 SERPL-SCNC: 16 MMOL/L (ref 20–33)
CREAT SERPL-MCNC: 2.15 MG/DL (ref 0.5–1.4)
EKG IMPRESSION: NORMAL
EKG IMPRESSION: NORMAL
EOSINOPHIL # BLD AUTO: 0.16 K/UL (ref 0–0.51)
EOSINOPHIL NFR BLD: 2 % (ref 0–6.9)
ERYTHROCYTE [DISTWIDTH] IN BLOOD BY AUTOMATED COUNT: 45.2 FL (ref 35.9–50)
GFR SERPLBLD CREATININE-BSD FMLA CKD-EPI: 23 ML/MIN/1.73 M 2
GLOBULIN SER CALC-MCNC: 4.3 G/DL (ref 1.9–3.5)
GLUCOSE SERPL-MCNC: 85 MG/DL (ref 65–99)
HCT VFR BLD AUTO: 38.5 % (ref 37–47)
HGB BLD-MCNC: 12.8 G/DL (ref 12–16)
IMM GRANULOCYTES # BLD AUTO: 0.04 K/UL (ref 0–0.11)
IMM GRANULOCYTES NFR BLD AUTO: 0.5 % (ref 0–0.9)
LACTATE SERPL-SCNC: 1.3 MMOL/L (ref 0.5–2)
LACTATE SERPL-SCNC: 2.2 MMOL/L (ref 0.5–2)
LIPASE SERPL-CCNC: 18 U/L (ref 11–82)
LYMPHOCYTES # BLD AUTO: 1.68 K/UL (ref 1–4.8)
LYMPHOCYTES NFR BLD: 20.7 % (ref 22–41)
MCH RBC QN AUTO: 31.9 PG (ref 27–33)
MCHC RBC AUTO-ENTMCNC: 33.2 G/DL (ref 32.2–35.5)
MCV RBC AUTO: 96 FL (ref 81.4–97.8)
MONOCYTES # BLD AUTO: 0.74 K/UL (ref 0–0.85)
MONOCYTES NFR BLD AUTO: 9.1 % (ref 0–13.4)
NEUTROPHILS # BLD AUTO: 5.41 K/UL (ref 1.82–7.42)
NEUTROPHILS NFR BLD: 66.8 % (ref 44–72)
NRBC # BLD AUTO: 0 K/UL
NRBC BLD-RTO: 0 /100 WBC (ref 0–0.2)
OSMOLALITY SERPL: 284 MOSM/KG H2O (ref 278–298)
PLATELET # BLD AUTO: 310 K/UL (ref 164–446)
PMV BLD AUTO: 10.2 FL (ref 9–12.9)
POTASSIUM SERPL-SCNC: 4.2 MMOL/L (ref 3.6–5.5)
PROT SERPL-MCNC: 7.4 G/DL (ref 6–8.2)
RBC # BLD AUTO: 4.01 M/UL (ref 4.2–5.4)
SODIUM SERPL-SCNC: 132 MMOL/L (ref 135–145)
WBC # BLD AUTO: 8.1 K/UL (ref 4.8–10.8)

## 2023-10-04 PROCEDURE — 83605 ASSAY OF LACTIC ACID: CPT

## 2023-10-04 PROCEDURE — 83690 ASSAY OF LIPASE: CPT

## 2023-10-04 PROCEDURE — 82550 ASSAY OF CK (CPK): CPT

## 2023-10-04 PROCEDURE — 80053 COMPREHEN METABOLIC PANEL: CPT

## 2023-10-04 PROCEDURE — G0378 HOSPITAL OBSERVATION PER HR: HCPCS

## 2023-10-04 PROCEDURE — 96372 THER/PROPH/DIAG INJ SC/IM: CPT

## 2023-10-04 PROCEDURE — 700105 HCHG RX REV CODE 258: Mod: UD | Performed by: EMERGENCY MEDICINE

## 2023-10-04 PROCEDURE — 700105 HCHG RX REV CODE 258: Mod: UD | Performed by: STUDENT IN AN ORGANIZED HEALTH CARE EDUCATION/TRAINING PROGRAM

## 2023-10-04 PROCEDURE — A9270 NON-COVERED ITEM OR SERVICE: HCPCS | Mod: UD | Performed by: INTERNAL MEDICINE

## 2023-10-04 PROCEDURE — 700111 HCHG RX REV CODE 636 W/ 250 OVERRIDE (IP): Mod: UD | Performed by: INTERNAL MEDICINE

## 2023-10-04 PROCEDURE — 99285 EMERGENCY DEPT VISIT HI MDM: CPT

## 2023-10-04 PROCEDURE — 71045 X-RAY EXAM CHEST 1 VIEW: CPT

## 2023-10-04 PROCEDURE — 76705 ECHO EXAM OF ABDOMEN: CPT

## 2023-10-04 PROCEDURE — 36415 COLL VENOUS BLD VENIPUNCTURE: CPT

## 2023-10-04 PROCEDURE — 93005 ELECTROCARDIOGRAM TRACING: CPT | Performed by: EMERGENCY MEDICINE

## 2023-10-04 PROCEDURE — 700102 HCHG RX REV CODE 250 W/ 637 OVERRIDE(OP): Mod: UD | Performed by: INTERNAL MEDICINE

## 2023-10-04 PROCEDURE — 700105 HCHG RX REV CODE 258: Mod: UD | Performed by: INTERNAL MEDICINE

## 2023-10-04 PROCEDURE — 83930 ASSAY OF BLOOD OSMOLALITY: CPT

## 2023-10-04 PROCEDURE — 99223 1ST HOSP IP/OBS HIGH 75: CPT | Mod: 25,AI | Performed by: INTERNAL MEDICINE

## 2023-10-04 PROCEDURE — 85025 COMPLETE CBC W/AUTO DIFF WBC: CPT

## 2023-10-04 PROCEDURE — 99497 ADVNCD CARE PLAN 30 MIN: CPT | Mod: 25 | Performed by: INTERNAL MEDICINE

## 2023-10-04 RX ORDER — ACETAMINOPHEN 325 MG/1
650 TABLET ORAL EVERY 6 HOURS PRN
Status: DISCONTINUED | OUTPATIENT
Start: 2023-10-04 | End: 2023-10-06 | Stop reason: HOSPADM

## 2023-10-04 RX ORDER — SODIUM CHLORIDE, SODIUM LACTATE, POTASSIUM CHLORIDE, CALCIUM CHLORIDE 600; 310; 30; 20 MG/100ML; MG/100ML; MG/100ML; MG/100ML
1000 INJECTION, SOLUTION INTRAVENOUS ONCE
Status: COMPLETED | OUTPATIENT
Start: 2023-10-04 | End: 2023-10-04

## 2023-10-04 RX ORDER — ONDANSETRON 2 MG/ML
4 INJECTION INTRAMUSCULAR; INTRAVENOUS EVERY 4 HOURS PRN
Status: DISCONTINUED | OUTPATIENT
Start: 2023-10-04 | End: 2023-10-06 | Stop reason: HOSPADM

## 2023-10-04 RX ORDER — ONDANSETRON 4 MG/1
4 TABLET, ORALLY DISINTEGRATING ORAL EVERY 4 HOURS PRN
Status: DISCONTINUED | OUTPATIENT
Start: 2023-10-04 | End: 2023-10-06 | Stop reason: HOSPADM

## 2023-10-04 RX ORDER — LEVOFLOXACIN 750 MG/1
750 TABLET, FILM COATED ORAL DAILY
Status: ON HOLD | COMMUNITY
End: 2023-10-06

## 2023-10-04 RX ORDER — BISACODYL 10 MG
10 SUPPOSITORY, RECTAL RECTAL
Status: DISCONTINUED | OUTPATIENT
Start: 2023-10-04 | End: 2023-10-04

## 2023-10-04 RX ORDER — CETIRIZINE HYDROCHLORIDE 10 MG/1
10 TABLET ORAL
COMMUNITY
End: 2023-10-19

## 2023-10-04 RX ORDER — AMOXICILLIN 250 MG
2 CAPSULE ORAL 2 TIMES DAILY
Status: DISCONTINUED | OUTPATIENT
Start: 2023-10-04 | End: 2023-10-04

## 2023-10-04 RX ORDER — SODIUM CHLORIDE 9 MG/ML
INJECTION, SOLUTION INTRAVENOUS CONTINUOUS
Status: ACTIVE | OUTPATIENT
Start: 2023-10-04 | End: 2023-10-05

## 2023-10-04 RX ORDER — POLYETHYLENE GLYCOL 3350 17 G/17G
1 POWDER, FOR SOLUTION ORAL
Status: DISCONTINUED | OUTPATIENT
Start: 2023-10-04 | End: 2023-10-04

## 2023-10-04 RX ORDER — HEPARIN SODIUM 5000 [USP'U]/ML
5000 INJECTION, SOLUTION INTRAVENOUS; SUBCUTANEOUS EVERY 8 HOURS
Status: DISCONTINUED | OUTPATIENT
Start: 2023-10-04 | End: 2023-10-06 | Stop reason: HOSPADM

## 2023-10-04 RX ORDER — SODIUM CHLORIDE, SODIUM LACTATE, POTASSIUM CHLORIDE, AND CALCIUM CHLORIDE .6; .31; .03; .02 G/100ML; G/100ML; G/100ML; G/100ML
500 INJECTION, SOLUTION INTRAVENOUS ONCE
Status: COMPLETED | OUTPATIENT
Start: 2023-10-04 | End: 2023-10-04

## 2023-10-04 RX ADMIN — SODIUM CHLORIDE, POTASSIUM CHLORIDE, SODIUM LACTATE AND CALCIUM CHLORIDE 500 ML: 600; 310; 30; 20 INJECTION, SOLUTION INTRAVENOUS at 19:44

## 2023-10-04 RX ADMIN — HEPARIN SODIUM 5000 UNITS: 5000 INJECTION, SOLUTION INTRAVENOUS; SUBCUTANEOUS at 22:38

## 2023-10-04 RX ADMIN — SODIUM CHLORIDE: 9 INJECTION, SOLUTION INTRAVENOUS at 12:28

## 2023-10-04 RX ADMIN — SODIUM CHLORIDE: 9 INJECTION, SOLUTION INTRAVENOUS at 21:16

## 2023-10-04 RX ADMIN — SODIUM CHLORIDE, POTASSIUM CHLORIDE, SODIUM LACTATE AND CALCIUM CHLORIDE 1000 ML: 600; 310; 30; 20 INJECTION, SOLUTION INTRAVENOUS at 10:53

## 2023-10-04 RX ADMIN — HEPARIN SODIUM 5000 UNITS: 5000 INJECTION, SOLUTION INTRAVENOUS; SUBCUTANEOUS at 16:48

## 2023-10-04 RX ADMIN — ACETAMINOPHEN 650 MG: 325 TABLET, FILM COATED ORAL at 22:39

## 2023-10-04 ASSESSMENT — ENCOUNTER SYMPTOMS
INSOMNIA: 0
SHORTNESS OF BREATH: 0
ABDOMINAL PAIN: 0
DIARRHEA: 1
DIZZINESS: 0
BLURRED VISION: 0
BACK PAIN: 0
CHILLS: 0
NAUSEA: 0
NERVOUS/ANXIOUS: 0
HEADACHES: 0
COUGH: 0
PALPITATIONS: 0
EYE PAIN: 0
WEAKNESS: 1
VOMITING: 0
FEVER: 0

## 2023-10-04 ASSESSMENT — LIFESTYLE VARIABLES
TOTAL SCORE: 0
EVER FELT BAD OR GUILTY ABOUT YOUR DRINKING: NO
ALCOHOL_USE: NO
HOW MANY TIMES IN THE PAST YEAR HAVE YOU HAD 5 OR MORE DRINKS IN A DAY: 0
ON A TYPICAL DAY WHEN YOU DRINK ALCOHOL HOW MANY DRINKS DO YOU HAVE: 0
TOTAL SCORE: 0
HAVE YOU EVER FELT YOU SHOULD CUT DOWN ON YOUR DRINKING: NO
AVERAGE NUMBER OF DAYS PER WEEK YOU HAVE A DRINK CONTAINING ALCOHOL: 0
CONSUMPTION TOTAL: NEGATIVE
DOES PATIENT WANT TO STOP DRINKING: NO
HAVE PEOPLE ANNOYED YOU BY CRITICIZING YOUR DRINKING: NO
TOTAL SCORE: 0
EVER HAD A DRINK FIRST THING IN THE MORNING TO STEADY YOUR NERVES TO GET RID OF A HANGOVER: NO

## 2023-10-04 ASSESSMENT — PAIN DESCRIPTION - PAIN TYPE
TYPE: ACUTE PAIN
TYPE: ACUTE PAIN

## 2023-10-04 ASSESSMENT — COGNITIVE AND FUNCTIONAL STATUS - GENERAL
TURNING FROM BACK TO SIDE WHILE IN FLAT BAD: A LITTLE
DAILY ACTIVITIY SCORE: 16
WALKING IN HOSPITAL ROOM: A LITTLE
PERSONAL GROOMING: A LOT
MOVING FROM LYING ON BACK TO SITTING ON SIDE OF FLAT BED: A LOT
DRESSING REGULAR UPPER BODY CLOTHING: A LOT
STANDING UP FROM CHAIR USING ARMS: A LITTLE
SUGGESTED CMS G CODE MODIFIER MOBILITY: CK
HELP NEEDED FOR BATHING: A LITTLE
SUGGESTED CMS G CODE MODIFIER DAILY ACTIVITY: CK
EATING MEALS: A LITTLE
DRESSING REGULAR LOWER BODY CLOTHING: A LITTLE
TOILETING: A LITTLE
MOVING TO AND FROM BED TO CHAIR: A LITTLE
MOBILITY SCORE: 16
CLIMB 3 TO 5 STEPS WITH RAILING: A LOT

## 2023-10-04 ASSESSMENT — FIBROSIS 4 INDEX
FIB4 SCORE: 3.94
FIB4 SCORE: 1.23
FIB4 SCORE: 1.23

## 2023-10-04 ASSESSMENT — PATIENT HEALTH QUESTIONNAIRE - PHQ9
SUM OF ALL RESPONSES TO PHQ9 QUESTIONS 1 AND 2: 0
1. LITTLE INTEREST OR PLEASURE IN DOING THINGS: NOT AT ALL
2. FEELING DOWN, DEPRESSED, IRRITABLE, OR HOPELESS: NOT AT ALL
SUM OF ALL RESPONSES TO PHQ9 QUESTIONS 1 AND 2: 0
2. FEELING DOWN, DEPRESSED, IRRITABLE, OR HOPELESS: NOT AT ALL

## 2023-10-04 NOTE — ED PROVIDER NOTES
ED Provider Note    CHIEF COMPLAINT  Chief Complaint   Patient presents with    Diarrhea    Malaise       EXTERNAL RECORDS REVIEWED  Inpatient Notes patient was admitted to the hospital 9/17/2023 and discharged 9/20/2023 for nausea vomiting diarrhea and flulike symptoms.  She was diagnosed with COVID 9/14/2023 and was initially home on Paxlovid he had some rectal bleeding and acute kidney injury globin remained stable and they thought the bleeding was due to hemorrhoids and advised her to follow-up with GI upon discharge she went to a skilled nursing facility after discharge.    HPI/ROS  LIMITATION TO HISTORY   Select: : None  OUTSIDE HISTORIAN(S):  none    Mimi Appiah is a 77 y.o. female who presents to the emergency department complaining of diarrhea that she has had for the last 3 days.  She states she has 2-3 episodes of diarrhea daily.  There is no blood in it.  2 days ago she was discharged home from assisted living after being admitted here for COVID.  She states that she is not coughing she is not having fevers or sore throat.  She does not have any abdominal pain.  She feels very weak and shaky.  She has not had any more blood in her stool.  She does have a history of hypertension tobacco use and stroke.  She takes a baby aspirin daily.  She has no dysuria frequency or urgency.  She does not feel short of breath or have chest pain.  Has not taken anything for her diarrhea.    PAST MEDICAL HISTORY   has a past medical history of Abnormal Pap smear, Abnormal Pap smear (04/11/2012), Abnormality of hormone (08/14/2020), Allergic rhinitis, BMI 40.0-44.9, adult (HCC) (07/01/2022), Congestion of nasal sinus (1/7/2020), Encounter for screening for malignant neoplasm of breast (04/11/2012), Eye tearing (10/11/2018), Finger deformity (12/11/2017), Hemorrhoids, HEMORRHOIDS (04/11/2012), Hypertension, LBP (low back pain), Muscle cramps (4/17/2017), Night sweats (7/14/2020), Obesity (11/05/2014), Other specified  "disorders of bone density and structure, multiple sites (10/16/2019), Sequelae, 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    SURGICAL HISTORY   has a past surgical history that includes tubal coagulation laparoscopic bilateral.    FAMILY HISTORY  Family History   Problem Relation Age of Onset    Stroke Father     Alcohol abuse Father     Cancer Sister     Cancer Brother        SOCIAL HISTORY  Social History     Tobacco Use    Smoking status: Some Days     Types: Cigarettes    Smokeless tobacco: Never   Vaping Use    Vaping Use: Never used   Substance and Sexual Activity    Alcohol use: No    Drug use: No    Sexual activity: Not Currently       CURRENT MEDICATIONS  Home Medications       Reviewed by Amarilys Swanson R.N. (Registered Nurse) on 10/04/23 at 0951  Med List Status: <None>     Medication Last Dose Status   acetaminophen (TYLENOL) 500 MG Tab  Active   allopurinol (ZYLOPRIM) 100 MG Tab  Active   aspirin EC (ECOTRIN) 81 MG Tablet Delayed Response  Active   guaiFENesin dextromethorphan (ROBITUSSIN DM) 100-10 MG/5ML Syrup syrup  Active   Nirmatrelvir&Ritonavir 300/100 20 x 150 MG & 10 x 100MG Tablet Therapy Pack  Active   vitamin D3 (CHOLECALCIFEROL) 5000 Unit (125 mcg) Tab  Active                    ALLERGIES  Allergies   Allergen Reactions    Penicillins Swelling     Other reaction(s): head and face swelling    Risperdal [Benzoic Acid-Risperidone]      Other reaction(s): bad reaction all over body    Lima Beans Swelling     Pt gets swollen lips with butter and lima beans.      Peach [Prunus Persica] Nausea     Pt gets nauseous with peaches    Atenolol Swelling     Only at doses above 50mg    Chlorpheniramine        PHYSICAL EXAM  VITAL SIGNS: BP (!) 167/79   Pulse 77   Temp 36.7 °C (98.1 °F) (Temporal)   Resp 19   Ht 1.651 m (5' 5\")   Wt 92.5 kg (204 lb)   SpO2 99%   BMI 33.95 " kg/m²      Constitutional: Well developed, Well nourished, No acute distress, Non-toxic appearance.   HEENT: Normocephalic, Atraumatic,  external ears normal, pharynx pink,  Mucous  Membranes moist, No rhinorrhea or mucosal edema  Eyes: PERRL, EOMI, Conjunctiva normal, No discharge.   Neck: Normal range of motion, No tenderness, Supple, No stridor.   Lymphatic: No lymphadenopathy    Cardiovascular: Regular Rate and Rhythm, No murmurs,  rubs, or gallops.   Thorax & Lungs: Lungs clear to auscultation bilaterally, No respiratory distress, No wheezes, rhales or rhonchi, No chest wall tenderness.   Abdomen: Bowel sounds normal, Soft, non tender, non distended,  No pulsatile masses., no rebound guarding or peritoneal signs.   Skin: Warm, Dry, No erythema, No rash,   Back:  No CVA tenderness,  No spinal tenderness, bony crepitance step offs or instability.   Extremities: Equal, intact distal pulses, No cyanosis, clubbing or edema,  No tenderness.   Musculoskeletal: Good range of motion in all major joints. No tenderness to palpation or major deformities noted.   Neurologic: Alert & oriented No focal deficits noted.  Psychiatric: Affect normal, Judgment normal, Mood normal.      DIAGNOSTIC STUDIES / PROCEDURES  EKG  I have independently interpreted this EKG  See below    LABS  Results for orders placed or performed during the hospital encounter of 10/04/23   CBC WITH DIFFERENTIAL   Result Value Ref Range    WBC 8.1 4.8 - 10.8 K/uL    RBC 4.01 (L) 4.20 - 5.40 M/uL    Hemoglobin 12.8 12.0 - 16.0 g/dL    Hematocrit 38.5 37.0 - 47.0 %    MCV 96.0 81.4 - 97.8 fL    MCH 31.9 27.0 - 33.0 pg    MCHC 33.2 32.2 - 35.5 g/dL    RDW 45.2 35.9 - 50.0 fL    Platelet Count 310 164 - 446 K/uL    MPV 10.2 9.0 - 12.9 fL    Neutrophils-Polys 66.80 44.00 - 72.00 %    Lymphocytes 20.70 (L) 22.00 - 41.00 %    Monocytes 9.10 0.00 - 13.40 %    Eosinophils 2.00 0.00 - 6.90 %    Basophils 0.90 0.00 - 1.80 %    Immature Granulocytes 0.50 0.00 - 0.90 %     Nucleated RBC 0.00 0.00 - 0.20 /100 WBC    Neutrophils (Absolute) 5.41 1.82 - 7.42 K/uL    Lymphs (Absolute) 1.68 1.00 - 4.80 K/uL    Monos (Absolute) 0.74 0.00 - 0.85 K/uL    Eos (Absolute) 0.16 0.00 - 0.51 K/uL    Baso (Absolute) 0.07 0.00 - 0.12 K/uL    Immature Granulocytes (abs) 0.04 0.00 - 0.11 K/uL    NRBC (Absolute) 0.00 K/uL   COMP METABOLIC PANEL   Result Value Ref Range    Sodium 132 (L) 135 - 145 mmol/L    Potassium 4.2 3.6 - 5.5 mmol/L    Chloride 101 96 - 112 mmol/L    Co2 16 (L) 20 - 33 mmol/L    Anion Gap 15.0 7.0 - 16.0    Glucose 85 65 - 99 mg/dL    Bun 25 (H) 8 - 22 mg/dL    Creatinine 2.15 (H) 0.50 - 1.40 mg/dL    Calcium 9.3 8.5 - 10.5 mg/dL    Correct Calcium 10.0 8.5 - 10.5 mg/dL    AST(SGOT) 20 12 - 45 U/L    ALT(SGPT) 16 2 - 50 U/L    Alkaline Phosphatase 125 (H) 30 - 99 U/L    Total Bilirubin 0.5 0.1 - 1.5 mg/dL    Albumin 3.1 (L) 3.2 - 4.9 g/dL    Total Protein 7.4 6.0 - 8.2 g/dL    Globulin 4.3 (H) 1.9 - 3.5 g/dL    A-G Ratio 0.7 g/dL   LIPASE   Result Value Ref Range    Lipase 18 11 - 82 U/L   ESTIMATED GFR   Result Value Ref Range    GFR (CKD-EPI) 23 (A) >60 mL/min/1.73 m 2   EKG (Now)   Result Value Ref Range    Report       [facility] Emergency Dept.    Test Date:  2023-10-04  Pt Name:    EVELYNE SCHULTZ               Department: ER  MRN:        [id number]                      Room:        22  Gender:     Female                       Technician: [id number]  :        [id number]                   Requested By:DIVINE TRUONG  Order #:    [id number]                    Reading MD: DIVIEN TRUONG MD    Measurements  Intervals                                Axis  Rate:       70                           P:          9  CT:         168                          QRS:        -35  QRSD:       97                           T:          -23  QT:         427  QTc:        461    Interpretive Statements  Sinus rhythm  Inferior infarct, age indeterminate  Anteroseptal infarct, age  indeterminate  Compared to ECG 09/17/2023 12:35:11  Atrial premature complex(es) no longer present  Myocardial infarct finding still present  Electronically Signed On 10- 11:19:14 PDT by DIVINE TRUONG MD           RADIOLOGY  I have independently interpreted the diagnostic imaging associated with this visit and am waiting the final reading from the radiologist.   My preliminary interpretation is as follows: pcxr no infiltrate or pleural effusion  Radiologist interpretation:   DX-CHEST-PORTABLE (1 VIEW)   Final Result      No acute cardiac or pulmonary abnormalities are identified.           COURSE & MEDICAL DECISION MAKING    ED Observation Status?  No    INITIAL ASSESSMENT, COURSE AND PLAN  Care Narrative: Mimi Appiah is a 77 y.o. female who presents to the emergency department complaining of diarrhea that she has had for the last 3 days.  She states she has 2-3 episodes of diarrhea daily.  There is no blood in it.  2 days ago she was discharged home from assisted living after being admitted here for COVID.  She states that she is not coughing she is not having fevers or sore throat.  She does not have any abdominal pain.  She feels very weak and shaky.  She has not had any more blood in her stool.  She does have a history of hypertension tobacco use and stroke.  She takes a baby aspirin daily.  She has no dysuria frequency or urgency.  She does not feel short of breath or have chest pain.  Has not taken anything for her diarrhea.  Physical exam she has dry mucous membranes heart is regular rhythm lungs are clear abdomen is elevated BMI soft and nontender without rebound masses or peritoneal signs.  HYDRATION: Based on the patient's presentation of Acute Diarrhea the patient was given IV fluids. IV Hydration was used because oral hydration was not adequate alone. Upon recheck following hydration, the patient was improved.   Differential diagnosis: C. difficile, dehydration, acute kidney injury,  electrolyte disturbance, anemia, cardiac ischemia, post COVID pneumonia  ADDITIONAL PROBLEM LIST  Hypertension  DISPOSITION AND DISCUSSIONS    An IV was started and labs were drawn.  I gave the patient IV fluid for rehydration.  Her CBC has a normal white count of 8.1 hemoglobin 12.8 with a normal differential.  Patient's comprehensive metabolic panel has a low sodium of 132 CO2 is low at 16 BUN elevated 25 creatinine elevated 2.15.  Her alk phos is 125 and her albumin is low at 3.1.  Her GFR is down to 23.  I am awaiting a stool sample to check for C. difficile.  I have not given the patient any antibiotics.  She will be admitted to the hospital for rehydration and management of her acute kidney injury.    Patient's chest x-ray does not show any infiltrate or pleural effusions and her EKG does not show any ischemic changes or arrhythmia.    I have discussed management of the patient with the following physicians and CHEN's: Hospitalist Dr. Manuel will admit the patient for rehydration and further evaluation of her kidneys and work-up for C. difficile    Discussion of management with other Q or appropriate source(s): None     Escalation of care considered, and ultimately not performed: Antibiotics were considered however the patient does not have a C. difficile sample yet and is not febrile    Barriers to care at this time, including but not limited to:  none.     Decision tools and prescription drugs considered including, but not limited to: none.    Patient will be admitted in guarded condition.    FINAL DIAGNOSIS  1. Diarrhea, unspecified type    2. LEFTY (acute kidney injury) (HCC)    3. Dehydration           Electronically signed by: Lea Pedersen M.D., 10/4/2023 9:59 AM

## 2023-10-04 NOTE — H&P
Hospital Medicine History & Physical Note    Date of Service  10/4/2023    Primary Care Physician  ESME Delacruz.    Consultants  none    Code Status  Full Code    Chief Complaint  Chief Complaint   Patient presents with    Diarrhea    Malaise       History of Presenting Illness  Mimi Appiah is a 77 y.o. female PMH CKD 3b, prior strokes, who presented 10/4/2023 with weakness and diarrhea.  Diarrhea started 4 days at [facility] and has continued, no relief, no control.  Associated decreased appetite since having COVID, but able to drink water for fluids. Denied falls, LOC, chest pain, SOB.     labs showed for acute renal failure, creatinine 2.15.  Patient had recent acute renal failure back on 9/17 due to dehydration.  She has ongoing diarrhea, sodium 132, bicarb 16.  She had recent COVID-19 on 9/14/2023.     Was at [facility], went home two days ago.  She lives alone, has no family to help her.  She could not obtain any groceries. Renal Cr baseline is 1.7.  only has seen [facility] nephrology.    I reviewed with pharmacy, patient had Levaquin for 5 days ending on 9/14/2023.  In April she had a prescription for clindamycin and amoxicillin with end date August 2023.      I discussed the plan of care with patient, bedside RN, charge RN, , pharmacy, and EDP .    Review of Systems  Review of Systems   Constitutional:  Positive for malaise/fatigue. Negative for chills and fever.   HENT:  Negative for congestion and hearing loss.    Eyes:  Negative for blurred vision and pain.   Respiratory:  Negative for cough and shortness of breath.    Cardiovascular:  Negative for chest pain and palpitations.   Gastrointestinal:  Positive for diarrhea. Negative for abdominal pain, nausea and vomiting.   Genitourinary:  Negative for dysuria and hematuria.   Musculoskeletal:  Negative for back pain and joint pain.   Skin:  Negative for itching and rash.   Neurological:  Positive for weakness. Negative for  dizziness and headaches.   Psychiatric/Behavioral:  The patient is not nervous/anxious and does not have insomnia.    All other systems reviewed and are negative.      Past Medical History   has a past medical history of Abnormal Pap smear, Abnormal Pap smear (04/11/2012), Abnormality of hormone (08/14/2020), Allergic rhinitis, BMI 40.0-44.9, adult (HCC) (07/01/2022), Congestion of nasal sinus (1/7/2020), Encounter for screening for malignant neoplasm of breast (04/11/2012), Eye tearing (10/11/2018), Finger deformity (12/11/2017), Hemorrhoids, HEMORRHOIDS (04/11/2012), Hypertension, LBP (low back pain), Muscle cramps (4/17/2017), Night sweats (7/14/2020), Obesity (11/05/2014), Other specified disorders of bone density and structure, multiple sites (10/16/2019), Sequelae, 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    Surgical History   has a past surgical history that includes tubal coagulation laparoscopic bilateral.     Family History  family history includes Alcohol abuse in her father; Cancer in her brother and sister; Stroke in her father.   Family history reviewed with patient. There is no family history that is pertinent to the chief complaint.     Social History   reports that she has been smoking cigarettes. She has never used smokeless tobacco. She reports that she does not drink alcohol and does not use drugs.    Allergies  Allergies   Allergen Reactions    Penicillins Swelling     Other reaction(s): head and face swelling    Risperdal [Benzoic Acid-Risperidone]      Other reaction(s): bad reaction all over body    Lima Beans Swelling     Pt gets swollen lips with butter and lima beans.      Peach [Prunus Persica] Nausea     Pt gets nauseous with peaches    Atenolol Swelling     Only at doses above 50mg    Chlorpheniramine        Medications  Prior to Admission Medications   Prescriptions  Last Dose Informant Patient Reported? Taking?   Nirmatrelvir&Ritonavir 300/100 20 x 150 MG & 10 x 100MG Tablet Therapy Pack  Patient No No   Sig: Take 150 mg nirmatrelvir (one 150 mg tablet) with 100 mg  ritonavir (one 100 mg tablet) by mouth, with both tablets taken together  twice daily for 5 days.   acetaminophen (TYLENOL) 500 MG Tab  Patient Yes No   Sig: Take 1,000 mg by mouth every 6 hours as needed for Moderate Pain or Fever.   allopurinol (ZYLOPRIM) 100 MG Tab  Patient No No   Sig: Take 1 Tablet by mouth every day.   aspirin EC (ECOTRIN) 81 MG Tablet Delayed Response  Patient Yes No   Sig: Take 81 mg by mouth every day.   guaiFENesin dextromethorphan (ROBITUSSIN DM) 100-10 MG/5ML Syrup syrup   No No   Sig: Take 10 mL by mouth every 6 hours as needed for Cough.   vitamin D3 (CHOLECALCIFEROL) 5000 Unit (125 mcg) Tab   No No   Sig: Take 1 Tablet by mouth every day.      Facility-Administered Medications: None       Physical Exam  Temp:  [36.7 °C (98.1 °F)] 36.7 °C (98.1 °F)  Pulse:  [70-77] 74  Resp:  [16-19] 16  BP: (158-169)/(74-92) 169/92  SpO2:  [98 %-99 %] 98 %  Blood Pressure : (!) (P) 158/74   Temperature: 36.7 °C (98.1 °F)   Pulse: (P) 70   Respiration: (P) 18   Pulse Oximetry: (P) 99 %       Physical Exam  Vitals and nursing note reviewed.   Constitutional:       General: She is not in acute distress.     Appearance: Normal appearance. She is not ill-appearing.   HENT:      Head: Normocephalic and atraumatic.      Right Ear: External ear normal.      Left Ear: External ear normal.      Mouth/Throat:      Mouth: Mucous membranes are dry.      Pharynx: No oropharyngeal exudate.   Eyes:      General: No scleral icterus.     Extraocular Movements: Extraocular movements intact.   Cardiovascular:      Rate and Rhythm: Normal rate and regular rhythm.      Pulses: Normal pulses.      Heart sounds: Normal heart sounds. No murmur heard.  Pulmonary:      Effort: Pulmonary effort is normal. No respiratory  "distress.      Breath sounds: Normal breath sounds. No wheezing.   Abdominal:      General: Abdomen is flat. Bowel sounds are normal. There is no distension.      Palpations: Abdomen is soft.      Tenderness: There is no abdominal tenderness.   Musculoskeletal:         General: No swelling or tenderness. Normal range of motion.      Cervical back: Normal range of motion. No rigidity.   Skin:     General: Skin is warm.      Capillary Refill: Capillary refill takes less than 2 seconds.      Coloration: Skin is not jaundiced.      Findings: No erythema.   Neurological:      General: No focal deficit present.      Mental Status: She is alert and oriented to person, place, and time. Mental status is at baseline.      Motor: No weakness.   Psychiatric:         Mood and Affect: Mood normal.         Behavior: Behavior normal.         Thought Content: Thought content normal.         Judgment: Judgment normal.         Laboratory:  Recent Labs     10/04/23  0955   WBC 8.1   RBC 4.01*   HEMOGLOBIN 12.8   HEMATOCRIT 38.5   MCV 96.0   MCH 31.9   MCHC 33.2   RDW 45.2   PLATELETCT 310   MPV 10.2     Recent Labs     10/04/23  0955   SODIUM 132*   POTASSIUM 4.2   CHLORIDE 101   CO2 16*   GLUCOSE 85   BUN 25*   CREATININE 2.15*   CALCIUM 9.3     Recent Labs     10/04/23  0955   ALTSGPT 16   ASTSGOT 20   ALKPHOSPHAT 125*   TBILIRUBIN 0.5   LIPASE 18   GLUCOSE 85         No results for input(s): \"NTPROBNP\" in the last 72 hours.      No results for input(s): \"TROPONINT\" in the last 72 hours.    Imaging:  DX-CHEST-PORTABLE (1 VIEW)   Final Result      No acute cardiac or pulmonary abnormalities are identified.      US-RUQ    (Results Pending)       X-Ray:  My impression is: no consolidations  EKG:  I have personally reviewed the images and compared with prior images. and My impression is: HR 76, SR, Qtc 461ms.    Assessment/Plan:  Justification for Admission Status  I anticipate this patient is appropriate for observation status at this " time because monitor renal function and provide IVF. She will need PT/OT evaluation.    Patient will need a Med/Surg bed on MEDICINE service .  The need is secondary to LEFTY.    * Acute renal failure (ARF) (HCC)- (present on admission)  Assessment & Plan  Creatinine 2.1  Baseline appears to be 1.7 with CKD stage IV  - Continue IV fluids  - Patient has had diarrhea, high risk for C. difficile infection  - Rechecking labs in the morning  -I ordered a renal ultrasound, FEUrea (was given IVF in ED already)    Advanced care planning/counseling discussion- (present on admission)  Assessment & Plan  I spent extra time discussing with patient about advanced care planning.    I spent a total of 17 minutes with the patient directly at bedside, and discussed patient's current diagnosis, prognosis and goals of care. I discussed code status, medical interventions, nutritional interventions.  Patient wished to be FULL CODE    Age-related physical debility- (present on admission)  Assessment & Plan  Was at [facility], went home two days ago.  She lives alone, has no family to help her.  She could not obtain any groceries.   - PT/OT eval ordered  - CM consult for placement, pt is agreeable to GH if needed    Diarrhea- (present on admission)  Assessment & Plan  4 days diarrhea, watery, no blood  Started at [facility]  - will check stool studies    I reviewed with pharmacy, patient had Levaquin for 5 days ending on 9/14/2023.  In April she had a prescription for clindamycin and amoxicillin with end date August 2023.  High risk for d difficile diarrhea.  - c difficile pcr pending    Hyponatremia- (present on admission)  Assessment & Plan  Due to dehydration  Continue IV fluids, repeat labs in the morning    Class 1 obesity due to excess calories with serious comorbidity and body mass index (BMI) of 33.0 to 33.9 in adult- (present on admission)  Assessment & Plan  Recommending patient to follow up with their PCP on weight management  plan  Counseled patient on weight control, diet management, following up with PCP    Stage 4 chronic kidney disease (HCC)- (present on admission)  Assessment & Plan  History of  Does not follow with a nephrologist any longer  - Has LEFTY on CKD4    Dyslipidemia- (present on admission)  Assessment & Plan  Not on medications  Total cholesterol 193, , HDL 40, triglycerides 92, 9/11/2023.    Hypertension- (present on admission)  Assessment & Plan  Hx of  Was on carvedilol and amlodipine        VTE prophylaxis: heparin ppx    Total time spent on admission 76 minutes.    This included my review with ER physician, review of ED events, patient's history, outside records, recent records, face to face interview, physical examination; my review of lab results (CBC, chemistry panel), imaging review (CXR) and ECG. Also includes counseling patient on admission, treatment plan, and documentation of encounter.

## 2023-10-04 NOTE — ASSESSMENT & PLAN NOTE
Patient at this time does not not want any invasive procedures or surgery.  Discussed in detail out choledocholithiasis and the risk of infection, increased pain possibility of worsening symptoms leading up to death.  Patient notes that she has lived a good life and does not want to have surgical procedures.  She wants to remain comfortable.  Discussed CODE STATUS with patient, patient does not wish to have invasive procedures to prolong life.  She has agreed with a DNR/DNI status.

## 2023-10-04 NOTE — ASSESSMENT & PLAN NOTE
Was at [facility], went home two days ago.  She lives alone, has no family to help her.  She could not obtain any groceries.  Patient is considering home health does not want to return to [place].  - PT/OT   -Home health

## 2023-10-04 NOTE — ED NOTES
Pt to S1 via pt transport. Pt has all belongings at time of transfer including 4 wheel walker, purse, and cell phone. No belongings left in room after transfer.

## 2023-10-04 NOTE — ED NOTES
Pt assisted to bedside commode for stool sample. Pt only able to produce smears on toilet paper. Pt informed of need for stool sample when able to try again. Pt back in bed and reattached to all monitors. Call light within reach. IVF infusing.

## 2023-10-04 NOTE — ED NOTES
Med rec completed per patient at bedside.  Allergies reviewed with patient.  Patient's preferred pharmacy: [facility] on [address]    On 9/11/2023 patient was prescribed a 5 day course of levofloxacin. Patient states she only took this antibiotic for 1 day and then stopped.  On 9/16/2023 patient was prescribed a 5 day course of PAXLOVID, however she states that she only took it for 2 days and then stopped.

## 2023-10-04 NOTE — ASSESSMENT & PLAN NOTE
4 days diarrhea, watery, no blood. Started at [facility].  Has resolved since admittance.  Low concern for possible C. difficile.  Considering that she may have had a viral gastro intestinal etiology versus choledocholithiasis.

## 2023-10-04 NOTE — ASSESSMENT & PLAN NOTE
History of  Does not follow with a nephrologist any longer. Has shown improvement overnight.   - Has LEFTY on CKD4

## 2023-10-04 NOTE — CARE PLAN
The patient is Stable - Low risk of patient condition declining or worsening    Shift Goals  Clinical Goals: Rest, Monitor I/Os  Patient Goals: Rest  Family Goals: KAYE    Progress made toward(s) clinical / shift goals:        Problem: Communication  Goal: The ability to communicate needs accurately and effectively will improve  10/4/2023 1559 by Annie Collado R.N.  Outcome: Progressing  Flowsheets (Taken 10/4/2023 1559)  Communication:   Assessed patient's ability to understand and communicate   Oriented patient to call light   Reoriented patient to environment  Note:   1. Assess ability to communicate and understand 2. Provide augmentative or alternative methods of communication devices 3. Use /language line as appropriate   10/4/2023 1319 by Annie Collado R.N.  Outcome: Progressing     Problem: Fluid Volume  Goal: Fluid volume balance will be maintained  10/4/2023 1559 by Annie Collado R.N.  Outcome: Progressing  Note:   Monitor intake and output as ordered 2. Promote oral intake as appropriate 3. Report inadequate intake or output to physician 4. Administer IV therapy as ordered 5. Weights per provider order 6. Assess for signs and symptoms of bleeding 7. Monitor for signs of fluid overload (respiratory changes, edema, weight gain, increased abdominal girth) 8. Monitor of signs for inadequate fluid volume (poor skin turgor, dry mucous membranes)   10/4/2023 1319 by Annie Collado R.RAMEZ.  Outcome: Progressing         Patient is not progressing towards the following goals:  NA

## 2023-10-04 NOTE — ASSESSMENT & PLAN NOTE
Show creatinine 2.1, has gone down to 0.73 today almost at baseline.  Baseline appears to be 1.7 with CKD stage IV  - Continue IV fluids  - Diarrhea has resolved probable diarrhea from viral versus, gallstone

## 2023-10-04 NOTE — ED TRIAGE NOTES
Vitals:    10/04/23 0954   BP: (!) 167/79   Pulse: 77   Resp: 19   Temp: 36.7 °C (98.1 °F)   SpO2: 99%     Chief Complaint   Patient presents with    Diarrhea    Malaise     Pt was recently admitted and discharged for COVID and a dx of AAA. She is brought in by EMS for the above complaints for the last few days, also poor oral intake.    She arrives alert and oriented x 4, VSS on room air.

## 2023-10-04 NOTE — ED NOTES
Pt assisted to bedside commode; Only urine produced. Pt back in bed. Call light within reach.     Hospitalist at bedside

## 2023-10-05 ENCOUNTER — HOME HEALTH ADMISSION (OUTPATIENT)
Dept: HOME HEALTH SERVICES | Facility: HOME HEALTHCARE | Age: 78
End: 2023-10-05
Payer: MEDICARE

## 2023-10-05 ENCOUNTER — APPOINTMENT (OUTPATIENT)
Dept: PHYSICAL THERAPY | Facility: REHABILITATION | Age: 78
End: 2023-10-05
Payer: MEDICARE

## 2023-10-05 PROBLEM — Z66 DNR (DO NOT RESUSCITATE): Status: ACTIVE | Noted: 2023-10-05

## 2023-10-05 PROBLEM — K80.50 CHOLEDOCHOLITHIASIS: Status: ACTIVE | Noted: 2023-10-05

## 2023-10-05 PROBLEM — K82.8 PORCELAIN GALLBLADDER: Status: ACTIVE | Noted: 2023-10-05

## 2023-10-05 PROBLEM — N17.0 ACUTE RENAL FAILURE WITH ACUTE TUBULAR NECROSIS SUPERIMPOSED ON STAGE 4 CHRONIC KIDNEY DISEASE (HCC): Status: ACTIVE | Noted: 2023-10-04

## 2023-10-05 PROBLEM — N18.4 ACUTE RENAL FAILURE WITH ACUTE TUBULAR NECROSIS SUPERIMPOSED ON STAGE 4 CHRONIC KIDNEY DISEASE (HCC): Status: ACTIVE | Noted: 2023-10-04

## 2023-10-05 LAB
ALBUMIN SERPL BCP-MCNC: 2.6 G/DL (ref 3.2–4.9)
ALP SERPL-CCNC: 89 U/L (ref 30–99)
ALT SERPL-CCNC: 9 U/L (ref 2–50)
ANION GAP SERPL CALC-SCNC: 10 MMOL/L (ref 7–16)
AST SERPL-CCNC: 13 U/L (ref 12–45)
BILIRUB CONJ SERPL-MCNC: <0.2 MG/DL (ref 0.1–0.5)
BILIRUB INDIRECT SERPL-MCNC: ABNORMAL MG/DL (ref 0–1)
BILIRUB SERPL-MCNC: 0.4 MG/DL (ref 0.1–1.5)
BUN SERPL-MCNC: 19 MG/DL (ref 8–22)
CALCIUM SERPL-MCNC: 9 MG/DL (ref 8.5–10.5)
CHLORIDE SERPL-SCNC: 105 MMOL/L (ref 96–112)
CO2 SERPL-SCNC: 20 MMOL/L (ref 20–33)
CREAT SERPL-MCNC: 1.73 MG/DL (ref 0.5–1.4)
ERYTHROCYTE [DISTWIDTH] IN BLOOD BY AUTOMATED COUNT: 44.7 FL (ref 35.9–50)
GFR SERPLBLD CREATININE-BSD FMLA CKD-EPI: 30 ML/MIN/1.73 M 2
GLUCOSE SERPL-MCNC: 76 MG/DL (ref 65–99)
HCT VFR BLD AUTO: 35.2 % (ref 37–47)
HGB BLD-MCNC: 11.3 G/DL (ref 12–16)
LACTATE SERPL-SCNC: 0.8 MMOL/L (ref 0.5–2)
LACTATE SERPL-SCNC: 0.9 MMOL/L (ref 0.5–2)
LACTATE SERPL-SCNC: 2.4 MMOL/L (ref 0.5–2)
MAGNESIUM SERPL-MCNC: 2.1 MG/DL (ref 1.5–2.5)
MCH RBC QN AUTO: 31.1 PG (ref 27–33)
MCHC RBC AUTO-ENTMCNC: 32.1 G/DL (ref 32.2–35.5)
MCV RBC AUTO: 97 FL (ref 81.4–97.8)
PLATELET # BLD AUTO: 287 K/UL (ref 164–446)
PMV BLD AUTO: 10.4 FL (ref 9–12.9)
POTASSIUM SERPL-SCNC: 3.8 MMOL/L (ref 3.6–5.5)
PROT SERPL-MCNC: 5.7 G/DL (ref 6–8.2)
RBC # BLD AUTO: 3.63 M/UL (ref 4.2–5.4)
SODIUM SERPL-SCNC: 135 MMOL/L (ref 135–145)
WBC # BLD AUTO: 6.4 K/UL (ref 4.8–10.8)

## 2023-10-05 PROCEDURE — 700111 HCHG RX REV CODE 636 W/ 250 OVERRIDE (IP): Mod: UD | Performed by: INTERNAL MEDICINE

## 2023-10-05 PROCEDURE — 99222 1ST HOSP IP/OBS MODERATE 55: CPT | Performed by: NURSE PRACTITIONER

## 2023-10-05 PROCEDURE — 97161 PT EVAL LOW COMPLEX 20 MIN: CPT

## 2023-10-05 PROCEDURE — 97535 SELF CARE MNGMENT TRAINING: CPT

## 2023-10-05 PROCEDURE — 97530 THERAPEUTIC ACTIVITIES: CPT

## 2023-10-05 PROCEDURE — 700105 HCHG RX REV CODE 258: Mod: UD | Performed by: INTERNAL MEDICINE

## 2023-10-05 PROCEDURE — 85027 COMPLETE CBC AUTOMATED: CPT

## 2023-10-05 PROCEDURE — 700111 HCHG RX REV CODE 636 W/ 250 OVERRIDE (IP): Mod: UD

## 2023-10-05 PROCEDURE — 99232 SBSQ HOSP IP/OBS MODERATE 35: CPT | Mod: 25 | Performed by: STUDENT IN AN ORGANIZED HEALTH CARE EDUCATION/TRAINING PROGRAM

## 2023-10-05 PROCEDURE — 96374 THER/PROPH/DIAG INJ IV PUSH: CPT

## 2023-10-05 PROCEDURE — 80048 BASIC METABOLIC PNL TOTAL CA: CPT

## 2023-10-05 PROCEDURE — G0378 HOSPITAL OBSERVATION PER HR: HCPCS

## 2023-10-05 PROCEDURE — 96372 THER/PROPH/DIAG INJ SC/IM: CPT | Mod: XU

## 2023-10-05 PROCEDURE — 80076 HEPATIC FUNCTION PANEL: CPT

## 2023-10-05 PROCEDURE — 83605 ASSAY OF LACTIC ACID: CPT | Mod: 91

## 2023-10-05 PROCEDURE — 99497 ADVNCD CARE PLAN 30 MIN: CPT | Performed by: STUDENT IN AN ORGANIZED HEALTH CARE EDUCATION/TRAINING PROGRAM

## 2023-10-05 PROCEDURE — 83735 ASSAY OF MAGNESIUM: CPT

## 2023-10-05 PROCEDURE — 97165 OT EVAL LOW COMPLEX 30 MIN: CPT

## 2023-10-05 RX ORDER — HYDRALAZINE HYDROCHLORIDE 20 MG/ML
20 INJECTION INTRAMUSCULAR; INTRAVENOUS EVERY 6 HOURS PRN
Status: DISCONTINUED | OUTPATIENT
Start: 2023-10-05 | End: 2023-10-06 | Stop reason: HOSPADM

## 2023-10-05 RX ORDER — POTASSIUM CHLORIDE 20 MEQ/1
20 TABLET, EXTENDED RELEASE ORAL ONCE
Status: DISCONTINUED | OUTPATIENT
Start: 2023-10-05 | End: 2023-10-05

## 2023-10-05 RX ADMIN — SODIUM CHLORIDE: 9 INJECTION, SOLUTION INTRAVENOUS at 00:58

## 2023-10-05 RX ADMIN — HEPARIN SODIUM 5000 UNITS: 5000 INJECTION, SOLUTION INTRAVENOUS; SUBCUTANEOUS at 21:19

## 2023-10-05 RX ADMIN — HEPARIN SODIUM 5000 UNITS: 5000 INJECTION, SOLUTION INTRAVENOUS; SUBCUTANEOUS at 16:11

## 2023-10-05 RX ADMIN — HYDRALAZINE HYDROCHLORIDE 20 MG: 20 INJECTION, SOLUTION INTRAMUSCULAR; INTRAVENOUS at 04:08

## 2023-10-05 RX ADMIN — HEPARIN SODIUM 5000 UNITS: 5000 INJECTION, SOLUTION INTRAVENOUS; SUBCUTANEOUS at 04:08

## 2023-10-05 ASSESSMENT — ENCOUNTER SYMPTOMS
NERVOUS/ANXIOUS: 0
FEVER: 0
BLOOD IN STOOL: 0
DIARRHEA: 1
ABDOMINAL PAIN: 1
FLANK PAIN: 0
VOMITING: 0
WEAKNESS: 1
MUSCULOSKELETAL NEGATIVE: 1
SORE THROAT: 0
MYALGIAS: 1
HEADACHES: 0
EYES NEGATIVE: 1
ABDOMINAL PAIN: 0
DIZZINESS: 0
COUGH: 0
CONSTIPATION: 0
FALLS: 0
NAUSEA: 0
CHILLS: 0
SHORTNESS OF BREATH: 0

## 2023-10-05 ASSESSMENT — LIFESTYLE VARIABLES: SUBSTANCE_ABUSE: 0

## 2023-10-05 ASSESSMENT — COGNITIVE AND FUNCTIONAL STATUS - GENERAL
TOILETING: A LITTLE
PERSONAL GROOMING: A LITTLE
DAILY ACTIVITIY SCORE: 18
STANDING UP FROM CHAIR USING ARMS: A LITTLE
DRESSING REGULAR LOWER BODY CLOTHING: A LITTLE
DRESSING REGULAR UPPER BODY CLOTHING: A LITTLE
MOBILITY SCORE: 21
HELP NEEDED FOR BATHING: A LOT
CLIMB 3 TO 5 STEPS WITH RAILING: A LITTLE
WALKING IN HOSPITAL ROOM: A LITTLE
SUGGESTED CMS G CODE MODIFIER MOBILITY: CJ
SUGGESTED CMS G CODE MODIFIER DAILY ACTIVITY: CK

## 2023-10-05 ASSESSMENT — GAIT ASSESSMENTS
GAIT LEVEL OF ASSIST: STANDBY ASSIST
DISTANCE (FEET): 20
DEVIATION: BRADYKINETIC;SHUFFLED GAIT;OTHER (COMMENT)
ASSISTIVE DEVICE: 4 WHEEL WALKER

## 2023-10-05 ASSESSMENT — PAIN DESCRIPTION - PAIN TYPE: TYPE: ACUTE PAIN

## 2023-10-05 ASSESSMENT — ACTIVITIES OF DAILY LIVING (ADL): TOILETING: INDEPENDENT

## 2023-10-05 NOTE — DISCHARGE PLANNING
Case Management Discharge Planning    Admission Date: 10/4/2023  GMLOS:    ALOS: 0    6-Clicks ADL Score: 16  6-Clicks Mobility Score: 21  PT and/or OT Eval ordered: Yes  Post-acute Referrals Ordered: Yes  Post-acute Choice Obtained: Yes  Has referral(s) been sent to post-acute provider:  Yes      Anticipated Discharge Dispo: Discharge Disposition [facility]    DME Needed: Yes    DME Ordered: Yes Over the toilet commode.    Action(s) Taken: Updated Provider/Nurse on Discharge Plan Accepted by [facility].    Escalations Completed: DME Company    Medically Clear: No    Next Steps: CM will continue to follow for discharge planning needs.     Barriers to Discharge: DME    Is the patient up for discharge tomorrow: No

## 2023-10-05 NOTE — THERAPY
Occupational Therapy   Initial Evaluation     Patient Name: Mimi Appiah  Age:  77 y.o., Sex:  female  Medical Record #: [id number]  Today's Date: 10/5/2023     Precautions  Precautions: Fall Risk  Comments: Contact precautions to rule out C diff; Per RN, patient no longer requires contact precautions    Assessment  Patient is 77 y.o. female admitted for ARF 2/2 dehydration and hyponatremia. PMHx of CKD, HTN, and prior CVAs; per chart recently d/c'd from [facility] 2 days ago. Completed ADLs/txfs with SBA-SPV and functional ambulation w/4WW, but fatigued quickly. Would benefit from continued OOB activity with [facility] staff while in house. Pt lives alone on the first floor of a 2-story apt. Reports she has a disabled dtr who lives around the corner that can help PRN with light IADLs. Pt has another dtr who lives out of town that can help intermittently. Patient will not be actively followed for occupational therapy services at this time, however may be seen if requested by physician for 1 more visit within 30 days to address any discharge or equipment needs.     Plan    DC Equipment Recommendations: Raised Toilet Seat with Arms  Discharge Recommendations: Recommend home health for continued occupational therapy services (As long as family can continue to assist PRN. Pt reports she will refuse post-acute placement.)      Objective     10/05/23 1528   Prior Living Situation   Prior Services Intermittent Physical Support for ADL Per Family   Housing / Facility 2 Story Apartment / Condo  (stays on first floor)   Steps Into Home 0   Steps In Home 0   Bathroom Set up Bathtub / Shower Combination;Shower Chair  (Reports shower chair does not fit)   Equipment Owned Front-Wheel Walker;4-Wheel Walker;Single Point Cane;Power Wheel Chair;Tub / Shower Seat   Lives with - Patient's Self Care Capacity Alone and Unable to Care For Self   Comments Pt lives alone on the first floor of a 2-story apt. Reports she has a disabled dtr who  lives around the corner that can help PRN with light IADLs. Pt has another dtr who lives out of town that can help intermittently.   Prior Level of ADL Function   Self Feeding Independent   Grooming / Hygiene Independent   Bathing Independent   Dressing Independent   Toileting Independent   Prior Level of IADL Function   Medication Management Unable To Determine At This Time   Laundry Unable To Determine At This Time   Kitchen Mobility Unable To Determine At This Time   Finances Unable To Determine At This Time   Home Management Unable To Determine At This Time   Shopping Unable To Determine At This Time   Prior Level Of Mobility Independent With Device in Community;Independent With Device in Home   Comments Pt unable to answer questions thoroughly, see cognition. Reports using in-home care service a couple years ago to assist with IADLS   Precautions   Precautions Fall Risk   Vitals   O2 Delivery Device None - Room Air   Vitals Comments Notable SOB and fatigue ambulating from chair>BR>chair; unable to get read on pulse ox sensor   Pain 0 - 10 Group   Therapist Pain Assessment During Activity;Post Activity Pain Same as Prior to Activity;Nurse Notified   Cognition    Cognition / Consciousness X   Level of Consciousness Alert   Attention Impaired   Comments pleasant and cooperative. Tangential req max redirection; unable to get full PLOF. Good insight into deficits   Passive ROM Upper Body   Passive ROM Upper Body WDL   Active ROM Upper Body   Active ROM Upper Body  WDL   Strength Upper Body   Upper Body Strength  X   Gross Strength Generalized Weakness, Equal Bilaterally.    Comments Reports she is not at baseline.   Coordination Upper Body   Coordination WDL   Balance Assessment   Sitting Balance (Static) Fair +   Sitting Balance (Dynamic) Fair   Standing Balance (Static) Fair   Standing Balance (Dynamic) Fair   Weight Shift Sitting Good   Weight Shift Standing Fair   Comments w/4WW   Bed Mobility    Scooting  Supervised   Comments Found and left up in chair.   ADL Assessment   Eating Supervision   Grooming Supervision;Seated  (Adjusted hat and wiped face)   Lower Body Dressing Supervision  (Socks)   Toileting Supervision   Comments Pt educated on role of OT vs. PT, DME, OT home health services, and importance of continued OOB activity.   Functional Mobility   Sit to Stand Standby Assist   Bed, Chair, Wheelchair Transfer Standby Assist   Toilet Transfers Standby Assist   Transfer Method Stand Step   Mobility w/4WW; chair>toilet>chair   Comments Fatigued quickly and declined washing hands.   Edema / Skin Assessment   Edema / Skin  Not Assessed   Activity Tolerance   Comments Limited by weakness and fatigues quickly in standing.   Education Group   Education Provided Role of Occupational Therapist;Pathology of bedrest   Role of Occupational Therapist Patient Response Patient;Acceptance;Explanation;Verbal Demonstration;Reinforcement Needed   Pathology of Bedrest Patient Response Patient;Acceptance;Explanation;Verbal Demonstration;Reinforcement Needed   Problem List   Problem List Decreased Active Daily Living Skills;Decreased Homemaking Skills;Decreased Upper Extremity Strength Right;Decreased Upper Extremity Strength Left;Decreased Functional Mobility;Decreased Activity Tolerance;Safety Awareness Deficits / Cognition;Impaired Postural Control / Balance
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
## 2023-10-05 NOTE — DISCHARGE PLANNING
Good afternoon,  This referral has been escalated to a Clinical Supervisor for review in order to determine Home Health appropriateness.  This issue will be resolved as quickly as possible, but for any questions feel free to call us at [phone].  Thank you!

## 2023-10-05 NOTE — FACE TO FACE
Face to Face Note  -  Durable Medical Equipment    Jacqueline Fair M.D. - NPI: [id number]  I certify that this patient is under my care and that they had a durable medical equipment(DME)face to face encounter by myself that meets the physician DME face-to-face encounter requirements with this patient on:    Date of encounter:   Patient:                    MRN:                       YOB: 2022  Mimi Appiah  [id number]  1945     The encounter with the patient was in whole, or in part, for the following medical condition, which is the primary reason for durable medical equipment:  Debilitation     I certify that, based on my findings, the following durable medical equipment is medically necessary:    Other DME Equipment - Bedside Commode    My Clinical findings support the need for the above equipment due to:  Frequent Falls

## 2023-10-05 NOTE — PROGRESS NOTES
Daily Progress Note:     Date of Service: 10/5/2023  Primary Team: [facility] IM White Team   Attending: Burton Doll M.D.   Senior Resident: Jacqueline Fair M.D.      Chief Complaint:   Diarrhea    Hospital Course:  Ms. Mimi Appiah is a 77 year old female with a medical history including CKD Stage 3 b, CVA, hypertension, dyslipidemia,  and low back pain.  Patient presented to the ED on 10/4 with weakness and diarrhea. Patient had been at [facility], and developed Diarrhea for four days, prior to presentation. Patient noted decreased appetite after having COVID-19 (9/14). She was recently hospitalized with hypotension, worsening cough, weakness and rectal bleeding on 9/17-9/20. GI bleed was not noted during hospitalization, hemoglobin remained in the 14.1 range.     IN the ED, patient was noted to have an acute kidney failure on her CKD, Creatine of 2.15. Noted possible dehydration from diarrhea. Patient also received Levaquin for five days, ending on 9/14. Patient was admitted for further treatment of LEFTY on CKD, and diarrhea.     Subjective:   Since patient has been admitted, she has not had any diarrheal episodes. Stopped C. Dificle precautions. Patient received an RUQ ultrasound for her acute renal failure. Incidental finding of common bile duct dilation, proximal echogenic focus in the common bile duct likely due to a common bile duct stone. Thickening shadowing of anterior gallbladder wall, compatible with porcelain gallbladder.     She did have some right upper quadrant pain this morning. Concern for need for ERCP or cholecystectomy. Patient relayed to gastroenterology that she does not want any procedures or surgeries at this time.     Consultants/Specialty:  Gastroenterology     Review of Systems:    Review of Systems   Eyes: Negative.    Respiratory:  Negative for cough.    Gastrointestinal:  Positive for abdominal pain (With palpation).   Genitourinary: Negative.    Musculoskeletal: Negative.         Objective:   Physical Exam:   Vitals:   Temp:  [36 °C (96.8 °F)-36.8 °C (98.2 °F)] 36 °C (96.8 °F)  Pulse:  [69-90] 90  Resp:  [16-18] 16  BP: (136-176)/(66-81) 136/76  SpO2:  [96 %-100 %] 96 %    Physical Exam  Constitutional:       Appearance: She is ill-appearing.   HENT:      Head: Normocephalic and atraumatic.      Mouth/Throat:      Mouth: Mucous membranes are dry.   Eyes:      Extraocular Movements: Extraocular movements intact.      Pupils: Pupils are equal, round, and reactive to light.   Cardiovascular:      Rate and Rhythm: Normal rate and regular rhythm.      Pulses: Normal pulses.      Heart sounds: No murmur heard.     No friction rub. No gallop.   Pulmonary:      Effort: Pulmonary effort is normal. No respiratory distress.      Breath sounds: No stridor. No wheezing or rales.   Abdominal:      General: Abdomen is flat. Bowel sounds are normal. There is no distension.      Tenderness: There is abdominal tenderness. There is no guarding or rebound. Positive signs include Hernandez's sign.   Musculoskeletal:         General: No swelling.      Right lower leg: No edema.      Left lower leg: No edema.           Labs:   Recent Labs     10/04/23  0955 10/05/23  0201   WBC 8.1 6.4   RBC 4.01* 3.63*   HEMOGLOBIN 12.8 11.3*   HEMATOCRIT 38.5 35.2*   MCV 96.0 97.0   MCH 31.9 31.1   RDW 45.2 44.7   PLATELETCT 310 287   MPV 10.2 10.4   NEUTSPOLYS 66.80  --    LYMPHOCYTES 20.70*  --    MONOCYTES 9.10  --    EOSINOPHILS 2.00  --    BASOPHILS 0.90  --      Recent Labs     10/04/23  0955 10/05/23  0201   SODIUM 132* 135   POTASSIUM 4.2 3.8   CHLORIDE 101 105   CO2 16* 20   GLUCOSE 85 76   BUN 25* 19   CPKTOTAL 48  --      Recent Labs     10/04/23  0955 10/05/23  0201 10/05/23  0518   ALBUMIN 3.1*  --  2.6*   TBILIRUBIN 0.5  --  0.4   ALKPHOSPHAT 125*  --  89   TOTPROTEIN 7.4  --  5.7*   ALTSGPT 16  --  9   ASTSGOT 20  --  13   CREATININE 2.15* 1.73*  --        Imaging:   US-RUQ   Final Result         1.  Common  bile duct dilatation, proximal echogenic focus in the common bile duct likely common bile duct stone. Appearance suggests changes of biliary ductal obstruction.   2.  Thickened shadowing anterior gallbladder wall, compatible with porcelain gallbladder, appearance raising concern for cholecystitis but indeterminate given porcelain gallbladder.   3.  Fusiform 6.1 cm distal abdominal aortic aneurysm with mural thrombus.   4.  Atherosclerosis   5.  Right pleural effusion      DX-CHEST-PORTABLE (1 VIEW)   Final Result      No acute cardiac or pulmonary abnormalities are identified.      [id number]    (Results Pending)       Assessment and Plan:  * Acute renal failure (ARF) (HCC)- (present on admission)  Assessment & Plan  Show creatinine 2.1, has gone down to 0.73 today almost at baseline.  Baseline appears to be 1.7 with CKD stage IV  - Continue IV fluids  - Diarrhea has resolved probable diarrhea from viral versus, gallstone      Choledocholithiasis  Assessment & Plan  Patient got right upper quadrant ultrasound presumably for evaluation of kidneys.  Noted incidental finding of dilation of common bile duct as well as a porcelain gallbladder.  Patient does not want further work-up or any invasive procedures/surgery.  GI was consulted.  Patient understands the risks involved of not pursuing treatment.  Patient will continue to have supportive care and will be discharged home.  Patient at this time does not have any abdominal pain.    Advanced care planning/counseling discussion- (present on admission)  Assessment & Plan  Patient at this time does not not want any invasive procedures or surgery.  Discussed in detail out choledocholithiasis and the risk of infection, increased pain possibility of worsening symptoms leading up to death.  Patient notes that she has lived a good life and does not want to have surgical procedures.  She wants to remain comfortable.  Discussed CODE STATUS with patient, patient does not wish  to have invasive procedures to prolong life.  She has agreed with a DNR/DNI status.    Age-related physical debility- (present on admission)  Assessment & Plan  Was at [facility], went home two days ago.  She lives alone, has no family to help her.  She could not obtain any groceries.  Patient is considering home health does not want to return to [place].  - PT/OT   -Home health    Diarrhea- (present on admission)  Assessment & Plan  4 days diarrhea, watery, no blood. Started at [facility].  Has resolved since admittance.  Low concern for possible C. difficile.  Considering that she may have had a viral gastro intestinal etiology versus choledocholithiasis.      Hyponatremia- (present on admission)  Assessment & Plan  Due to dehydration, has resolved with fluids.  Has stopped IV fluids.      Class 1 obesity due to excess calories with serious comorbidity and body mass index (BMI) of 33.0 to 33.9 in adult- (present on admission)  Assessment & Plan  Recommending patient to follow up with their PCP on weight management plan  Counseled patient on weight control, diet management, following up with PCP    Stage 4 chronic kidney disease (HCC)- (present on admission)  Assessment & Plan  History of  Does not follow with a nephrologist any longer. Has shown improvement overnight.   - Has LEFTY on CKD4     Dyslipidemia- (present on admission)  Assessment & Plan  Not on medications  Total cholesterol 193, , HDL 40, triglycerides 92, 9/11/2023.    Hypertension- (present on admission)  Assessment & Plan  Hx of  Was on carvedilol and amlodipine, not currently taking

## 2023-10-05 NOTE — DISCHARGE PLANNING
ATTN: Case Management  RE: Referral for Home Health    As of 10/5/23, we have accepted the Home Health referral for the patient listed above.    A [facility] clinician will be out to see the patient within 48 hours. If you have any questions or concerns regarding the patient’s transition to Home Health, please do not hesitate to contact us at [phone].      We look forward to collaborating with you,  [facility] Home Health Team

## 2023-10-05 NOTE — PROGRESS NOTES
Report received from night shift RN, bedside report completed. Pt is resting in bed and all needs are met at this time. Lactic resulted at 1200 and it was 2.2, notified MD. Will continue to monitor. The bed is locked/lowered, bed alarm is on, and the call light is within reach.

## 2023-10-05 NOTE — THERAPY
Physical Therapy   Initial Evaluation     Patient Name: Mimi Appiah  Age:  77 y.o., Sex:  female  Medical Record #: [id number]  Today's Date: 10/5/2023     Precautions  Precautions: Fall Risk;Other (See Comments)  Comments: Contact precautions to rule out C diff; Per RN, patient no longer requires contact precautions    Assessment  Patient is 77 y.o. female who presented 10/4/2023 with weakness and diarrhea.    Found to have acute renal failure, hyponatremia.  PMH CKD, prior strokes, HTN, low back pain, obesity, DLP     Patient seen for PT evaluation and treatment. Patient was seated in the chair, agreeable for the session. Patient currently has generalized deconditioning and limited activity tolerance. Patient was able to ambulate in the room & bathroom with 4WW with steady gait and no loss of balance. Anticipate patient will be able to manage at home with mobility. Will continue to benefit from PT services to address mobility & Recommend  PT and additional caregiver support upon DC to address in home mobility and return to community as able.     Plan    Physical Therapy Initial Treatment Plan   Treatment Plan : Gait Training, Neuro Re-Education / Balance, Therapeutic Activities, Therapeutic Exercise  Treatment Frequency: 2 Times per Week  Duration: Until Therapy Goals Met    DC Equipment Recommendations: None  Discharge Recommendations: Recommend home health for continued physical therapy services     Objective       10/05/23 0957   Charge Group   PT Evaluation PT Evaluation Low   Total Time Spent   PT Total Time Yes   PT Evaluation Time Spent (Mins) 17   PT Therapeutic Activities Time Spent (Mins) 10   PT Total Time Spent (Calculated) 27   Initial Contact Note    Initial Contact Note Order Received and Verified, Physical Therapy Evaluation in Progress with Full Report to Follow.   Precautions   Precautions Fall Risk;Other (See Comments)   Comments Contact precautions to rule out C diff; Per RN, patient no  longer requires contact precautions   Vitals   O2 Delivery Device None - Room Air   Pain   Pain Scales 0 to 10 Scale    Intervention Declines   Pain 0 - 10 Group   Therapist Pain Assessment Prior to Activity;During Activity;Post Activity;0   Prior Living Situation   Prior Services None   Housing / Facility 1 Story Apartment / Condo  (1st floor)   Steps Into Home 0   Steps In Home 0   Equipment Owned 4-Wheel Walker;Front-Wheel Walker;Single Point Cane;Wheelchair   Lives with - Patient's Self Care Capacity Alone and Unable to Care For Self   Comments Patient was recently DC from [facility] about 3 days ago. She was having increased difficulty to manage at home due to weakness. She reported not being able to ambulate to the bathroom, prepare meals or make her bed. She did not reach out to her family for assistance.   Prior Level of Functional Mobility   Bed Mobility Independent   Transfer Status Independent   Ambulation Independent   Ambulation Distance Household   Assistive Devices Used 4-Wheel Walker   Comments Patient uses SPC in narrow spaces inside her apartment, she has 3 SPC that she has placed as needed around her house. She uses 4WW in wide spaces like living room.   History of Falls   History of Falls No   Cognition    Level of Consciousness Alert   Passive ROM Upper Body   Comments Please refer to OT notes for upper body assessment   Passive ROM Lower Body   Passive ROM Lower Body WDL   Active ROM Lower Body    Active ROM Lower Body  WDL   Strength Lower Body   Lower Body Strength  X   Gross Strength Generalized Weakness, Equal Bilaterally   Comments Grossly BLE 3+/5 to 4/5   Other Treatments   Other Treatments Provided Patient was educated about mobility, OOB to chair for meals, activity pacing. Discussed safety precautions during functional mobility. Discussed DC recommendations-patient agreeable to stay at her daughter's place on DC, or seek help from her for IADLs as needed.   Balance Assessment    Sitting Balance (Static) Good   Sitting Balance (Dynamic) Fair +   Standing Balance (Static) Fair   Standing Balance (Dynamic) Fair   Comments With 4WW in standing   Bed Mobility    Supine to Sit Supervised   Sit to Supine Supervised   Scooting Supervised   Comments HOB flat; increased time & effort   Gait Analysis   Gait Level Of Assist Standby Assist   Assistive Device 4 Wheel Walker   Distance (Feet) 20   # of Times Distance was Traveled 3   Deviation Bradykinetic;Shuffled Gait;Other (Comment)  (Asymmetrical reduced step & stride length)   Comments Patient able to ambulate in the room with steady gait & no loss of balance   Functional Mobility   Sit to Stand Standby Assist   Bed, Chair, Wheelchair Transfer Standby Assist   Toilet Transfers Standby Assist   Transfer Method Stand Step   Mobility Chair-toilet, bed-chair   Comments With 4WW; cues for hand placement, LE placement, sequencing, pacing. Patient able to ambulate to the bathroom, able to wipe self, don/doff pull ups, perform hand hygiene by the sink without loss of balance.   How much difficulty does the patient currently have...   Turning over in bed (including adjusting bedclothes, sheets and blankets)? 4   Sitting down on and standing up from a chair with arms (e.g., wheelchair, bedside commode, etc.) 4   Moving from lying on back to sitting on the side of the bed? 4   How much help from another person does the patient currently need...   Moving to and from a bed to a chair (including a wheelchair)? 3   Need to walk in a hospital room? 3   Climbing 3-5 steps with a railing? 3   6 clicks Mobility Score 21   Activity Tolerance   Sitting in Chair Post session   Patient / Family Goals    Patient / Family Goal #1 To return home   Short Term Goals    Short Term Goal # 1 Patient will perform sit-stand & chair transfers with 4WW with supervision in 6 visits   Short Term Goal # 2 Patient will ambulate in the hallway >100 feet with 4WW with supervision in 6  visits   Education Group   Education Provided Role of Physical Therapist   Role of Physical Therapist Patient Response Patient;Acceptance;Explanation;Demonstration;Verbal Demonstration;Action Demonstration   Physical Therapy Initial Treatment Plan    Treatment Plan  Gait Training;Neuro Re-Education / Balance;Therapeutic Activities;Therapeutic Exercise   Treatment Frequency 2 Times per Week   Duration Until Therapy Goals Met   Problem List    Problems Impaired Transfers;Impaired Ambulation;Decreased Activity Tolerance   Anticipated Discharge Equipment and Recommendations   DC Equipment Recommendations None   Discharge Recommendations Recommend home health for continued physical therapy services   Interdisciplinary Plan of Care Collaboration   IDT Collaboration with  Nursing   Patient Position at End of Therapy Seated;Chair Alarm On;Call Light within Reach;Tray Table within Reach;Phone within Reach  (RN at bedside)   Session Information   Date / Session Number  10/5-1(1/2, 10/11)

## 2023-10-05 NOTE — CONSULTS
Date of Consultation:  10/5/2023    Patient: : Mimi Appiah  MRN: [id number]    Referring Physician: Jacqueline Fair MD     GI:ROCIO Miner     Reason for Consultation: ? Common bile duct obstruction    History of Present Illness:     This is a 77-year-old female with a past medical history including hypertension, chronic kidney disease stage IIIb, recent COVID-19 infection (9/17/2023), obesity, gout, tobacco use, multiple previous CVAs with last 1 being a in 2013, vertebral artery occlusion who presented to [facility] on 10/4/2023 for diarrhea and malaise.  Patient recently admitted 9/17/2023-9/20/2023 for COVID infection.  She was subsequently discharged to [facility] and was discharged home 10/2/2023..  She presented back to the emergency department with weakness and diarrhea.  Initial labs palpation to be in acute renal failure with a creatinine of 2.15 (baseline 1.7).  During admission, patient had incidental finding of common bile duct dilation, proximal echogenic focus in the common bile duct likely common bile duct stones on right upper quadrant ultrasound.  There were additional findings including porcelain gallbladder raising concern for cholecystitis, fusiform 6.1 cm distal abdominal aortic aneurysm with mural thrombus, right pleural effusion, right atherosclerosis.  No leukocytosis.  LFTs normal AST 20, ALT 16, alk phos mildly elevated at 125, total bilirubin 0.5.  Repeat labs on day of consultation showed AST 13, ALT 9, alk phos 89, total bilirubin 0.4.  Patient had told other providers of right upper quadrant pain but during this encounter, patient denies any abdominal pain, nausea, vomiting, odynophagia, reflux, fever, chills, melena, hematochezia changes in bowel habits or color, or postprandial pain      Tobacco use: Occasional cigarette use  Alcohol use: Denies  Illicit drug use: Denies    Last EGD: Denies previous  Last colonoscopy: Unknown  NSAID/ASA use:  Denies  Anticoagulation use: Denies      Past Medical History:   Diagnosis Date    BMI 40.0-44.9, adult (HCC) 2022    Sequelae, post-stroke 2022    Abnormality of hormone 2020    Night sweats 2020    Sinusitis 2020    Congestion of nasal sinus 2020    Other specified disorders of bone density and structure, multiple sites 10/16/2019    Eye tearing 10/11/2018    Unspecified fall, initial encounter 2018    Finger deformity 2017    Muscle cramps 2017    Standard chest x-ray abnormal 10/04/2016    Obesity 2014    Encounter for screening for malignant neoplasm of breast 2012    Preventative Health (Women): Tetanus - ? Pneumovax -   Flu shot - 10/12 Stool -   Pap Smear - , precancer of cervix  - treated with cryo.  Decline further Mammogram - none Breast Exam - none Dexa Scan - none Colonscopy - , was done [facility].  Clinic was closed and MR were lost Lipid Panel - 13 CBC - 12 CMP - 13 TSH - 12 Vitamin D - 12 Alb/cr ratio  - 13    Abnormal Pap smear 2012    1970 precancerous lesions treated with cyro    HEMORRHOIDS 2012    Abnormal Pap smear     Allergic rhinitis     Hemorrhoids     Hypertension     LBP (low back pain)     Stroke (HCC)          Past Surgical History:   Procedure Laterality Date    TUBAL COAGULATION LAPAROSCOPIC BILATERAL         Family History   Problem Relation Age of Onset    Stroke Father     Alcohol abuse Father     Cancer Sister     Cancer Brother        Social History     Socioeconomic History    Marital status:    Tobacco Use    Smoking status: Some Days     Types: Cigarettes    Smokeless tobacco: Never   Vaping Use    Vaping Use: Never used   Substance and Sexual Activity    Alcohol use: No    Drug use: No    Sexual activity: Not Currently   Social History Narrative    Lives alone, 7 children and 41 grandchildren. States one son  at 19 in car accident. Retired CNA      States her daughter Alisa is her neighbor.     Social Determinants of Health     Financial Resource Strain: High Risk (2/8/2023)    Overall Financial Resource Strain (CARDIA)     Difficulty of Paying Living Expenses: Hard   Food Insecurity: No Food Insecurity (2/8/2023)    Hunger Vital Sign     Worried About Running Out of Food in the Last Year: Never true     Ran Out of Food in the Last Year: Never true   Transportation Needs: No Transportation Needs (2/8/2023)    PRAPARE - Transportation     Lack of Transportation (Medical): No     Lack of Transportation (Non-Medical): No   Physical Activity: Insufficiently Active (2/8/2023)    Exercise Vital Sign     Days of Exercise per Week: 3 days     Minutes of Exercise per Session: 20 min   Stress: No Stress Concern Present (2/8/2023)    [employer] - Occupational Stress Questionnaire     Feeling of Stress : Only a little   Housing Stability: Unknown (2/8/2023)    Housing Stability Vital Sign     Unable to Pay for Housing in the Last Year: No     Unstable Housing in the Last Year: No       Review of systems:  Review of Systems   Constitutional:  Negative for chills, fever and malaise/fatigue.   HENT:  Negative for hearing loss and sore throat.    Respiratory:  Negative for cough and shortness of breath.    Cardiovascular:  Negative for chest pain and leg swelling.   Gastrointestinal:  Positive for diarrhea. Negative for abdominal pain, blood in stool, constipation, melena, nausea and vomiting.   Genitourinary:  Negative for dysuria and flank pain.   Musculoskeletal:  Positive for myalgias. Negative for falls.   Skin:  Negative for itching and rash.   Neurological:  Positive for weakness. Negative for dizziness and headaches.   Psychiatric/Behavioral:  Negative for substance abuse. The patient is not nervous/anxious.    All other systems reviewed and are negative.        Physical Exam:  Vitals:    10/04/23 2236 10/05/23 0314 10/05/23 0894  10/05/23 0658   BP:  (!) 176/81 (!) 140/66 138/76   Pulse: 73 69  87   Resp: 17 17  18   Temp: 36.4 °C (97.6 °F) 36.8 °C (98.2 °F)  36.2 °C (97.1 °F)   TempSrc: Temporal Temporal  Temporal   SpO2: 100% 100%  98%   Weight:       Height:           Physical Exam  Vitals and nursing note reviewed.   Constitutional:       General: She is awake. She is not in acute distress.     Appearance: She is obese.   HENT:      Head: Normocephalic.      Nose: Nose normal. No congestion.      Mouth/Throat:      Mouth: Mucous membranes are moist.      Pharynx: Oropharynx is clear. No oropharyngeal exudate.   Eyes:      General: No scleral icterus.     Extraocular Movements: Extraocular movements intact.      Conjunctiva/sclera: Conjunctivae normal.   Cardiovascular:      Rate and Rhythm: Normal rate and regular rhythm.      Pulses: Normal pulses.      Heart sounds: Normal heart sounds.   Pulmonary:      Effort: Pulmonary effort is normal. No respiratory distress.      Breath sounds: Normal breath sounds. No wheezing.   Abdominal:      General: Abdomen is flat. Bowel sounds are normal. There is no distension.      Palpations: Abdomen is soft.      Tenderness: There is no abdominal tenderness. There is no guarding.   Musculoskeletal:      Right lower leg: No edema.      Left lower leg: No edema.   Skin:     General: Skin is warm and dry.      Capillary Refill: Capillary refill takes less than 2 seconds.      Coloration: Skin is not jaundiced or pale.   Neurological:      General: No focal deficit present.      Mental Status: She is alert and oriented to person, place, and time. Mental status is at baseline.      Motor: No weakness.   Psychiatric:         Mood and Affect: Mood normal.         Behavior: Behavior normal. Behavior is cooperative.         Thought Content: Thought content normal.         Judgment: Judgment normal.           Labs:  Recent Labs     10/04/23  0955 10/05/23  0201   WBC 8.1 6.4   RBC 4.01* 3.63*   HEMOGLOBIN 12.8  11.3*   HEMATOCRIT 38.5 35.2*   MCV 96.0 97.0   MCH 31.9 31.1   MCHC 33.2 32.1*   RDW 45.2 44.7   PLATELETCT 310 287   MPV 10.2 10.4     Recent Labs     10/04/23  0955 10/05/23  0201   SODIUM 132* 135   POTASSIUM 4.2 3.8   CHLORIDE 101 105   CO2 16* 20   GLUCOSE 85 76   BUN 25* 19   CPKTOTAL 48  --            Recent Labs     10/04/23  0955 10/05/23  0518   ASTSGOT 20 13   ALTSGPT 16 9   TBILIRUBIN 0.5 0.4   IBILIRUBIN  --  see below   DBILIRUBIN  --  <0.2   ALKPHOSPHAT 125* 89   GLOBULIN 4.3*  --          Imaging:  US-RUQ  Narrative:   10/4/2023 7:57 PM    HISTORY/REASON FOR EXAM:  Abnormal Labs; recurrent LEFTY.  Abdominal pain    TECHNIQUE/EXAM DESCRIPTION AND NUMBER OF VIEWS:  Real-time sonography of the liver and biliary tree.    COMPARISON: None    FINDINGS:    The liver is normal in contour. There is no evidence of solid mass lesion. The liver measures 14.56 cm.    Shadowing gallstones are seen. There is shadowing from the gallbladder wall which appears thickened measuring 6.2 mm.    The common duct measures 7.40 mm. There is 4.0 mm echogenic focus seen in the proximal common bile duct.    The visualized pancreas is unremarkable.    Fusiform dilatation of the distal abdominal aorta is seen measuring 6.1 cm with mural thrombus. Echogenic atherosclerotic plaque is seen. Intrahepatic IVC is patent.    The portal vein is patent with hepatopetal flow. The MPV measures 0.73 cm cm.    The right kidney measures 8.24 cm.    There is no ascites. Right pleural effusion is seen.  Impression: 1.  Common bile duct dilatation, proximal echogenic focus in the common bile duct likely common bile duct stone. Appearance suggests changes of biliary ductal obstruction.  2.  Thickened shadowing anterior gallbladder wall, compatible with porcelain gallbladder, appearance raising concern for cholecystitis but indeterminate given porcelain gallbladder.  3.  Fusiform 6.1 cm distal abdominal aortic aneurysm with mural thrombus.  4.   "Atherosclerosis  5.  Right pleural effusion            Impressions:  ?Biliary obstruction  Acute on chronic kidney disease stage IIIb  Porcelain gallbladder  Recent COVID infection-9/17/2023  Obesity  Gout  Tobacco use  History of multiple previous CVAs (last CVA 2013)  History of vertebral artery occlusion      MDM:  This is a pleasant 77-year-old female with a past medical history as listed above who presented to [facility] on 10/4/2023 with weakness, malaise, diarrhea.  She was admitted with acute renal failure.  Incidental finding of possible biliary obstruction and porcelain gallbladder on right upper quadrant ultrasound.  LFTs and total bilirubin normal, no leukocytosis, patient afebrile.  MRCP pending per primary team.  Finding of possible biliary obstruction and porcelain gallbladder discussed with patient.  Patient immediately states that she is not interested in any procedures or surgeries as she has \"lived a long, good life 10 times over\".  Discussed porcelain gallbladder runs increased risk of developing cancer and that biliary obstructions run a significant high risk for developing infection, ascending cholangitis, sepsis and if untreated possibility of death.  Patient verbalized understanding and reaffirms that she is not interested in any procedures or surgeries even if it would lead to death.  Patient with full capacity during discussion.  She furthermore elaborated [place] including 9 children, 50 grandchildren, 28 great-grandchildren and that she would like to enjoy the remainder of her life at home and with family.  She states majority of her family does not live locally, many are in [place].  But she does state that she has family member that is a next-door neighbor that helps her with meals.  Expressed acknowledgment and respect toward patient's informed decision and wishes.  Notify patient that should she change her mind and request GI services, GI " would be available to reconsult.  Patient thankful for time and verbalized understanding.    Recommendations:  Diet per primary team  MRCP pending per primary team-Per patient, regardless of findings, she is not interested in procedures or surgeries.  Monitor LFTs or for signs of worsening condition.     No further inventions from acute GI team.  GI to sign off.  Please reconsult for any further questions or concerns.    Discussed with patient, Dr. Fair, Dr. Jasmine.        This note was generated using voice recognition software which has a small chance of producing errors of grammar and possibly content. I have made every reasonable attempt to find and correct any obvious errors, but expect that some may not be found prior to finalization of this note.  Oh my God

## 2023-10-05 NOTE — DIETARY
"Nutrition services: Day 0 of admit.  Mimi Appiah is a 77 y.o. female with admitting DX of Acute renal failure (ARF)     Consult received for Malnutrition Screening Tool (MST): 2 (states has not lost weight, though when asked how much weight pt may have lost states \"unsure\")    RD able to visit pt at bedside, pt seated in chair during encounter. Pt states has not had a strong appetite lately, states will be good then bad. States did remember enjoying lunch from yesterday. Pt reports significant and unintentional weight loss. She describes this as 40 lbs in the past few months. Pt states weight loss likely related to changes in her dentures which delayed and caused obstacles in eating. Denies need for modifications to food provided in hospital. Pt declines any snacks or additions to meal tray at this time. Denies any additional concerns or questions for RD.     Assessment:  Height: 165.1 cm (5' 5\")  Weight: 90.7 kg (200 lb)  Body mass index is 33.28 kg/m²., BMI classification: Obesity Class I   Diet/Intake: Renal     Evaluation:   Hx of age-related physical debility, class I obesity due to excess calories, Stage 4 CKD, dyslipidemia, diarrhea  Pt intake PTA variable per pt. Per ADLs, pt consuming % for breakfast meal documented thus far. Per previous admission 9/17/23-9/20/23, appears pt was also consuming % per meals documented during that admission. Pt does not indicate poor PO per MST screening tool.   Per chart review, weight hx with unknown measurement sources  Wt Readings from Last 4 Encounters:   10/04/23 90.7 kg (200 lb)   09/19/23 92.6 kg (204 lb 2.3 oz)   09/14/23 99.2 kg (218 lb 11.1 oz)   09/11/23 98 kg (216 lb)   Potential 8.5 kg/ 8.5% loss within past 3 weeks, which is considered a severe loss if accurate.   Per nutrition focused physical exam, pt presents with nourished clavicles and shoulder regions. Pt does present with potential moderate muscle wasting at calves as evidenced by " loose hanging skin observed through palpation and less curvature overall. No peripheral edema observed  Skin: no pressure injuries documented, no peripheral edema documented.   Pertinent labs: creatinine 1.73, eGFR 30  Pertinent meds: heparin injection, and continuous NS infusion   Last BM 10/5-small     Malnutrition Risk: Pt meets criteria for severe acute malnutrition as evidenced by acute renal failure as evidenced by 8.5% severe wt loss within past 3 weeks as well as moderate muscle wasting observed at bilateral calves.     Problem: Nutritional:  Goal: Achieve adequate nutritional intake    Recommendations/Plan:  Patient will consume at least 50%  or more for meals provided  Encourage intake of meals  Document intake of all meals and/or supplements as % taken in ADL's to provide interdisciplinary communication across all shifts.   Monitor weight.  Nutrition rep will continue to see patient for ongoing meal and snack preferences.     RD following

## 2023-10-05 NOTE — CARE PLAN
Problem: Mobility  Goal: Patient's capacity to carry out activities will improve  Outcome: Progressing  Flowsheets (Taken 10/5/2023 0216)  Mobility:   Encouraged mobilization per interdisciplinary team recommendations   Monitored for signs of activity intolerance   Provided assistive devices   Provided rest periods between activities   Administered pain management to allow progressive mobilization   Collaborated with PT/OT  Note: Pt has been able to get mobilization in throughout the shift by ambulating to the restroom and back into bed. The pt uses their own walker from home but the brakes do not work, PT eval was ordered. Pt ambulates well and tolerates well, standby assist is only needed. Will continue to monitor for changes.      Problem: Fall Risk  Goal: Patient will remain free from falls  Outcome: Progressing  Note: Pt has remained free from falls throughout the shift. Pt has been educated on risk factors such as increased age, limited mobility, electrolyte imbalances, devices connected to the pt, and medications. Pt verbalizes understanding. The bed is locked/lowered, bed alarm is activated, frequent rounding, and the call light is within reach. Will continue to monitor.     The patient is Stable - Low risk of patient condition declining or worsening    Shift Goals  Clinical Goals: Pain labs/vitals, safety, obtain stool sample  Patient Goals: sleep and figure out where to live  Family Goals: KAYE    Progress made toward(s) clinical / shift goals:  Pt has remained free from falls and been able to ambulate in the room by the EOS.

## 2023-10-05 NOTE — DISCHARGE PLANNING
Received Choice form @: 5500  Agency/Facility Name: [facility]  Referral sent per Choice form @: Not sent, placed in media     Received Choice form @: 1893  Agency/Facility Name: [facility]   Referral sent per Choice form @: Not sent, need order

## 2023-10-05 NOTE — DISCHARGE PLANNING
Case Management Discharge Planning    Admission Date: 10/4/2023  GMLOS:    ALOS: 0    6-Clicks ADL Score: 16  6-Clicks Mobility Score: 16  PT and/or OT Eval ordered: Yes  Post-acute Referrals Ordered: NA  Post-acute Choice Obtained: NA  Has referral(s) been sent to post-acute provider:  ALBERTO      Anticipated Discharge Dispo: Discharge Disposition: D/T to SNF with Medicare cert in anticipation of skilled care (03)    DME Needed: No    Action(s) Taken: Updated Provider/Nurse on Discharge Plan, Patient Conference, and DC Assessment Complete (See below)    Escalations Completed: None    Medically Clear: No    Barriers to Discharge: Medical clearance and Transportation

## 2023-10-05 NOTE — CARE PLAN
The patient is Stable - Low risk of patient condition declining or worsening    Pt was agreeable and pleasant throughout the day. Pt free of falls throughout the day, with no complications. Although she did display some anxiety relating previous NPO status related to MRI and possible surgery rule out. Provided education related to plan of care and medication administration. Pt was able to get up to the bathroom with a FWW and one assist standby.     Shift Goals  Clinical Goals: pain, safety, manage anxiety  Patient Goals: to get warm  Family Goals: KAYE    Progress made toward(s) clinical / shift goals:    Problem: Knowledge Deficit - Standard  Goal: Patient and family/care givers will demonstrate understanding of plan of care, disease process/condition, diagnostic tests and medications  Outcome: Progressing     Problem: Skin Integrity  Goal: Skin integrity is maintained or improved  Outcome: Progressing     Problem: Psychosocial  Goal: Patient's level of anxiety will decrease  Outcome: Progressing  Goal: Patient's ability to verbalize feelings about condition will improve  Outcome: Progressing  Goal: Patient's ability to re-evaluate and adapt role responsibilities will improve  Outcome: Progressing  Goal: Patient and family will demonstrate ability to cope with life altering diagnosis and/or procedure  Outcome: Progressing  Goal: Spiritual and cultural needs incorporated into hospitalization  Outcome: Progressing     Problem: Communication  Goal: The ability to communicate needs accurately and effectively will improve  Outcome: Progressing     Problem: Hemodynamics  Goal: Patient's hemodynamics, fluid balance and neurologic status will be stable or improve  Outcome: Progressing     Problem: Respiratory  Goal: Patient will achieve/maintain optimum respiratory ventilation and gas exchange  Outcome: Progressing       Patient is not progressing towards the following goals:

## 2023-10-05 NOTE — DISCHARGE PLANNING
HTH/SCP TCN chart review completed. Collaborated with CMs [place] and Norm. The most current review of medical record, knowledge of pt's PLOF and social support, LACE+ score of 65, 6 clicks scores of 21 mobility were considered.      Pt seen at bedside. Introduced TCN program. Provided education regarding post acute levels of care. Discussed HTH/SCP plan benefits (Meds to Beds, medical uber and [facility] transitional care). Pt verbalizes understanding.     Note that this TCN discussed this pt with TCN from pt's most recent SNF ([facility]) setting as well, as pt recently dc'd from this SNF to home 2' to high level of function. Note that per review, pt has upcoming outpatient PT appointments scheduled. Relayed this information to CM as well. Note that pt is giving somewhat contradictory reports with re: dc planning. When discussing dc planning with TCN she reports she would currently decline post acute placement and would only dc to home at this time. She is however agreeable to HH services. Advised pt that she does have PT and OT consults in place to assist with dc planning recs as well and to discuss her dc plan with therapists to assist with appropriate DME, etc for dc planning. She reports having appropriate DME for dc to home plan though does report she would likely need some degree of transportation home. TCN relayed this information to CM as well.    TCN will monitor for PT and OT consults to assist with dc planning. Given aforementioned, choice proactively obtained for HH, faxed to [facility] and given to CM. TCN will continue to follow and collaborate with discharge planning team as additional post acute needs arise. Thank you.     Completed today:  PT/OT consults in place  Choice obtained: HH*  SCP with [facility]    *noted in PM that PT is recommending HH

## 2023-10-06 ENCOUNTER — APPOINTMENT (OUTPATIENT)
Dept: CARDIOLOGY | Facility: MEDICAL CENTER | Age: 78
End: 2023-10-06
Payer: MEDICARE

## 2023-10-06 ENCOUNTER — APPOINTMENT (OUTPATIENT)
Dept: RADIOLOGY | Facility: MEDICAL CENTER | Age: 78
End: 2023-10-06
Payer: MEDICARE

## 2023-10-06 VITALS
TEMPERATURE: 97.3 F | OXYGEN SATURATION: 99 % | HEIGHT: 65 IN | SYSTOLIC BLOOD PRESSURE: 136 MMHG | DIASTOLIC BLOOD PRESSURE: 84 MMHG | RESPIRATION RATE: 18 BRPM | HEART RATE: 83 BPM | WEIGHT: 200 LBS | BODY MASS INDEX: 33.32 KG/M2

## 2023-10-06 PROBLEM — E87.1 HYPONATREMIA: Status: RESOLVED | Noted: 2023-09-17 | Resolved: 2023-10-06

## 2023-10-06 PROBLEM — N18.4 ACUTE RENAL FAILURE WITH ACUTE TUBULAR NECROSIS SUPERIMPOSED ON STAGE 4 CHRONIC KIDNEY DISEASE (HCC): Status: RESOLVED | Noted: 2023-10-04 | Resolved: 2023-10-06

## 2023-10-06 PROBLEM — N17.0 ACUTE RENAL FAILURE WITH ACUTE TUBULAR NECROSIS SUPERIMPOSED ON STAGE 4 CHRONIC KIDNEY DISEASE (HCC): Status: RESOLVED | Noted: 2023-10-04 | Resolved: 2023-10-06

## 2023-10-06 PROBLEM — R19.7 DIARRHEA: Status: RESOLVED | Noted: 2023-10-04 | Resolved: 2023-10-06

## 2023-10-06 LAB
ANION GAP SERPL CALC-SCNC: 11 MMOL/L (ref 7–16)
BASOPHILS # BLD AUTO: 0.8 % (ref 0–1.8)
BASOPHILS # BLD: 0.05 K/UL (ref 0–0.12)
BUN SERPL-MCNC: 23 MG/DL (ref 8–22)
CALCIUM SERPL-MCNC: 8.8 MG/DL (ref 8.5–10.5)
CHLORIDE SERPL-SCNC: 106 MMOL/L (ref 96–112)
CO2 SERPL-SCNC: 18 MMOL/L (ref 20–33)
CREAT SERPL-MCNC: 1.61 MG/DL (ref 0.5–1.4)
EOSINOPHIL # BLD AUTO: 0.18 K/UL (ref 0–0.51)
EOSINOPHIL NFR BLD: 2.7 % (ref 0–6.9)
ERYTHROCYTE [DISTWIDTH] IN BLOOD BY AUTOMATED COUNT: 46 FL (ref 35.9–50)
GFR SERPLBLD CREATININE-BSD FMLA CKD-EPI: 33 ML/MIN/1.73 M 2
GLUCOSE SERPL-MCNC: 88 MG/DL (ref 65–99)
HCT VFR BLD AUTO: 34.9 % (ref 37–47)
HGB BLD-MCNC: 11.6 G/DL (ref 12–16)
IMM GRANULOCYTES # BLD AUTO: 0.03 K/UL (ref 0–0.11)
IMM GRANULOCYTES NFR BLD AUTO: 0.5 % (ref 0–0.9)
LACTATE SERPL-SCNC: 0.9 MMOL/L (ref 0.5–2)
LYMPHOCYTES # BLD AUTO: 2.23 K/UL (ref 1–4.8)
LYMPHOCYTES NFR BLD: 34 % (ref 22–41)
MCH RBC QN AUTO: 32.4 PG (ref 27–33)
MCHC RBC AUTO-ENTMCNC: 33.2 G/DL (ref 32.2–35.5)
MCV RBC AUTO: 97.5 FL (ref 81.4–97.8)
MONOCYTES # BLD AUTO: 0.93 K/UL (ref 0–0.85)
MONOCYTES NFR BLD AUTO: 14.2 % (ref 0–13.4)
NEUTROPHILS # BLD AUTO: 3.13 K/UL (ref 1.82–7.42)
NEUTROPHILS NFR BLD: 47.8 % (ref 44–72)
NRBC # BLD AUTO: 0 K/UL
NRBC BLD-RTO: 0 /100 WBC (ref 0–0.2)
NT-PROBNP SERPL IA-MCNC: 2307 PG/ML (ref 0–125)
PLATELET # BLD AUTO: 278 K/UL (ref 164–446)
PMV BLD AUTO: 10.2 FL (ref 9–12.9)
POTASSIUM SERPL-SCNC: 4 MMOL/L (ref 3.6–5.5)
RBC # BLD AUTO: 3.58 M/UL (ref 4.2–5.4)
SODIUM SERPL-SCNC: 135 MMOL/L (ref 135–145)
WBC # BLD AUTO: 6.6 K/UL (ref 4.8–10.8)

## 2023-10-06 PROCEDURE — 83605 ASSAY OF LACTIC ACID: CPT

## 2023-10-06 PROCEDURE — 99239 HOSP IP/OBS DSCHRG MGMT >30: CPT | Mod: GC | Performed by: STUDENT IN AN ORGANIZED HEALTH CARE EDUCATION/TRAINING PROGRAM

## 2023-10-06 PROCEDURE — 80048 BASIC METABOLIC PNL TOTAL CA: CPT

## 2023-10-06 PROCEDURE — 83880 ASSAY OF NATRIURETIC PEPTIDE: CPT

## 2023-10-06 PROCEDURE — 85025 COMPLETE CBC W/AUTO DIFF WBC: CPT

## 2023-10-06 PROCEDURE — G0378 HOSPITAL OBSERVATION PER HR: HCPCS

## 2023-10-06 PROCEDURE — 93306 TTE W/DOPPLER COMPLETE: CPT

## 2023-10-06 PROCEDURE — 93306 TTE W/DOPPLER COMPLETE: CPT | Mod: 26 | Performed by: STUDENT IN AN ORGANIZED HEALTH CARE EDUCATION/TRAINING PROGRAM

## 2023-10-06 ASSESSMENT — PAIN DESCRIPTION - PAIN TYPE: TYPE: ACUTE PAIN

## 2023-10-06 NOTE — CARE PLAN
The patient is Stable - Low risk of patient condition declining or worsening    Shift Goals  Clinical Goals: safety, hemodynamic monitoring  Patient Goals: sleep, comfort  Family Goals: iftikhar    Progress made toward(s) clinical / shift goals:  yes    Problem: Psychosocial  Goal: Patient's level of anxiety will decrease  Outcome: Progressing  Manage anxiety through distraction techniques     Problem: Discharge Barriers/Planning  Goal: Patient's continuum of care needs are met  Outcome: Progressing  Awaiting HH set up, anticipate DC today     Problem: Mobility  Goal: Patient's capacity to carry out activities will improve  Outcome: Progressing  Pt stand by assist with FWW, steady gait     Problem: Fall Risk  Goal: Patient will remain free from falls  Outcome: Progressing  Maintain fall precautions and bed alarm       Patient is not progressing towards the following goals:

## 2023-10-06 NOTE — DISCHARGE PLANNING
HTH/SCP TCN chart review completed. Collaborated with MANI Currie. Current discharge considerations are dc to home with [facility]. No new TCN needs identified. Noted updated 6 clicks mobility of 21 as well. TCN will continue to follow and collaborate with discharge planning team as additional post acute needs arise. Thank you.    Completed:  PT/OT recommending HH on 10/5  Choice obtained: HH ([facility] has accepted)  SCP with [facility]

## 2023-10-06 NOTE — CARE PLAN
"The patient is Stable - Low risk of patient condition declining or worsening    Shift Goals  Clinical Goals: safety, hemodynamic monitoring  Patient Goals: sleep and comfort  Family Goals: iftikhar    Progress made toward(s) clinical / shift goals:         Problem: Fall Risk  Goal: Patient will remain free from falls  Outcome: Progressing  Note:                                       Fall Prevention Protocol    To be followed on all patients at risk for fall    Patient Name: Mimi Appiah    Guidelines for patients at risk to fall    1.  Use environmental precautions:       A.  Place treaded slipper socks on patient       B.  Place bed in low position       C.  Assure personal belongings, wastebasket, call bell, etc.  are in easy reach       D.  Transfer patient toward stronger side       E.  Ensure that a report is given to CNA regarding patient's fall risk     Consider the followin.  Make sure the IV pole is on the same side of bed as the bathroom      2.  Assure the side rail closest to bathroom is down      3.  Bed rails:  [facility] Fall program emphasizes bed rail reduction.  Bed rails contribute to patient fall risk by creating barriers to patient transfer in and out of beds.  Use of bed rails must be assessed specific to individual patient needs.  When possible, use alternative pillows and positioning devices to avail the use of  bed rails.  (Remember:  Four (4) side rails are considered a restraint).    Guidelines for high fall risk patients    Fall risk guidelines as outlined above        A.  Place orange armband on patient      B.  Placement of a magnetic \"At Risk for Fall\" sign on door frame for patients at high risk      C.  Placement of an orange Fall Risk sticker on front of chart       D.  Placement of \"Fall protocol in effect\" sign on wall above patient's head of bed as needed        E.  Frequent toileting offered or bedside commode    Consider the following:        A.  Place patient in " room near nurse's station      B.  Bed alarm on      C.  Physical Therapy/Occupational Therapy consultation for strengthening and mobility, including assessment of home care needs      D.  Physician and / or pharmacy consultation for discussion of potential medication modification or discontinuation      E.  Restraints           Problem: Pain - Standard  Goal: Alleviation of pain or a reduction in pain to the patient’s comfort goal  Outcome: Progressing  Note: Patient denies pain or discomfort.

## 2023-10-06 NOTE — PROGRESS NOTES
Patient discharged home with home health. Discharge information and education provided by this RN, all questions answered. PIV removed. All belongings returned. Patient escorted out via wheelchair. Social work set up uber for patient.

## 2023-10-06 NOTE — ASSESSMENT & PLAN NOTE
Patient got right upper quadrant ultrasound presumably for evaluation of kidneys.  Noted incidental finding of dilation of common bile duct as well as a porcelain gallbladder.  Patient does not want further work-up or any invasive procedures/surgery.  GI was consulted.  Patient understands the risks involved of not pursuing treatment.  Patient will continue to have supportive care and will be discharged home.  Patient at this time does not have any abdominal pain.

## 2023-10-06 NOTE — DISCHARGE PLANNING
MSW received call from bedside RN. Pt is down in the discharge lounge and needs a ride home. MSW arranged uber for pt from discharge lounge.

## 2023-10-06 NOTE — DISCHARGE SUMMARY
[facility] Discharge Summary    Attending: Burton oDll M.d.  Senior Resident: Dr. Jacqueline Fair       CHIEF COMPLAINT ON ADMISSION  Chief Complaint   Patient presents with    Diarrhea    Malaise       Reason for Admission  ems     Admission Date  10/4/2023    CODE STATUS  DNAR/DNI    HPI & HOSPITAL COURSE   Ms. Mimi Appiah is a 77 year old female with a medical history including CKD Stage 3 b, CVA, hypertension, dyslipidemia,  and low back pain.  Patient presented to the ED on 10/4 with weakness and diarrhea. Patient had been at [facility], and developed Diarrhea for four days, prior to presentation.     In  the ED, patient was noted to have an acute kidney failure on her CKD, Creatine of 2.15. Noted possible dehydration from diarrhea. Patient also received Levaquin for five days, ending on 9/14. Patient was admitted for further treatment of LEFTY on CKD, and diarrhea.     Patient had RUQ ultrasound that showed common bile duct dilation, concern for possible gallstone. Porcelain gallbladder also observed, which is concerning for cancerous process. Patient discussed with GI about procedures and imaging, patient does not want to pursue invasive treatment. She understands the risk of infection, increased pain, possible cancer and death.  Had goals of care discussion and patient would like to be DNR/DNI.     Patient's creatine has resolved, lactic acid has downtrended. BNP is elevated causing concern for possible heart failure, that may have caused ischemia leading to diarrhea. ECHO was similar to one performed in 2016. Showed 55% EF vs. 60%. Patient still has Stage 1 dialstolic heart failure.     Patient will be discharged to home with home health.          Therefore, she is discharged in good and stable condition to home with close outpatient follow-up.    The patient met 2-midnight criteria for an inpatient stay at the time of discharge.    Discharge Date  10/6    Physical Exam on Day of  Discharge  Physical Exam  Constitutional:       Appearance: Normal appearance.   Eyes:      Extraocular Movements: Extraocular movements intact.      Pupils: Pupils are equal, round, and reactive to light.   Cardiovascular:      Pulses: Normal pulses.      Heart sounds: Normal heart sounds. No murmur heard.     No gallop.   Pulmonary:      Effort: Pulmonary effort is normal. No respiratory distress.      Breath sounds: Normal breath sounds. No stridor. No wheezing or rales.   Abdominal:      General: Abdomen is flat. Bowel sounds are normal. There is no distension.      Tenderness: There is no abdominal tenderness. There is no rebound.   Musculoskeletal:      Right lower leg: No edema.      Left lower leg: No edema.   Skin:     General: Skin is warm and dry.   Neurological:      General: No focal deficit present.      Mental Status: She is alert and oriented to person, place, and time.         FOLLOW UP ITEMS POST DISCHARGE  Follow up with outpatient PCP  Follow up with Home Health    DISCHARGE DIAGNOSES  Principal Problem:    Acute renal failure with acute tubular necrosis superimposed on stage 4 chronic kidney disease (HCC) (POA: Yes)  Active Problems:    Hypertension (POA: Yes)      Overview: /74 today.  Needs med refilled for hypertension.  Takes Carvedilol       and Amlodipine.      Dyslipidemia (POA: Yes)    Stage 4 chronic kidney disease (HCC) (POA: Yes)      Overview: Chronic, history of chronic kidney disease back to the thousand 16.  Most       recent GFR in January 2022 was at 33.  Currently not seeing nephrology,       states that she took too much Aleve when she was younger for back pain.        She is aware to avoid NSAIDs as well as staying well-hydrated.  We will       continue to monitor    Class 1 obesity due to excess calories with serious comorbidity and body mass index (BMI) of 33.0 to 33.9 in adult (Chronic) (POA: Yes)    Hyponatremia (POA: Yes)    Diarrhea (POA: Yes)    Age-related  physical debility (POA: Yes)    Advanced care planning/counseling discussion (POA: Yes)    Choledocholithiasis (POA: Unknown)    Porcelain gallbladder (POA: Yes)    DNR (do not resuscitate) (POA: Yes)  Resolved Problems:    * No resolved hospital problems. *      FOLLOW UP  Future Appointments   Date Time Provider Department Center   10/12/2023  2:30 PM Margaret Buening, PT [address]   10/19/2023  2:30 PM Margaret Buening, PT [address]   10/26/2023  2:30 PM Margaret Buening, PT [address]   11/2/2023  2:30 PM Margaret Buening, PT [address]     No follow-up provider specified.    MEDICATIONS ON DISCHARGE     Medication List        ASK your doctor about these medications        Instructions   acetaminophen 325 MG Tabs  Commonly known as: Tylenol   Take 650 mg by mouth every 6 hours as needed for Fever or Mild Pain.  Dose: 650 mg     allopurinol 100 MG Tabs  Commonly known as: Zyloprim   Take 1 Tablet by mouth every day.  Dose: 100 mg     aspirin EC 81 MG Tbec  Commonly known as: Ecotrin   Take 81 mg by mouth every day.  Dose: 81 mg     cetirizine 10 MG Tabs  Commonly known as: ZyrTEC   Take 10 mg by mouth 1 time a day as needed for Allergies.  Dose: 10 mg     levoFLOXacin 750 MG tablet  Commonly known as: Levaquin   Take 750 mg by mouth every day. 5 day course prescribed 9/11/2023.  Dose: 750 mg     Nirmatrelvir&Ritonavir 300/100 20 x 150 MG & 10 x 100MG Tbpk   Take 150 mg nirmatrelvir (one 150 mg tablet) with 100 mg  ritonavir (one 100 mg tablet) by mouth, with both tablets taken together  twice daily for 5 days.     VITAMIN D3 PO  Ask about: Which instructions should I use?   Take 1 Tablet by mouth every day.  Dose: 1 Tablet              Allergies  Allergies   Allergen Reactions    Penicillins Swelling     Other reaction(s): head and face swelling    Risperdal [Benzoic Acid-Risperidone] Unspecified     Other reaction(s): bad reaction all over body    Lima Beans Rash and Itching     Pt gets  swollen lips with butter and lima beans.    Atenolol Swelling     Only at doses above 50mg    Chlorpheniramine Unspecified     Patient unsure of reaction    Peach [Prunus Persica] Nausea     Pt gets nauseous with peaches       DIET  Orders Placed This Encounter   Procedures    Diet Order Diet: Renal     Standing Status:   Standing     Number of Occurrences:   1     Order Specific Question:   Diet:     Answer:   Renal [8]       ACTIVITY  As tolerated.  Weight bearing as tolerated    CONSULTATIONS  Gastroenterology    PROCEDURES  None    LABORATORY  Lab Results   Component Value Date    SODIUM 135 10/05/2023    POTASSIUM 3.8 10/05/2023    CHLORIDE 105 10/05/2023    CO2 20 10/05/2023    GLUCOSE 76 10/05/2023    BUN 19 10/05/2023    CREATININE 1.73 (H) 10/05/2023        Lab Results   Component Value Date    WBC 6.4 10/05/2023    HEMOGLOBIN 11.3 (L) 10/05/2023    HEMATOCRIT 35.2 (L) 10/05/2023    PLATELETCT 287 10/05/2023        Total time of the discharge process exceeds 35 minutes.

## 2023-10-06 NOTE — DISCHARGE INSTRUCTIONS
Discharge Instructions    Discharged to home by car with self. Discharged via walking, hospital escort: Yes.  Special equipment needed: Not Applicable    Be sure to schedule a follow-up appointment with your primary care doctor or any specialists as instructed.     Discharge Plan:   Diet Plan: Discussed  Activity Level: Discussed  Confirmed Follow up Appointment: Patient to Call and Schedule Appointment  Confirmed Symptoms Management: Discussed  Medication Reconciliation Updated: Yes  Influenza Vaccine Indication: Patient Refuses    I understand that a diet low in cholesterol, fat, and sodium is recommended for good health. Unless I have been given specific instructions below for another diet, I accept this instruction as my diet prescription.   Other diet: regular heart healthy dinner    Special Instructions: None    -Is this patient being discharged with medication to prevent blood clots?  No    Is patient discharged on Warfarin / Coumadin?   No

## 2023-10-07 PROBLEM — I51.89 DIASTOLIC DYSFUNCTION: Status: ACTIVE | Noted: 2023-10-07

## 2023-10-12 ENCOUNTER — APPOINTMENT (OUTPATIENT)
Dept: PHYSICAL THERAPY | Facility: REHABILITATION | Age: 78
End: 2023-10-12
Payer: MEDICARE

## 2023-10-12 ENCOUNTER — TELEPHONE (OUTPATIENT)
Dept: PHYSICAL THERAPY | Facility: REHABILITATION | Age: 78
End: 2023-10-12
Payer: MEDICARE

## 2023-10-12 NOTE — OP THERAPY DISCHARGE SUMMARY
Outpatient Physical Therapy  DISCHARGE SUMMARY NOTE      [facility]  [address]  [address]  [place] [address]  Phone:  [phone]  Fax:  [phone]    Date of Visit: 10/12/2023    Patient: Mimi Appiah  YOB: 1945  MRN: [id number]     Referring Provider: ROCIO Delacruz  [address]  [place] [address]   Referring Diagnosis Pain in left hip [M25.552];Pain in left knee [M25.562];Other chronic pain [G89.29]         Functional Assessment Used: n/a on D/C        Your patient is being discharged from Physical Therapy with the following comments:   Patient had hospitalization due to illness since last visit. PT called and spoke with patient, patient reports she is now receiving HH services. Discussed D/C from out patient PT with patient at this time and patient educated to retrieve new skilled PT referral for out patient if plan is to transition back to outpatient PT after HH. Patient verbalizes understanding.     Comments:  D/C skilled PT. Patient reports she is now going to be receiving HH PT.      Limitations Remaining:  Unknown at this time. Last visit with PT 8/31/23.     Recommendations:  Follow up with MD. Follow up with home health services.     Thank you for allowing me to participate in this patient's care.       Margaret Minaya, PT    Date: 10/12/2023

## 2023-10-13 ENCOUNTER — APPOINTMENT (OUTPATIENT)
Dept: MEDICAL GROUP | Facility: MEDICAL CENTER | Age: 78
End: 2023-10-13
Payer: MEDICARE

## 2023-10-19 ENCOUNTER — APPOINTMENT (OUTPATIENT)
Dept: PHYSICAL THERAPY | Facility: REHABILITATION | Age: 78
End: 2023-10-19
Payer: MEDICARE

## 2023-10-19 ENCOUNTER — HOME CARE VISIT (OUTPATIENT)
Dept: HOME HEALTH SERVICES | Facility: HOME HEALTHCARE | Age: 78
End: 2023-10-19
Payer: MEDICARE

## 2023-10-19 VITALS
RESPIRATION RATE: 16 BRPM | SYSTOLIC BLOOD PRESSURE: 140 MMHG | OXYGEN SATURATION: 94 % | HEART RATE: 80 BPM | WEIGHT: 200 LBS | TEMPERATURE: 98 F | HEIGHT: 65 IN | BODY MASS INDEX: 33.32 KG/M2 | DIASTOLIC BLOOD PRESSURE: 86 MMHG

## 2023-10-19 PROCEDURE — G0299 HHS/HOSPICE OF RN EA 15 MIN: HCPCS

## 2023-10-19 PROCEDURE — 665001 SOC-HOME HEALTH

## 2023-10-19 ASSESSMENT — ENCOUNTER SYMPTOMS
PAIN LOCATION - PAIN FREQUENCY: CONSTANT
PAIN LOCATION: BACK PAIN
PAIN: 1
SUBJECTIVE PAIN PROGRESSION: WAXING AND WANING
PERSON REPORTING PAIN: PATIENT
PAIN LOCATION - EXACERBATING FACTORS: WALKING
LOWEST PAIN SEVERITY IN PAST 24 HOURS: 2/10
PAIN SEVERITY GOAL: 2/10
PAIN LOCATION - PAIN SEVERITY: 5/10
PAIN LOCATION - RELIEVING FACTORS: REST, MEDICATION
HIGHEST PAIN SEVERITY IN PAST 24 HOURS: 5/10
PAIN LOCATION - PAIN DURATION: LONG

## 2023-10-19 ASSESSMENT — FIBROSIS 4 INDEX: FIB4 SCORE: 1.18

## 2023-10-20 ENCOUNTER — DOCUMENTATION (OUTPATIENT)
Dept: MEDICAL GROUP | Facility: PHYSICIAN GROUP | Age: 78
End: 2023-10-20
Payer: MEDICARE

## 2023-10-20 SDOH — ECONOMIC STABILITY: HOUSING INSECURITY: OPEN FLAME PRESENT: 1

## 2023-10-20 SDOH — ECONOMIC STABILITY: HOUSING INSECURITY
HOME SAFETY: DISCUSSED SAFETY CONCERNS REGARDING SMOKING IN THE HOME, CLUTTER, AND FLAMMABLE MATERIALS. PATIENT VERBALIZED UNDERSTANDING.

## 2023-10-20 ASSESSMENT — ENCOUNTER SYMPTOMS
SHORTNESS OF BREATH: 1
LAST BOWEL MOVEMENT: 66766
BOWEL PATTERN NORMAL: 1
VOMITING: DENIES
NAUSEA: DENIES
DYSPNEA ACTIVITY LEVEL: AFTER AMBULATING MORE THAN 20 FT

## 2023-10-20 ASSESSMENT — ACTIVITIES OF DAILY LIVING (ADL): OASIS_M1830: 03

## 2023-10-20 NOTE — PROGRESS NOTES
Medication chart review for [facility] services    Received referral from [facility].   Medications reviewed  compared with discharge summary if available.  Discharge summary date, if applicable:   10/6/23    Current medication list per [facility]     Medication list one, patient is currently taking    Current Outpatient Medications:     Multiple Vitamins-Minerals (CENTRUM WOMEN PO), 1 Tablet, Oral, DAILY    Cholecalciferol (VITAMIN D3 PO), 1 Tablet, Oral, DAILY    acetaminophen, 650 mg, Oral, Q6HRS PRN    allopurinol, 100 mg, Oral, DAILY    aspirin EC, 81 mg, Oral, DAILY      Medication list two, drugs that the patient has been prescribed or recommended to take by their healthcare provider on discharge summary  ASK your doctor about these medications         Instructions   acetaminophen 325 MG Tabs  Commonly known as: Tylenol    Take 650 mg by mouth every 6 hours as needed for Fever or Mild Pain.  Dose: 650 mg      allopurinol 100 MG Tabs  Commonly known as: Zyloprim    Take 1 Tablet by mouth every day.  Dose: 100 mg      aspirin EC 81 MG Tbec  Commonly known as: Ecotrin    Take 81 mg by mouth every day.  Dose: 81 mg      cetirizine 10 MG Tabs  Commonly known as: ZyrTEC    Take 10 mg by mouth 1 time a day as needed for Allergies.  Dose: 10 mg      levoFLOXacin 750 MG tablet  Commonly known as: Levaquin    Take 750 mg by mouth every day. 5 day course prescribed 9/11/2023.  Dose: 750 mg      Nirmatrelvir&Ritonavir 300/100 20 x 150 MG & 10 x 100MG Tbpk    Take 150 mg nirmatrelvir (one 150 mg tablet) with 100 mg  ritonavir (one 100 mg tablet) by mouth, with both tablets taken together  twice daily for 5 days.      VITAMIN D3 PO  Ask about: Which instructions should I use?    Take 1 Tablet by mouth every day.  Dose: 1 Tablet       Allergies   Allergen Reactions    Penicillins Swelling     Other reaction(s): head and face swelling    Risperdal [Benzoic Acid-Risperidone] Unspecified     Other reaction(s): bad  reaction all over body    Lima Beans Rash and Itching     Pt gets swollen lips with butter and lima beans.    Atenolol Swelling     Only at doses above 50mg    Chlorpheniramine Unspecified     Patient unsure of reaction    Peach [Prunus Persica] Nausea     Pt gets nauseous with peaches       Labs     Lab Results   Component Value Date/Time    SODIUM 135 10/06/2023 08:01 AM    POTASSIUM 4.0 10/06/2023 08:01 AM    CHLORIDE 106 10/06/2023 08:01 AM    CO2 18 (L) 10/06/2023 08:01 AM    GLUCOSE 88 10/06/2023 08:01 AM    BUN 23 (H) 10/06/2023 08:01 AM    CREATININE 1.61 (H) 10/06/2023 08:01 AM    BUNCREATRAT 13 01/06/2022 09:31 AM     Lab Results   Component Value Date/Time    ALKPHOSPHAT 89 10/05/2023 05:18 AM    ASTSGOT 13 10/05/2023 05:18 AM    ALTSGPT 9 10/05/2023 05:18 AM    TBILIRUBIN 0.4 10/05/2023 05:18 AM    INR 1.01 09/17/2023 02:19 PM    ALBUMIN 2.6 (L) 10/05/2023 05:18 AM        Assessment for clinically significant drug interactions, drug omissions/additions, duplicative therapies.            CC   Do Moreno, A.P.R.N.  [address]  [place] [address]  Fax: [phone]    [facility]  Phone [phone] fax [phone]    This note was created using voice recognition software (Dragon). The accuracy of the dictation is limited by the abilities of the software. I have reviewed the note prior to signing, however some errors in grammar and context are still possible. If you have any questions related to this note please do not hesitate to contact our office.

## 2023-10-23 PROCEDURE — G0180 MD CERTIFICATION HHA PATIENT: HCPCS

## 2023-10-24 ENCOUNTER — HOME CARE VISIT (OUTPATIENT)
Dept: HOME HEALTH SERVICES | Facility: HOME HEALTHCARE | Age: 78
End: 2023-10-24
Payer: MEDICARE

## 2023-10-25 ENCOUNTER — HOME CARE VISIT (OUTPATIENT)
Dept: HOME HEALTH SERVICES | Facility: HOME HEALTHCARE | Age: 78
End: 2023-10-25
Payer: MEDICARE

## 2023-10-25 NOTE — CASE COMMUNICATION
Quality Review for SOC OASIS by JANNETTE Murillo, CARMEN on  October 25, 2023     Edits completed by JANNETTE Murillo RN:  1.  and  diagnosis coding updated per chart review.  2. Changed  to 10/6/23, date of valid referral  3. Per narrative that patient needs supervision and a walker for safety, the following changes were made: changed  to 2 and  to 3; [id number] E, F, G to 4   4.  is 3 per ambulation status  5. Compl eted F2F information

## 2023-10-26 ENCOUNTER — HOME CARE VISIT (OUTPATIENT)
Dept: HOME HEALTH SERVICES | Facility: HOME HEALTHCARE | Age: 78
End: 2023-10-26
Payer: MEDICARE

## 2023-10-26 ENCOUNTER — APPOINTMENT (OUTPATIENT)
Dept: PHYSICAL THERAPY | Facility: REHABILITATION | Age: 78
End: 2023-10-26
Payer: MEDICARE

## 2023-10-26 PROCEDURE — G0151 HHCP-SERV OF PT,EA 15 MIN: HCPCS

## 2023-10-26 ASSESSMENT — ACTIVITIES OF DAILY LIVING (ADL)
AMBULATION ASSISTANCE ON FLAT SURFACES: 1
AMBULATION_DISTANCE/DURATION_TOLERATED: 20FT
AMBULATION ASSISTANCE: 1
AMBULATION ASSISTANCE: SUPERVISION
AMBULATION ASSISTANCE ON UNEVEN SURFACES: 1
PHYSICAL TRANSFERS ASSESSED: 1
CURRENT_FUNCTION: SUPERVISION

## 2023-10-26 ASSESSMENT — ENCOUNTER SYMPTOMS
MUSCLE WEAKNESS: 1
PERSON REPORTING PAIN: PATIENT
DENIES PAIN: 1

## 2023-10-26 NOTE — CASE COMMUNICATION
Agree with changes.    ----- Message -----  From: Lashell Murillo R.N.  Sent: 10/25/2023   1:34 PM PDT  To: Dalton Dorado R.N.      Quality Review for SOC OASIS by JANNETTE Murillo RN on  October 25, 2023     Edits completed by JANNETTE Murillo RN:  1.  and  diagnosis coding updated per chart review.  2. Changed  to 10/6/23, date of valid referral  3. Per narrative that patient needs supervision and a walker for safety,  the following changes were made: changed  to 2 and  to 3; [id number] E, F, G to 4   4.  is 3 per ambulation status  5. Completed F2F information

## 2023-10-31 ENCOUNTER — HOME CARE VISIT (OUTPATIENT)
Dept: HOME HEALTH SERVICES | Facility: HOME HEALTHCARE | Age: 78
End: 2023-10-31
Payer: MEDICARE

## 2023-10-31 PROCEDURE — G0155 HHCP-SVS OF CSW,EA 15 MIN: HCPCS

## 2023-10-31 ASSESSMENT — ACTIVITIES OF DAILY LIVING (ADL): SHOPPING_REQUIRES_ASSISTANCE: 1

## 2023-11-02 ENCOUNTER — APPOINTMENT (OUTPATIENT)
Dept: PHYSICAL THERAPY | Facility: REHABILITATION | Age: 78
End: 2023-11-02
Payer: MEDICARE

## 2023-11-06 ENCOUNTER — HOME CARE VISIT (OUTPATIENT)
Dept: HOME HEALTH SERVICES | Facility: HOME HEALTHCARE | Age: 78
End: 2023-11-06
Payer: MEDICARE

## 2023-11-06 ASSESSMENT — ACTIVITIES OF DAILY LIVING (ADL)
OASIS_M1830: 02
HOME_HEALTH_OASIS: 01

## 2023-11-06 NOTE — CASE COMMUNICATION
Quality Review for 10.31.23 DC OASIS performed on by ZAIN Degroot RN on 11.6.2023:     Edits completed by ZAIN Degroot RN:  1. Changed , , ,  to 1 and  to 2 per  response of 3 and [id number] I response of 4. Changed  to 1 and  to 1 per [id number] D-F response of 4.   2. Changed  to 3 per  response of 3  3. Changed  A,C to 1 per Cg note pt has ample assistance

## 2023-11-07 NOTE — CASE COMMUNICATION
I agree with these changes.  Alexa Castillo PT, DPT  ----- Message -----  From: Rosa Degroot R.N.  Sent: 11/6/2023   3:18 PM PST  To: Alexa Castillo PT      Quality Review for 10.31.23 DC OASIS performed on by ZAIN Degroot RN on 11.6.2023:     Edits completed by ZAIN Degroot RN:  1. Changed , , ,  to 1 and  to 2 per  response of 3 and [id number] I response of 4. Changed  to 1 and  to 1 per [id number] D-F  response of 4.   2. Changed  to 3 per  response of 3  3. Changed  A,C to 1 per Cg note pt has ample assistance

## 2023-11-07 NOTE — ASSESSMENT & PLAN NOTE
Likely secondary to her acute infection  Patient denies any chest pain she is currently on room air  Clinical suspicion for PE is low given her LEFTY hold off on further diagnostic testing unless she develops symptoms suggestive of PE   Strong peripheral pulses

## 2023-12-19 ENCOUNTER — OFFICE VISIT (OUTPATIENT)
Dept: MEDICAL GROUP | Facility: MEDICAL CENTER | Age: 78
End: 2023-12-19
Payer: MEDICARE

## 2023-12-19 VITALS
HEART RATE: 86 BPM | TEMPERATURE: 97.1 F | OXYGEN SATURATION: 99 % | BODY MASS INDEX: 33.05 KG/M2 | WEIGHT: 198.4 LBS | HEIGHT: 65 IN | DIASTOLIC BLOOD PRESSURE: 100 MMHG | SYSTOLIC BLOOD PRESSURE: 146 MMHG

## 2023-12-19 DIAGNOSIS — R47.1 DYSARTHRIA: ICD-10-CM

## 2023-12-19 DIAGNOSIS — M25.552 LEFT HIP PAIN: ICD-10-CM

## 2023-12-19 DIAGNOSIS — K82.8 PORCELAIN GALLBLADDER: ICD-10-CM
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
## 2023-12-19 DIAGNOSIS — I10 PRIMARY HYPERTENSION: ICD-10-CM

## 2023-12-19 PROBLEM — I69.910: Status: ACTIVE | Noted: 2023-09-20

## 2023-12-19 PROCEDURE — 3077F SYST BP >= 140 MM HG: CPT

## 2023-12-19 PROCEDURE — 99214 OFFICE O/P EST MOD 30 MIN: CPT

## 2023-12-19 PROCEDURE — 3080F DIAST BP >= 90 MM HG: CPT

## 2023-12-19 RX ORDER — AMLODIPINE BESYLATE 5 MG/1
5 TABLET ORAL DAILY
Qty: 90 TABLET | Refills: 3 | Status: SHIPPED | OUTPATIENT
Start: 2023-12-19

## 2023-12-19 ASSESSMENT — FIBROSIS 4 INDEX: FIB4 SCORE: 1.2

## 2023-12-19 ASSESSMENT — ENCOUNTER SYMPTOMS
ORTHOPNEA: 0
CHILLS: 0
PALPITATIONS: 0
FEVER: 0
COUGH: 0
SHORTNESS OF BREATH: 0

## 2023-12-19 NOTE — PROGRESS NOTES
Subjective:     CC: Follow-Up (Pt states she fell since being released from the Hospital and is having a lot of pain in her left hip.)      HPI:   Mimi is a 78 y.o. female who presents today for:    Ground level fall/ left hip pain: She reports that she had a fall about 2 weeks ago. She was discharged from the hospital on 10/4-10/7.  She has been experiencing a lot of left hip pain. She reports she was sitting in her chair at home and woke up on the ground. She states she is uncertain how long she had LOC, she was on the ground for 2 hours before she could get herself up. She is able to ambulate, she uses a cane or a walker. She states she has been using OCT medications, but states that they aren't helping with the pain. She has been feeling over all better the last few days since her fall. The pain has been getting better.  No trouble with ROM.     Dysarthria: she reports she has had trouble with finding her words for the last 3 months, although she is able to express herself clearly today. She has also had some trouble with short term memory.     Hypertension: blood pressure elevated today, 146/100. Her blood pressure medication was discontinued when she was in the hospital.    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  Ultrasound showed, common bile duct dilation, proximal echogenic focus in the common bile duct likely common bile duct stone.  Thickened shadowing anterior gallbladder wall, compatible with porcelain gallbladder, appearance raising concern for cholecystitis but indeterminant given porcelain gallbladder.  It was explained to her by the gastroenterologist in the hospital that it would be recommended that she undergo an MRCP to remove the gallstone, and discuss potential cholecystectomy due to the increased risk of cancer from porcelain gallbladder.  Patient declined.  She states she is not interested in surgery again today, and states she understands  "the risks.      Allergies: Penicillins, Risperdal [benzoic acid-risperidone], Lima beans, Atenolol, Chlorpheniramine, and Peach [prunus persica]     Medications:   Current Outpatient Medications:     amLODIPine (NORVASC) 5 MG Tab, Take 1 Tablet by mouth every day., Disp: 90 Tablet, Rfl: 3    Multiple Vitamins-Minerals (CENTRUM WOMEN PO), Take 1 Tablet by mouth every day., Disp: , Rfl:     Cholecalciferol (VITAMIN D3 PO), Take 1 Tablet by mouth every day., Disp: , Rfl:     acetaminophen (TYLENOL) 325 MG Tab, Take 650 mg by mouth every 6 hours as needed for Fever or Mild Pain. Indications: Pain, Disp: , Rfl:     allopurinol (ZYLOPRIM) 100 MG Tab, Take 1 Tablet by mouth every day., Disp: 100 Tablet, Rfl: 3    aspirin EC (ECOTRIN) 81 MG Tablet Delayed Response, Take 81 mg by mouth every day. Indications: Stroke Due To Limited Blood Flow, Disp: , Rfl:       ROS:  Review of Systems   Constitutional:  Negative for chills and fever.   Respiratory:  Negative for cough and shortness of breath.    Cardiovascular:  Negative for chest pain, palpitations, orthopnea and leg swelling.       Objective:     Exam:  BP (!) 146/100   Pulse 86   Temp 36.2 °C (97.1 °F) (Temporal)   Ht 1.651 m (5' 5\")   Wt 90 kg (198 lb 6.4 oz)   SpO2 99%   BMI 33.02 kg/m²  Body mass index is 33.02 kg/m².    Physical Exam  Constitutional:       Appearance: Normal appearance.   Eyes:      Pupils: Pupils are equal, round, and reactive to light.   Cardiovascular:      Rate and Rhythm: Normal rate and regular rhythm.      Pulses: Normal pulses.      Heart sounds: Normal heart sounds.   Pulmonary:      Effort: Pulmonary effort is normal.      Breath sounds: Normal breath sounds.   Abdominal:      General: Bowel sounds are normal.      Palpations: Abdomen is soft.   Neurological:      Mental Status: She is alert and oriented to person, place, and time.   Psychiatric:         Mood and Affect: Mood normal.         Behavior: Behavior normal. "           Assessment & Plan:     Mimi a 78 y.o. female with the following -     1. Ground-level fall  Acute, uncomplicated  Referral to physical therapy placed.  Encourage patient to use walker whenever ambulating.  Remove any obstructions on the floor.  Make sure there is good lighting when ambulating.  - Referral to Physical Therapy    2. Left hip pain  Acute, uncomplicated  Pain has been improving over the last 2 weeks.  She denies any injury, and is able to ambulate without decreased range of motion.  Encouraged her to continue to use Tylenol as needed for pain management.    3. Dysarthria  Chronic, stable  Patient is able to communicate well today.  She did not appear to have trouble finding her words, but states that she has been experiencing this issue since she had her stroke.    4. Primary hypertension  Chronic, unstable  We will resume low-dose amlodipine to help with blood pressure management.  Blood pressure elevated today, 146/100.  - amLODIPine (NORVASC) 5 MG Tab; Take 1 Tablet by mouth every day.  Dispense: 90 Tablet; Refill: 3    5. Porcelain gallbladder  Chronic, stable  We discussed ultrasound results today.  Patient declined wanting to proceed with follow-up with GI for an MRCP.  She states that she understands the risks involved.        Anticipatory guidance included the following: Patient counseled about skin care, diet, supplements, smoking, drugs/alcohol use, safe sex and exercise.     Return in about 4 weeks (around 1/16/2024).    Please note that this dictation was created using voice recognition software. I have made every reasonable attempt to correct obvious errors, but I expect that there are errors of grammar and possibly content that I did not discover before finalizing the note.

## 2024-01-11 DIAGNOSIS — M25.552 LEFT HIP PAIN: ICD-10-CM
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
## 2024-01-19 ENCOUNTER — OFFICE VISIT (OUTPATIENT)
Dept: MEDICAL GROUP | Facility: MEDICAL CENTER | Age: 79
End: 2024-01-19
Payer: MEDICARE

## 2024-01-19 VITALS
OXYGEN SATURATION: 100 % | HEART RATE: 82 BPM | DIASTOLIC BLOOD PRESSURE: 82 MMHG | HEIGHT: 65 IN | WEIGHT: 195.6 LBS | SYSTOLIC BLOOD PRESSURE: 146 MMHG | BODY MASS INDEX: 32.59 KG/M2 | TEMPERATURE: 97.7 F

## 2024-01-19 DIAGNOSIS — I10 PRIMARY HYPERTENSION: ICD-10-CM

## 2024-01-19 DIAGNOSIS — E66.9 OBESITY (BMI 30-39.9): ICD-10-CM

## 2024-01-19 DIAGNOSIS — M10.9 GOUT, UNSPECIFIED CAUSE, UNSPECIFIED CHRONICITY, UNSPECIFIED SITE: ICD-10-CM

## 2024-01-19 DIAGNOSIS — G31.9 DEGENERATIVE DISEASE OF NERVOUS SYSTEM, UNSPECIFIED (HCC): ICD-10-CM

## 2024-01-19 DIAGNOSIS — F33.40 MDD (RECURRENT MAJOR DEPRESSIVE DISORDER) IN REMISSION (HCC): ICD-10-CM

## 2024-01-19 DIAGNOSIS — N18.32 STAGE 3B CHRONIC KIDNEY DISEASE: ICD-10-CM

## 2024-01-19 DIAGNOSIS — I70.0 ATHEROSCLEROSIS OF AORTA (HCC): ICD-10-CM

## 2024-01-19 PROBLEM — N17.9 AKI (ACUTE KIDNEY INJURY) (HCC): Status: RESOLVED | Noted: 2023-09-17 | Resolved: 2024-01-19

## 2024-01-19 PROBLEM — M25.551 PAIN OF RIGHT HIP: Status: RESOLVED | Noted: 2023-08-22 | Resolved: 2024-01-19

## 2024-01-19 PROBLEM — K62.5 RECTAL BLEEDING: Status: RESOLVED | Noted: 2023-09-17 | Resolved: 2024-01-19

## 2024-01-19 PROBLEM — R79.89 ELEVATED D-DIMER: Status: RESOLVED | Noted: 2023-09-17 | Resolved: 2024-01-19

## 2024-01-19 PROBLEM — U07.1 COVID-19: Status: RESOLVED | Noted: 2023-09-17 | Resolved: 2024-01-19

## 2024-01-19 PROBLEM — E87.20 LACTIC ACIDOSIS: Status: RESOLVED | Noted: 2023-09-17 | Resolved: 2024-01-19

## 2024-01-19 PROBLEM — I95.9 HYPOTENSION: Status: RESOLVED | Noted: 2023-09-17 | Resolved: 2024-01-19

## 2024-01-19 PROBLEM — D72.820 LYMPHOCYTOSIS: Status: RESOLVED | Noted: 2020-07-30 | Resolved: 2024-01-19

## 2024-01-19 PROBLEM — R79.89 ELEVATED TROPONIN: Status: RESOLVED | Noted: 2023-09-17 | Resolved: 2024-01-19

## 2024-01-19 PROCEDURE — 3079F DIAST BP 80-89 MM HG: CPT

## 2024-01-19 PROCEDURE — 3077F SYST BP >= 140 MM HG: CPT

## 2024-01-19 PROCEDURE — 99214 OFFICE O/P EST MOD 30 MIN: CPT

## 2024-01-19 RX ORDER — MULTIVIT WITH MINERALS/LUTEIN
TABLET ORAL
COMMUNITY

## 2024-01-19 RX ORDER — CARVEDILOL 6.25 MG/1
6.25 TABLET ORAL 2 TIMES DAILY WITH MEALS
Qty: 200 TABLET | Refills: 3 | Status: SHIPPED | OUTPATIENT
Start: 2024-01-19 | End: 2025-01-18

## 2024-01-19 RX ORDER — ALLOPURINOL 100 MG/1
100 TABLET ORAL DAILY
Qty: 100 TABLET | Refills: 3 | Status: SHIPPED | OUTPATIENT
Start: 2024-01-19

## 2024-01-19 ASSESSMENT — ENCOUNTER SYMPTOMS
PALPITATIONS: 0
ORTHOPNEA: 0
FEVER: 0
SHORTNESS OF BREATH: 0
COUGH: 0
CHILLS: 0

## 2024-01-19 ASSESSMENT — PATIENT HEALTH QUESTIONNAIRE - PHQ9: CLINICAL INTERPRETATION OF PHQ2 SCORE: 0

## 2024-01-19 ASSESSMENT — FIBROSIS 4 INDEX: FIB4 SCORE: 1.2

## 2024-01-19 NOTE — PROGRESS NOTES
"Subjective:     CC: Follow-Up      HPI:   Mimi is a 78 y.o. female who presents today for:    Depression: she states she has been under more stress because she is worried about her son who is sick. She is not on medication for depression. She is not seeing a therapist. Not interested in one at this time. She does not want to start medication.     Hypertension: blood pressure is elevated today 146/82. She is taking amlodipine. She is not checking her blood pressure. She is not having any side effects.     Allergies: Penicillins, Risperdal [benzoic acid-risperidone], Lima beans, Atenolol, Chlorpheniramine, and Peach [prunus persica]     Medications:   Current Outpatient Medications:     Ascorbic Acid (VITAMIN C) 1000 MG Tab, Take  by mouth., Disp: , Rfl:     allopurinol (ZYLOPRIM) 100 MG Tab, Take 1 Tablet by mouth every day. Indications: Gout, Disp: 100 Tablet, Rfl: 3    carvedilol (COREG) 6.25 MG Tab, Take 1 Tablet by mouth 2 times a day with meals., Disp: 200 Tablet, Rfl: 3    amLODIPine (NORVASC) 5 MG Tab, Take 1 Tablet by mouth every day., Disp: 90 Tablet, Rfl: 3    Cholecalciferol (VITAMIN D3 PO), Take 1 Tablet by mouth every day., Disp: , Rfl:     acetaminophen (TYLENOL) 325 MG Tab, Take 650 mg by mouth every 6 hours as needed for Fever or Mild Pain. Indications: Pain, Disp: , Rfl:     aspirin EC (ECOTRIN) 81 MG Tablet Delayed Response, Take 81 mg by mouth every day. Indications: Stroke Due To Limited Blood Flow, Disp: , Rfl:     Multiple Vitamins-Minerals (CENTRUM WOMEN PO), Take 1 Tablet by mouth every day. (Patient not taking: Reported on 1/19/2024), Disp: , Rfl:       ROS:  Review of Systems   Constitutional:  Negative for chills and fever.   Respiratory:  Negative for cough and shortness of breath.    Cardiovascular:  Negative for chest pain, palpitations, orthopnea and leg swelling.       Objective:     Exam:  BP (!) 146/82   Pulse 82   Temp 36.5 °C (97.7 °F) (Temporal)   Ht 1.651 m (5' 5\")   Wt " 88.7 kg (195 lb 9.6 oz)   SpO2 100%   BMI 32.55 kg/m²  Body mass index is 32.55 kg/m².    Physical Exam  Constitutional:       Appearance: Normal appearance.   Eyes:      Pupils: Pupils are equal, round, and reactive to light.   Cardiovascular:      Rate and Rhythm: Normal rate and regular rhythm.      Pulses: Normal pulses.      Heart sounds: Normal heart sounds.   Pulmonary:      Effort: Pulmonary effort is normal.      Breath sounds: Normal breath sounds.   Abdominal:      General: Bowel sounds are normal.      Palpations: Abdomen is soft.   Neurological:      Mental Status: She is alert and oriented to person, place, and time.   Psychiatric:         Mood and Affect: Mood normal.         Behavior: Behavior normal.           Assessment & Plan:     Mimi william 78 y.o. female with the following -     1. MDD (recurrent major depressive disorder) in remission (HCC)  Chronic, stable  Continue to speak with family as necessary for depression.  Patient not interested in referral to therapy or starting medication at this time.  Speaking with her family is most helpful.    2. Primary hypertension  Chronic, unstable   Blood pressure remains elevated, with 5 mg of amlodipine.  Patient previously on carvedilol, she tolerated this well and would like to go back on the medication.  She does have an allergy to atenolol, but states she has not had issues with carvedilol in the past.  - carvedilol (COREG) 6.25 MG Tab; Take 1 Tablet by mouth 2 times a day with meals.  Dispense: 200 Tablet; Refill: 3  -Continue amLODIPine (NORVASC) 5 MG Tab, Take 1 Tablet by mouth every day., Disp: 90 Tablet, Rfl: 3    3. Obesity (BMI 30-39.9)  Chronic, stable- Patient identified as having weight management issue.  Appropriate orders and counseling given.    4. Stage 3b chronic kidney disease (HCC)  5. Degenerative disease of nervous system, unspecified (HCC)  6. Atherosclerosis of aorta (HCC)  See HCC gap form    7. Gout, unspecified cause,  unspecified chronicity, unspecified site  Chronic, stable  - allopurinol (ZYLOPRIM) 100 MG Tab; Take 1 Tablet by mouth every day. Indications: Gout  Dispense: 100 Tablet; Refill: 3    Other orders  - Ascorbic Acid (VITAMIN C) 1000 MG Tab; Take  by mouth.        HCC Gap Form    Diagnosis: G31.9 - Degenerative disease of nervous system, unspecified (HCC)  Assessment and plan: Chronic, stable. Continue with current defined treatment plan: Continue to monitor for memory changes. Follow-up at least annually.  Diagnosis: I70.0 - Atherosclerosis of aorta (HCC)  Assessment and plan: Chronic, stable. Continue with current defined treatment plan: Continue to monitor cholesterol.  Patient instructed to work on diet to help with cholesterol management.. Follow-up at least annually.  Last edited 01/19/24 16:42 PST by ESME Delacruz.           Anticipatory guidance included the following: Patient counseled about skin care, diet, supplements, smoking, drugs/alcohol use, safe sex and exercise.     Return in about 4 weeks (around 2/16/2024).    Please note that this dictation was created using voice recognition software. I have made every reasonable attempt to correct obvious errors, but I expect that there are errors of grammar and possibly content that I did not discover before finalizing the note.

## 2024-02-13 ENCOUNTER — OFFICE VISIT (OUTPATIENT)
Dept: MEDICAL GROUP | Facility: MEDICAL CENTER | Age: 79
End: 2024-02-13
Payer: MEDICARE

## 2024-02-13 VITALS
TEMPERATURE: 98 F | OXYGEN SATURATION: 100 % | HEART RATE: 68 BPM | DIASTOLIC BLOOD PRESSURE: 80 MMHG | SYSTOLIC BLOOD PRESSURE: 134 MMHG | RESPIRATION RATE: 16 BRPM | BODY MASS INDEX: 33.39 KG/M2 | WEIGHT: 200.4 LBS | HEIGHT: 65 IN

## 2024-02-13 DIAGNOSIS — G89.29 CHRONIC PAIN OF LEFT KNEE: ICD-10-CM

## 2024-02-13 DIAGNOSIS — Z74.09 IMPAIRED MOBILITY: ICD-10-CM

## 2024-02-13 DIAGNOSIS — Z91.81 RISK FOR FALLS: ICD-10-CM

## 2024-02-13 DIAGNOSIS — M25.562 CHRONIC PAIN OF LEFT KNEE: ICD-10-CM

## 2024-02-13 DIAGNOSIS — R05.1 ACUTE COUGH: ICD-10-CM

## 2024-02-13 DIAGNOSIS — I10 PRIMARY HYPERTENSION: ICD-10-CM

## 2024-02-13 DIAGNOSIS — T78.40XD ALLERGY, SUBSEQUENT ENCOUNTER: ICD-10-CM

## 2024-02-13 PROCEDURE — 99214 OFFICE O/P EST MOD 30 MIN: CPT

## 2024-02-13 PROCEDURE — 3075F SYST BP GE 130 - 139MM HG: CPT

## 2024-02-13 PROCEDURE — 3079F DIAST BP 80-89 MM HG: CPT

## 2024-02-13 RX ORDER — CETIRIZINE HYDROCHLORIDE 10 MG/1
10 TABLET ORAL DAILY
Qty: 100 TABLET | Refills: 3 | Status: SHIPPED | OUTPATIENT
Start: 2024-02-13

## 2024-02-13 ASSESSMENT — ENCOUNTER SYMPTOMS
FEVER: 0
CHILLS: 0
SHORTNESS OF BREATH: 0
PALPITATIONS: 0
ORTHOPNEA: 0
COUGH: 0

## 2024-02-13 ASSESSMENT — FIBROSIS 4 INDEX: FIB4 SCORE: 1.2

## 2024-02-13 NOTE — PROGRESS NOTES
Subjective:     CC: Follow-Up (Q1M, vertigo x 1 month/)      HPI:   Mimi is a 78 y.o. female who presents today for:    Impaired mobility: currently has a 4 wheel wheelchair with a seat. Her breaks are broken and she is requesting a new walker. She cannot ambulate without it. She has had falls recently, and almost fell twice in the last week.      Cough: she has been experiencing a cough once a day, and irritation in her eyes. She feels like she needs to blow her nose.     Hypertension: blood pressure has improved with starting carvedilol again. Tolerating it well.       Allergies: Penicillins, Risperdal [benzoic acid-risperidone], Lima beans, Atenolol, Chlorpheniramine, and Peach [prunus persica]     Medications:   Current Outpatient Medications:     cetirizine (ZYRTEC) 10 MG Tab, Take 1 Tablet by mouth every day., Disp: 100 Tablet, Rfl: 3    Ascorbic Acid (VITAMIN C) 1000 MG Tab, Take  by mouth., Disp: , Rfl:     allopurinol (ZYLOPRIM) 100 MG Tab, Take 1 Tablet by mouth every day. Indications: Gout, Disp: 100 Tablet, Rfl: 3    carvedilol (COREG) 6.25 MG Tab, Take 1 Tablet by mouth 2 times a day with meals., Disp: 200 Tablet, Rfl: 3    amLODIPine (NORVASC) 5 MG Tab, Take 1 Tablet by mouth every day., Disp: 90 Tablet, Rfl: 3    Multiple Vitamins-Minerals (CENTRUM WOMEN PO), Take 1 Tablet by mouth every day., Disp: , Rfl:     Cholecalciferol (VITAMIN D3 PO), Take 1 Tablet by mouth every day., Disp: , Rfl:     acetaminophen (TYLENOL) 325 MG Tab, Take 650 mg by mouth every 6 hours as needed for Fever or Mild Pain. Indications: Pain, Disp: , Rfl:     aspirin EC (ECOTRIN) 81 MG Tablet Delayed Response, Take 81 mg by mouth every day. Indications: Stroke Due To Limited Blood Flow, Disp: , Rfl:       ROS:  Review of Systems   Constitutional:  Negative for chills and fever.   Respiratory:  Negative for cough and shortness of breath.    Cardiovascular:  Negative for chest pain, palpitations, orthopnea and leg swelling.  "      Objective:     Exam:  /80   Pulse 68   Temp 36.7 °C (98 °F)   Resp 16   Ht 1.651 m (5' 5\")   Wt 90.9 kg (200 lb 6.4 oz)   SpO2 100%   BMI 33.35 kg/m²  Body mass index is 33.35 kg/m².    Physical Exam  Constitutional:       Appearance: Normal appearance.   Eyes:      Pupils: Pupils are equal, round, and reactive to light.   Cardiovascular:      Rate and Rhythm: Normal rate and regular rhythm.      Pulses: Normal pulses.      Heart sounds: Normal heart sounds.   Pulmonary:      Effort: Pulmonary effort is normal.      Breath sounds: Normal breath sounds.   Abdominal:      General: Bowel sounds are normal.      Palpations: Abdomen is soft.   Neurological:      Mental Status: She is alert and oriented to person, place, and time.   Psychiatric:         Mood and Affect: Mood normal.         Behavior: Behavior normal.         Assessment & Plan:     Mimi william 78 y.o. female with the following -     1. Chronic pain of left knee  2. Risk for falls  3. Impaired mobility  Chronic, stable  Increase activity as tolerated. Will order new walker to [facility] (patient request), she needs a Rollater walker.   - DME Walker    4. Acute cough  Acute, uncomplicated  May be allergies or possible viral cold. Recommend trying OTC antihistamine. Follow up if symptoms do not improve.     5. Primary hypertension  Chronic, stable  - Continue carvedilol (COREG) 6.25 MG Tab, Take 1 Tablet by mouth 2 times a day with meals., Disp: 200 Tablet, Rfl: 3  - Continue amLODIPine (NORVASC) 5 MG Tab, Take 1 Tablet by mouth every day., Disp: 90 Tablet, Rfl: 3    6. Allergies  Chronic, unstable  Start taking Zyrtec again.   - cetirizine (ZYRTEC) 10 MG Tab, Take 1 Tablet by mouth every day., Disp: 100 Tablet, Rfl: 3    Anticipatory guidance included the following: Patient counseled about skin care, diet, supplements, smoking, drugs/alcohol use, safe sex and exercise.     Return in about 3 months (around 5/13/2024) for Annual wellness " visit.    Please note that this dictation was created using voice recognition software. I have made every reasonable attempt to correct obvious errors, but I expect that there are errors of grammar and possibly content that I did not discover before finalizing the note.

## 2024-02-23 PROBLEM — I73.9 PERIPHERAL VASCULAR DISEASE, UNSPECIFIED (HCC): Status: ACTIVE | Noted: 2024-02-23

## 2024-02-27 ENCOUNTER — APPOINTMENT (OUTPATIENT)
Dept: PHYSICAL THERAPY | Facility: MEDICAL CENTER | Age: 79
End: 2024-02-27
Payer: MEDICARE

## 2024-03-05 ENCOUNTER — APPOINTMENT (OUTPATIENT)
Dept: PHYSICAL THERAPY | Facility: MEDICAL CENTER | Age: 79
End: 2024-03-05
Payer: MEDICARE

## 2024-03-12 ENCOUNTER — APPOINTMENT (OUTPATIENT)
Dept: PHYSICAL THERAPY | Facility: MEDICAL CENTER | Age: 79
End: 2024-03-12
Payer: MEDICARE

## 2024-03-19 ENCOUNTER — APPOINTMENT (OUTPATIENT)
Dept: PHYSICAL THERAPY | Facility: MEDICAL CENTER | Age: 79
End: 2024-03-19
Payer: MEDICARE

## 2024-03-26 ENCOUNTER — APPOINTMENT (OUTPATIENT)
Dept: PHYSICAL THERAPY | Facility: MEDICAL CENTER | Age: 79
End: 2024-03-26
Payer: MEDICARE

## 2024-04-01 ENCOUNTER — APPOINTMENT (OUTPATIENT)
Dept: PHYSICAL THERAPY | Facility: REHABILITATION | Age: 79
End: 2024-04-01
Payer: MEDICARE

## 2024-04-02 ENCOUNTER — APPOINTMENT (OUTPATIENT)
Dept: PHYSICAL THERAPY | Facility: MEDICAL CENTER | Age: 79
End: 2024-04-02
Payer: MEDICARE

## 2024-04-03 NOTE — OP THERAPY EVALUATION
Outpatient Physical Therapy  INITIAL EVALUATION    [facility] Physical Therapy [address].  [address]  [place] [address]  Phone:  [phone]  Fax:  [phone]    Date of Evaluation: 04/04/2024    Patient: Mimi Appiah  YOB: 1945  MRN: [id number]     Referring Provider: ROCIO Delacruzgle [facility] [address]  [place] [address]   Referring Diagnosis Fall on same level, unspecified, initial encounter [W18.30XA]     Time Calculation  Start time: 1316  Stop time: 1359 Time Calculation (min): 43 minutes         Chief Complaint: Back Problem and Loss Of Balance    Visit Diagnoses     ICD-10-CM   1. Ground-level fall  W18.30XA   2. Left hip pain  M25.552       Date of onset of impairment: 4/4/2024    Subjective:   History of Present Illness:     Mechanism of injury:  She got a new mattress and has trouble getting in and out of the bed. She feels like she is falling because of her balance and states that she has vertigo. She feels off balance when she is sitting down or standing up, getting in and out of bed, and during bathing. She feels dizzy when she lies down and has her head back. She hasn't felt any dizziness while standing or walking. She feels like she needs to sit down because of her back pn but says it is a different pn than her hip pn. She feels like she would have better balance if she didn't have the back pn. In the last 12 months she has had 3 falls. She feels off balance when she stands a long time to cook or wash dishes. She has been sitting on the seat of her 4WW. She thinks it would be most beneficial to focus on the back pn and thinks that will help out more with her balance. She uses her automatic W/c when in the community/store, walker for apt's/checking the mail/taking out the trash, and SPC around the house.       Aggs:  Not being able to take a bath  Stepping up a curb  Standing for 10-15 mins, has to sit d/t back pn      HPI:  "12/19/23  \"Mimi is a 78 y.o. female who presents today for:  Ground level fall/ left hip pain: She reports that she had a fall about 2 weeks ago. She was discharged from the hospital on 10/4-10/7.  She has been experiencing a lot of left hip pain. She reports she was sitting in her chair at home and woke up on the ground. She states she is uncertain how long she had LOC, she was on the ground for 2 hours before she could get herself up. She is able to ambulate, she uses a cane or a walker. She states she has been using OCT medications, but states that they aren't helping with the pain. She has been feeling over all better the last few days since her fall. The pain has been getting better.  No trouble with ROM.   Dysarthria: she reports she has had trouble with finding her words for the last 3 months, although she is able to express herself clearly today. She has also had some trouble with short term memory.   Hypertension: blood pressure elevated today, 146/100. Her blood pressure medication was discontinued when she was in the hospital.  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  Ultrasound showed, common bile duct dilation, proximal echogenic focus in the common bile duct likely common bile duct stone.  Thickened shadowing anterior gallbladder wall, compatible with porcelain gallbladder, appearance raising concern for cholecystitis but indeterminant given porcelain gallbladder.  It was explained to her by the gastroenterologist in the hospital that it would be recommended that she undergo an MRCP to remove the gallstone, and discuss potential cholecystectomy due to the increased risk of cancer from porcelain gallbladder.  Patient declined.  She states she is not interested in surgery again today, and states she understands the risks.\"    Patient Goals:     Other patient goals:  Cook for herself, bathing herself, reduce back pn      Past Medical History: " "  Diagnosis Date    Abnormal Pap smear     Abnormal Pap smear 04/11/2012    1970 precancerous lesions treated with cyro    Abnormality of hormone 08/14/2020    Allergic rhinitis     BMI 40.0-44.9, adult (HCC) 07/01/2022    Congestion of nasal sinus 1/7/2020    Encounter for screening for malignant neoplasm of breast 04/11/2012    Preventative Health (Women): Tetanus - ? Pneumovax - 2005  Flu shot - 10/12 Stool - 2005  Pap Smear - 2009, precancer of cervix 1970 - treated with cryo.  Decline further Mammogram - none Breast Exam - none Dexa Scan - none Colonscopy - 2005, was done in [facility].  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    Sinusitis 04/23/2020    Standard chest x-ray abnormal 10/04/2016    Stroke ([facility])     Unspecified fall, initial encounter 09/11/2018     Past Surgical History:   Procedure Laterality Date    TUBAL COAGULATION LAPAROSCOPIC BILATERAL       Social History     Tobacco Use    Smoking status: Some Days     Types: Cigarettes    Smokeless tobacco: Never   Substance Use Topics    Alcohol use: No     Family and Occupational History     Socioeconomic History    Marital status:      Spouse name: Not on file    Number of children: Not on file    Years of education: Not on file    Highest education level: Not on file   Occupational History    Not on file       Objective     Static Posture     Comments  L/s AROM:  Flex: not tested today  Ext: sig limited 0%, no pn  LLF: 30% limited, no pn  RLF: 30% limited, no pn    5xSTS: 38\" no UE, lever on table to lower back down, " "momentum swing     TU\" use of 4WW    10MWT: 15\" used 4WW  0.67 m/s    Gait: 4WW, forward flex, slowed gait speed, shortened step and stride length, out of box of 4WW-reaching, poor safety awareness    Sit<>stand transfer: LE lever against chair, BUE on walker, poor safety awareness    Posture: post center of gravity        Therapeutic Exercises (CPT 99501):     3. sit to stand, x5, x10 reps 2x a day, 4x a week for HEP    19. Certification Period: 2024 to  24      Therapeutic Exercise Summary: No handout given today, pt says she can remember   Educated pt in safety w/ STS w/ w/c as seat in locked position and 4WW in front of her. Educated in hand placement and eccentric control      Time-based treatments/modalities:    Physical Therapy Timed Treatment Charges  Therapeutic exercise minutes (CPT 44172): 8 minutes      Assessment, Response and Plan:   Impairments: abnormal ADL function, abnormal gait, abnormal or restricted ROM, activity intolerance, impaired functional mobility, impaired balance, impaired physical strength, lacks appropriate home exercise program, limited ADL's, limited mobility and safety issue    Assessment details:  Pt is a 79 y/o female who presents to PT w/ global weakness, deconditioing, balance deficits, and poor safety awarenss contributing to her high fall risk and recent GLF. She presents to PT w/ 5xSTS of 38\", TUG of 35\", gait speed of 0.67 m/s which are all indicative of inc'd fall risk, limited l/s ROM in all directions, off balance feeling w/ standing, reports of back and hip pn when standing, abnormal gait, use of 4WW, poor safety awareness, post center of gravity, LE lever for sit<>stand transfers, and overall poor safety awareness, that are limiting her in bathing herself, stepping up a curb, and standing for more than 15 mins to perform ADLs. Pt is appropriate and will benefit from cont PT at this time in order to address global weakness, safety awareness, and reach " functional goals.   Barriers to therapy:  Comorbidities, transportation, psychosocial and age  Prognosis: good    Goals:   Short Term Goals:   Pt will demo compliance w/ HEP  Pt will tolerate standing to cook a meal for 20-30 mins safely w/o needing to sit down  Pt will demo sit<>stand transfer w/ no compensations and good eccentric control  Pt will demo standing narrow JERRY for 30 secs w/o LOB w/ supervision  Short term goal time span:  6-8 weeks      Long Term Goals:    Pt will improve 5xSTS score to <25 seconds w/ improved quality   Pt will improve TUG score to <22 seconds  Pt will improve 10MWT to >.85 m/s  Pt will safely transfer in and out of bath w/ use of shower chair  Long term goal time span:  2-4 months    Plan:   Therapy options:  Physical therapy treatment to continue  Planned therapy interventions:  Gait Training (CPT 92390), Neuromuscular Re-education (CPT 61758), Manual Therapy (CPT 21732), E Stim Unattended (CPT 26775), Therapeutic Activities (CPT 28866) and Therapeutic Exercise (CPT 94397)  Frequency:  2x week  Duration in weeks:  12  Duration in visits:  20  Discussed with:  Patient  Plan details:  Cardiovascular endurance, global LE strength, gait training, improve safety awareness      Functional Assessment Used  PT Functional Assessment Tool Used: ABC Scale  PT Functional Assessment Score: 5.6%     Referring provider co-signature:  I have reviewed this plan of care and my co-signature certifies the need for services.    Certification Period: 04/04/2024 to  06/27/24    Physician Signature: ________________________________ Date: ______________       [x] As the licensed therapist supervising this student, I was present during the entire treatment session directing the care and reviewing the assessment plan.  I reviewed all documentation prior to signing.    The following changes or alternations were made by the licensed therapist.

## 2024-04-04 ENCOUNTER — PHYSICAL THERAPY (OUTPATIENT)
Dept: PHYSICAL THERAPY | Facility: REHABILITATION | Age: 79
End: 2024-04-04
Payer: MEDICARE
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
## 2024-04-04 DIAGNOSIS — M25.552 LEFT HIP PAIN: ICD-10-CM

## 2024-04-04 PROCEDURE — 97163 PT EVAL HIGH COMPLEX 45 MIN: CPT

## 2024-04-04 PROCEDURE — 97110 THERAPEUTIC EXERCISES: CPT

## 2024-04-04 ASSESSMENT — ACTIVITIES OF DAILY LIVING (ADL): POOR_BALANCE: 1

## 2024-04-09 ENCOUNTER — APPOINTMENT (OUTPATIENT)
Dept: PHYSICAL THERAPY | Facility: REHABILITATION | Age: 79
End: 2024-04-09
Payer: MEDICARE

## 2024-04-09 ENCOUNTER — APPOINTMENT (OUTPATIENT)
Dept: PHYSICAL THERAPY | Facility: MEDICAL CENTER | Age: 79
End: 2024-04-09
Payer: MEDICARE

## 2024-04-11 ENCOUNTER — PHYSICAL THERAPY (OUTPATIENT)
Dept: PHYSICAL THERAPY | Facility: REHABILITATION | Age: 79
End: 2024-04-11
Payer: MEDICARE
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
## 2024-04-11 DIAGNOSIS — M25.552 LEFT HIP PAIN: ICD-10-CM

## 2024-04-11 PROCEDURE — 97110 THERAPEUTIC EXERCISES: CPT

## 2024-04-11 PROCEDURE — 97116 GAIT TRAINING THERAPY: CPT

## 2024-04-11 NOTE — OP THERAPY DAILY TREATMENT
"  Outpatient Physical Therapy  DAILY TREATMENT     [facility] Physical [address].  [address]  [place] [address]  Phone:  [phone]  Fax:  [phone]    Date: 2024    Patient: Mimi Appiah  YOB: 1945  MRN: [id number]     Time Calculation    Start time: 1315  Stop time: 1400 Time Calculation (min): 45 minutes         Chief Complaint: No chief complaint on file.    Visit #: 2    SUBJECTIVE:  She did the sit to stands at home and felt good with them. She found a good spot at home. She had some slight hip pn on the right side during the last week but she said it was only once and it went away. She is feeling good today overall.       Aggs:  Not being able to take a bath  Stepping up a curb  Standing for 10-15 mins, has to sit d/t back pn    OBJECTIVE:  Comments  L/s AROM:  Flex: not tested today  Ext: sig limited 0%, no pn  LLF: 30% limited, no pn  RLF: 30% limited, no pn     5xSTS: 38\" no UE, lever on table to lower back down, momentum swing      TU\" use of 4WW     10MWT: 15\" used 4WW  0.67 m/s     Gait: 4WW, forward flex, slowed gait speed, shortened step and stride length, out of box of 4WW-reaching, poor safety awareness     Sit<>stand transfer: LE lever against chair, BUE on walker, poor safety awareness     Posture: post center of gravity            Therapeutic Exercises (CPT 01634):     1. nustep, 10\" L2    3. sit to stand, x5, NT    4. stand march, 2x10    5. stand hip abd, 2x10    6. stand hip ext, 2x10    19. Certification Period: 2024 to  24      Therapeutic Exercise Summary: Access Code: [email or web address]  URL: [email or web address]  Date: 2024  Prepared by: Aparna Beach    Exercises  - Sit to Stand with Armchair  - 2 x daily - 4 x weekly - 10 reps  - Standing Hip Abduction  - 1 x daily - 4 x weekly - 2 sets - 10 reps  - Standing Hip Extension with Counter Support  - 1 x daily - 4 x weekly - 2 sets - 10 reps  - Standing March " with Counter Support  - 1 x daily - 4 x weekly - 2 sets - 10 reps    Therapeutic Treatments and Modalities:     1. Gait Training (CPT 39874), see below    Therapeutic Treatment and Modalities Summary: Trialed ambulation w/ SPC on R side, encouraged pt to inc gait speed and maintain upward gaze. Pt did have some buckling of the knees, and cont to have a slowed gait speed, pt had hard time maintaining upward gaze. Pt still unsafe to use SPC, pt instructed to cont using 4WW for all ambulation.     Time-based treatments/modalities:    Physical Therapy Timed Treatment Charges  Gait training minutes (CPT 59165): 10 minutes  Therapeutic exercise minutes (CPT 86650): 35 minutes      ASSESSMENT:   Started on nustep today and pt tolerated well w/ no issues. Trialed ambulation w/ SPC, pt unsafe to use SPC, encouraged to cont using 4WW at all times. Pt tolerated standing exercises today, felt a good muscle burn but did not have any inc'd pn. Pt educated on dosing at home w/ updated HEP. Pt is appropriate and will cont to benefit from PT at this time.      Goals:   Short Term Goals:   Pt will demo compliance w/ HEP  Pt will tolerate standing to cook a meal for 20-30 mins safely w/o needing to sit down  Pt will demo sit<>stand transfer w/ no compensations and good eccentric control  Pt will demo standing narrow JERRY for 30 secs w/o LOB w/ supervision  Short term goal time span:  6-8 weeks      Long Term Goals:    Pt will improve 5xSTS score to <25 seconds w/ improved quality   Pt will improve TUG score to <22 seconds  Pt will improve 10MWT to >.85 m/s  Pt will safely transfer in and out of bath w/ use of shower chair  Long term goal time span:  2-4 months    PLAN/RECOMMENDATIONS:   Cardiovascular endurance, global LE strength, gait training, improve safety awareness       [x] As the licensed therapist supervising this student, I was present during the entire treatment session directing the care and reviewing the assessment plan.   I reviewed all documentation prior to signing.    The following changes or alternations were made by the licensed therapist.

## 2024-04-16 ENCOUNTER — APPOINTMENT (OUTPATIENT)
Dept: PHYSICAL THERAPY | Facility: REHABILITATION | Age: 79
End: 2024-04-16
Payer: MEDICARE

## 2024-04-16 ENCOUNTER — APPOINTMENT (OUTPATIENT)
Dept: PHYSICAL THERAPY | Facility: MEDICAL CENTER | Age: 79
End: 2024-04-16
Payer: MEDICARE

## 2024-04-18 ENCOUNTER — APPOINTMENT (OUTPATIENT)
Dept: PHYSICAL THERAPY | Facility: REHABILITATION | Age: 79
End: 2024-04-18
Payer: MEDICARE

## 2024-04-19 NOTE — OP THERAPY DAILY TREATMENT
"  Outpatient Physical Therapy  DAILY TREATMENT     [facility] Physical [address].  [address]  [place] [address]  Phone:  [phone]  Fax:  [phone]    Date: 2024    Patient: Mimi Appiah  YOB: 1945  MRN: [id number]     Time Calculation                   Chief Complaint: No chief complaint on file.    Visit #: 3    SUBJECTIVE:  She did the sit to stands at home and felt good with them. She found a good spot at home. She had some slight hip pn on the right side during the last week but she said it was only once and it went away. She is feeling good today overall.       Aggs:  Not being able to take a bath  Stepping up a curb  Standing for 10-15 mins, has to sit d/t back pn    OBJECTIVE:  Comments  L/s AROM:  Flex: not tested today  Ext: sig limited 0%, no pn  LLF: 30% limited, no pn  RLF: 30% limited, no pn     5xSTS: 38\" no UE, lever on table to lower back down, momentum swing      TU\" use of 4WW     10MWT: 15\" used 4WW  0.67 m/s     Gait: 4WW, forward flex, slowed gait speed, shortened step and stride length, out of box of 4WW-reaching, poor safety awareness     Sit<>stand transfer: LE lever against chair, BUE on walker, poor safety awareness     Posture: post center of gravity            Therapeutic Exercises (CPT 29356):     1. nustep, 10\" L2    3. sit to stand, x5, NT    4. stand march, 2x10    5. stand hip abd, 2x10    6. stand hip ext, 2x10    19. Certification Period: 2024 to  24      Therapeutic Exercise Summary: Access Code: [email or web address]  URL: [email or web address]  Date: 2024  Prepared by: Aparna Beach    Exercises  - Sit to Stand with Armchair  - 2 x daily - 4 x weekly - 10 reps  - Standing Hip Abduction  - 1 x daily - 4 x weekly - 2 sets - 10 reps  - Standing Hip Extension with Counter Support  - 1 x daily - 4 x weekly - 2 sets - 10 reps  - Standing March with Counter Support  - 1 x daily - 4 x weekly - 2 sets - 10 " reps    Therapeutic Treatments and Modalities:     1. Gait Training (CPT 19857), see below    Therapeutic Treatment and Modalities Summary: Trialed ambulation w/ SPC on R side, encouraged pt to inc gait speed and maintain upward gaze. Pt did have some buckling of the knees, and cont to have a slowed gait speed, pt had hard time maintaining upward gaze. Pt still unsafe to use SPC, pt instructed to cont using 4WW for all ambulation.     Time-based treatments/modalities:           ASSESSMENT:   Started on nustep today and pt tolerated well w/ no issues. Trialed ambulation w/ SPC, pt unsafe to use SPC, encouraged to cont using 4WW at all times. Pt tolerated standing exercises today, felt a good muscle burn but did not have any inc'd pn. Pt educated on dosing at home w/ updated HEP. Pt is appropriate and will cont to benefit from PT at this time.      Goals:   Short Term Goals:   Pt will demo compliance w/ HEP  Pt will tolerate standing to cook a meal for 20-30 mins safely w/o needing to sit down  Pt will demo sit<>stand transfer w/ no compensations and good eccentric control  Pt will demo standing narrow JERRY for 30 secs w/o LOB w/ supervision  Short term goal time span:  6-8 weeks      Long Term Goals:    Pt will improve 5xSTS score to <25 seconds w/ improved quality   Pt will improve TUG score to <22 seconds  Pt will improve 10MWT to >.85 m/s  Pt will safely transfer in and out of bath w/ use of shower chair  Long term goal time span:  2-4 months    PLAN/RECOMMENDATIONS:   Cardiovascular endurance, global LE strength, gait training, improve safety awareness       [x] As the licensed therapist supervising this student, I was present during the entire treatment session directing the care and reviewing the assessment plan.  I reviewed all documentation prior to signing.    The following changes or alternations were made by the licensed therapist.

## 2024-04-22 ENCOUNTER — TELEPHONE (OUTPATIENT)
Dept: PHYSICAL THERAPY | Facility: REHABILITATION | Age: 79
End: 2024-04-22
Payer: MEDICARE

## 2024-04-22 NOTE — OP THERAPY DISCHARGE SUMMARY
Outpatient Physical Therapy  DISCHARGE SUMMARY NOTE      [facility] Physical Therapy [address].  [address]  [place] [address]  Phone:  [phone]  Fax:  [phone]    Date of Visit: 04/22/2024    Patient: Mimi Appiah  YOB: 1945  MRN: [id number]     Referring Provider: ESME Delacruz    Referring Diagnosis Fall on same level, unspecified, initial encounter [W18.30XA]        Your patient is being discharged from Physical Therapy with the following comments:   Goals not met    Comments:  Pt is self discharged at this time as they have requested to cancel all future appointments as she states she does not have safe transportation at this time. Pt was advised in previous sessions to contact PT as needed w/ any questions or concerns. Pt is anticipated to progress w/ adherence to HEP but future PT may be beneficial when ready to return.        Aparna Beach, PT    Date: 4/22/2024

## 2024-04-23 ENCOUNTER — OFFICE VISIT (OUTPATIENT)
Dept: MEDICAL GROUP | Facility: MEDICAL CENTER | Age: 79
End: 2024-04-23
Payer: MEDICARE

## 2024-04-23 ENCOUNTER — APPOINTMENT (OUTPATIENT)
Dept: PHYSICAL THERAPY | Facility: REHABILITATION | Age: 79
End: 2024-04-23
Payer: MEDICARE

## 2024-04-23 ENCOUNTER — APPOINTMENT (OUTPATIENT)
Dept: PHYSICAL THERAPY | Facility: MEDICAL CENTER | Age: 79
End: 2024-04-23
Payer: MEDICARE

## 2024-04-23 VITALS
BODY MASS INDEX: 32.76 KG/M2 | DIASTOLIC BLOOD PRESSURE: 74 MMHG | HEART RATE: 75 BPM | RESPIRATION RATE: 16 BRPM | WEIGHT: 196.6 LBS | SYSTOLIC BLOOD PRESSURE: 128 MMHG | TEMPERATURE: 98.3 F | HEIGHT: 65 IN | OXYGEN SATURATION: 99 %

## 2024-04-23 DIAGNOSIS — J41.0 SMOKERS' COUGH (HCC): ICD-10-CM

## 2024-04-23 DIAGNOSIS — R05.1 ACUTE COUGH: ICD-10-CM

## 2024-04-23 DIAGNOSIS — F17.200 TOBACCO DEPENDENCE: ICD-10-CM

## 2024-04-23 LAB
FLUAV RNA SPEC QL NAA+PROBE: NEGATIVE
FLUBV RNA SPEC QL NAA+PROBE: NEGATIVE
RSV RNA SPEC QL NAA+PROBE: NEGATIVE
SARS-COV-2 RNA RESP QL NAA+PROBE: NEGATIVE

## 2024-04-23 PROCEDURE — 99214 OFFICE O/P EST MOD 30 MIN: CPT | Performed by: FAMILY MEDICINE

## 2024-04-23 PROCEDURE — 0241U POCT CEPHEID COV-2, FLU A/B, RSV - PCR: CPT | Performed by: FAMILY MEDICINE

## 2024-04-23 PROCEDURE — 3078F DIAST BP <80 MM HG: CPT | Performed by: FAMILY MEDICINE

## 2024-04-23 PROCEDURE — 3074F SYST BP LT 130 MM HG: CPT | Performed by: FAMILY MEDICINE

## 2024-04-23 RX ORDER — AZITHROMYCIN 250 MG/1
TABLET, FILM COATED ORAL
Qty: 6 TABLET | Refills: 0 | Status: SHIPPED | OUTPATIENT
Start: 2024-04-23

## 2024-04-23 RX ORDER — ALBUTEROL SULFATE 90 UG/1
2 AEROSOL, METERED RESPIRATORY (INHALATION) EVERY 4 HOURS PRN
Qty: 1 EACH | Refills: 0 | Status: SHIPPED | OUTPATIENT
Start: 2024-04-23

## 2024-04-23 RX ORDER — BENZONATATE 100 MG/1
100 CAPSULE ORAL 3 TIMES DAILY PRN
Qty: 21 CAPSULE | Refills: 0 | Status: SHIPPED | OUTPATIENT
Start: 2024-04-23 | End: 2024-04-30

## 2024-04-23 ASSESSMENT — FIBROSIS 4 INDEX: FIB4 SCORE: 1.2

## 2024-04-23 NOTE — PROGRESS NOTES
CC: Cough x 3 days, nasal congestion for 10 days    HPI:   Mimi presents today because she has been having cough for 3 days that has been annoying and continuous.  Reported a productive cough with a clear phlegm.  Denies any fever, chills, or headache.  However patient has been having some nasal congestion for the past 10 days.  Denies any shortness of breath.  Patient is a chronic smoker, she smokes 5 to 6 cigarettes a day, and she has been smoking since she was 13 yo.  No previous pulmonary function test.      Patient Active Problem List    Diagnosis Date Noted    Peripheral vascular disease, unspecified ([facility]) 02/23/2024    Obesity (BMI 30-39.9) 01/19/2024    Atherosclerosis of aorta (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) 04/20/2023    Risk for falls 01/18/2023    Sciatica of left side 07/08/2022    BMI 33.0-33.9,adult 07/01/2022    Environmental and seasonal allergies 03/15/2022    Hypovitaminosis D 12/11/2020    MDD (recurrent major depressive disorder) in remission ([facility]) 11/20/2020    Latent tuberculosis 09/25/2020    Macrocytosis 08/14/2020    Elevated alkaline phosphatase level 07/30/2020    Nonspecific reaction to cell mediated immunity measurement of gamma interferon antigen response without active tuberculosis 07/30/2020    Lower extremity weakness 10/16/2019    Chronic pain of right knee 02/11/2019    Constipation 12/12/2018    Gout 11/15/2018    Gingivitis 11/09/2017    Leg pain, bilateral 09/05/2017    Tobacco dependence syndrome 09/05/2017    History of CVA (cerebrovascular accident) 12/30/2016    Dyslipidemia 06/08/2016     "Vertebral artery occlusion 05/13/2016    Impaired glucose tolerance 11/05/2014    Neck pain, chronic 06/25/2014    Vertigo 06/18/2014    Hypertension 04/11/2012    Low back pain 04/11/2012    Allergic rhinitis 04/11/2012    Mixed anxiety depressive disorder 01/01/1960       Current Outpatient Medications   Medication Sig Dispense Refill    cetirizine (ZYRTEC) 10 MG Tab Take 1 Tablet by mouth every day. 100 Tablet 3    Ascorbic Acid (VITAMIN C) 1000 MG Tab Take  by mouth.      allopurinol (ZYLOPRIM) 100 MG Tab Take 1 Tablet by mouth every day. Indications: Gout 100 Tablet 3    carvedilol (COREG) 6.25 MG Tab Take 1 Tablet by mouth 2 times a day with meals. 200 Tablet 3    amLODIPine (NORVASC) 5 MG Tab Take 1 Tablet by mouth every day. 90 Tablet 3    Multiple Vitamins-Minerals (CENTRUM WOMEN PO) Take 1 Tablet by mouth every day.      Cholecalciferol (VITAMIN D3 PO) Take 1 Tablet by mouth every day.      acetaminophen (TYLENOL) 325 MG Tab Take 650 mg by mouth every 6 hours as needed for Fever or Mild Pain. Indications: Pain      aspirin EC (ECOTRIN) 81 MG Tablet Delayed Response Take 81 mg by mouth every day. Indications: Stroke Due To Limited Blood Flow       No current facility-administered medications for this visit.         Allergies as of 04/23/2024 - Reviewed 02/23/2024   Allergen Reaction Noted    Penicillins Swelling 10/04/2009    Risperdal [benzoic acid-risperidone] Unspecified 12/17/2021    Lima beans Rash and Itching 05/14/2016    Atenolol Swelling 04/11/2012    Chlorpheniramine Unspecified 10/04/2009    Peach [prunus persica] Nausea 05/14/2016        ROS: Denies any chest pain, Shortness of breath, Changes bowel or bladder, Lower extremity edema.    Physical Exam:  /74 (BP Location: Right arm, Patient Position: Sitting, BP Cuff Size: Adult)   Pulse 75   Temp 36.8 °C (98.3 °F) (Temporal)   Resp 16   Ht 1.651 m (5' 5\")   Wt 89.2 kg (196 lb 9.6 oz)   SpO2 99%   BMI 32.72 kg/m²   Gen.: " Well-developed, well-nourished, no apparent distress,pleasant and cooperative with the examination  Skin:  Warm and dry with good turgor. No rashes or suspicious lesions in visible areas  HEENT:Sinuses nontender with palpation, TMs clear, nares patent with pink mucosa and clear rhinorrhea,no septal deviation ,polyps or lesions. lips without lesions, oropharynx clear.  Neck: Trachea midline,no masses or adenopathy. No JVD.  Cor: Regular rate and rhythm without murmur, gallop or rub.  Lungs: Respirations unlabored.Clear to auscultation with equal breath sounds bilaterally. No wheezes, rhonchi.  Extremities: No cyanosis, clubbing or edema.          Assessment and Plan.   78 y.o. female     1. Acute cough  POCT COVID, flu, and RSV are negative  Possibly bronchitis (Patient is a chronic smoker).  Will start patient on Z-Jason, albuterol as needed for wheeze or shortness of breath, antitussive as needed.  Recommend hydration, cough drops, using humidifier.  Return to the clinic if the condition persists or gets worse    - POCT CoV-2, Flu A/B, RSV by PCR  - azithromycin (ZITHROMAX) 250 MG Tab; 500 mg( 2 tablets) the first day, daily 250 mg (one tablet)daily for 4 days.  Dispense: 6 Tablet; Refill: 0  - albuterol 108 (90 Base) MCG/ACT Aero Soln inhalation aerosol; Inhale 2 Puffs every four hours as needed for Shortness of Breath.  Dispense: 1 Each; Refill: 0  - benzonatate (TESSALON) 100 MG Cap; Take 1 Capsule by mouth 3 times a day as needed for Cough for up to 7 days.  Dispense: 21 Capsule; Refill: 0    2. Tobacco dependence  3. Smokers' cough (HCC)  Patient is a chronic smoker, smokes about 5 to 6 cigarettes a day for more than 60 years.  Rule out COPD.  Send patient for pulm function test      - PULMONARY FUNCTION TESTS -Test requested: Complete Pulmonary Function Test; Future

## 2024-04-25 ENCOUNTER — APPOINTMENT (OUTPATIENT)
Dept: PHYSICAL THERAPY | Facility: REHABILITATION | Age: 79
End: 2024-04-25
Payer: MEDICARE

## 2024-04-30 ENCOUNTER — APPOINTMENT (OUTPATIENT)
Dept: PHYSICAL THERAPY | Facility: REHABILITATION | Age: 79
End: 2024-04-30
Payer: MEDICARE

## 2024-05-15 ENCOUNTER — OFFICE VISIT (OUTPATIENT)
Dept: MEDICAL GROUP | Facility: MEDICAL CENTER | Age: 79
End: 2024-05-15
Payer: MEDICARE

## 2024-05-15 VITALS
SYSTOLIC BLOOD PRESSURE: 132 MMHG | DIASTOLIC BLOOD PRESSURE: 84 MMHG | HEART RATE: 71 BPM | OXYGEN SATURATION: 99 % | TEMPERATURE: 97.9 F | BODY MASS INDEX: 32.22 KG/M2 | WEIGHT: 193.4 LBS | HEIGHT: 65 IN

## 2024-05-15 DIAGNOSIS — F17.200 TOBACCO DEPENDENCE: ICD-10-CM

## 2024-05-15 DIAGNOSIS — I70.0 ATHEROSCLEROSIS OF AORTA (HCC): ICD-10-CM

## 2024-05-15 DIAGNOSIS — G31.9 DEGENERATIVE DISEASE OF NERVOUS SYSTEM, UNSPECIFIED (HCC): ICD-10-CM

## 2024-05-15 DIAGNOSIS — I10 PRIMARY HYPERTENSION: ICD-10-CM

## 2024-05-15 DIAGNOSIS — N18.32 CKD STAGE 3B, GFR 30-44 ML/MIN: ICD-10-CM

## 2024-05-15 DIAGNOSIS — F33.40 MDD (RECURRENT MAJOR DEPRESSIVE DISORDER) IN REMISSION (HCC): ICD-10-CM

## 2024-05-15 DIAGNOSIS — E78.5 DYSLIPIDEMIA: ICD-10-CM

## 2024-05-15 DIAGNOSIS — Z00.00 ENCOUNTER FOR MEDICARE ANNUAL WELLNESS EXAM: ICD-10-CM

## 2024-05-15 DIAGNOSIS — I73.9 PERIPHERAL VASCULAR DISEASE, UNSPECIFIED (HCC): ICD-10-CM

## 2024-05-15 PROCEDURE — G0439 PPPS, SUBSEQ VISIT: HCPCS

## 2024-05-15 PROCEDURE — 3079F DIAST BP 80-89 MM HG: CPT

## 2024-05-15 PROCEDURE — 3075F SYST BP GE 130 - 139MM HG: CPT

## 2024-05-15 RX ORDER — AMLODIPINE BESYLATE 5 MG/1
5 TABLET ORAL DAILY
Qty: 100 TABLET | Refills: 3 | Status: SHIPPED | OUTPATIENT
Start: 2024-05-15 | End: 2024-05-21 | Stop reason: SDUPTHER

## 2024-05-15 RX ORDER — AMLODIPINE BESYLATE 5 MG/1
5 TABLET ORAL DAILY
Qty: 90 TABLET | Refills: 3 | Status: SHIPPED | OUTPATIENT
Start: 2024-05-15 | End: 2024-05-15 | Stop reason: SDUPTHER

## 2024-05-15 ASSESSMENT — PATIENT HEALTH QUESTIONNAIRE - PHQ9: CLINICAL INTERPRETATION OF PHQ2 SCORE: 0

## 2024-05-15 ASSESSMENT — ENCOUNTER SYMPTOMS: GENERAL WELL-BEING: FAIR

## 2024-05-15 ASSESSMENT — ACTIVITIES OF DAILY LIVING (ADL): BATHING_REQUIRES_ASSISTANCE: 1

## 2024-05-15 ASSESSMENT — FIBROSIS 4 INDEX: FIB4 SCORE: 1.2

## 2024-05-15 NOTE — PROGRESS NOTES
Chief Complaint   Patient presents with    Annual Exam       HPI:  Mimi Appiah is a 78 y.o. 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 09/20/2023    Degenerative disease of nervous system, unspecified ([facility]) 04/20/2023    Risk for falls 01/18/2023    Sciatica of left side 07/08/2022    BMI 33.0-33.9,adult 07/01/2022    Environmental and seasonal allergies 03/15/2022    Hypovitaminosis D 12/11/2020    MDD (recurrent major depressive disorder) in remission ([facility]) 11/20/2020    Latent tuberculosis 09/25/2020    Macrocytosis 08/14/2020    Elevated alkaline phosphatase level 07/30/2020    Nonspecific reaction to cell mediated immunity measurement of gamma interferon antigen response without active tuberculosis 07/30/2020    Lower extremity weakness 10/16/2019    Chronic pain of right knee 02/11/2019    Constipation 12/12/2018    Gout 11/15/2018    Gingivitis 11/09/2017    Leg pain, bilateral 09/05/2017    Tobacco dependence syndrome 09/05/2017    History of CVA (cerebrovascular accident) 12/30/2016    Dyslipidemia 06/08/2016    Vertebral artery occlusion 05/13/2016    Impaired glucose tolerance 11/05/2014    Neck pain, chronic 06/25/2014    Vertigo 06/18/2014    Hypertension 04/11/2012    Low back pain 04/11/2012    Allergic rhinitis 04/11/2012    Mixed anxiety depressive disorder 01/01/1960       Current Outpatient Medications   Medication Sig Dispense  Refill    nicotine (NICODERM) 7 MG/24HR PATCH 24 HR Place 1 Patch on the skin every 24 hours. 30 Patch 5    amLODIPine (NORVASC) 5 MG Tab Take 1 Tablet by mouth every day. 100 Tablet 3    albuterol 108 (90 Base) MCG/ACT Aero Soln inhalation aerosol Inhale 2 Puffs every four hours as needed for Shortness of Breath. 1 Each 0    cetirizine (ZYRTEC) 10 MG Tab Take 1 Tablet by mouth every day. 100 Tablet 3    Ascorbic Acid (VITAMIN C) 1000 MG Tab Take  by mouth.      allopurinol (ZYLOPRIM) 100 MG Tab Take 1 Tablet by mouth every day. Indications: Gout 100 Tablet 3    carvedilol (COREG) 6.25 MG Tab Take 1 Tablet by mouth 2 times a day with meals. 200 Tablet 3    Multiple Vitamins-Minerals (CENTRUM WOMEN PO) Take 1 Tablet by mouth every day.      Cholecalciferol (VITAMIN D3 PO) Take 1 Tablet by mouth every day.      acetaminophen (TYLENOL) 325 MG Tab Take 650 mg by mouth every 6 hours as needed for Fever or Mild Pain. Indications: Pain      aspirin EC (ECOTRIN) 81 MG Tablet Delayed Response Take 81 mg by mouth every day. Indications: Stroke Due To Limited Blood Flow       No current facility-administered medications for this visit.          Current supplements as per medication list.     Allergies: Penicillins, Risperdal [benzoic acid-risperidone], Lima beans, Atenolol, Chlorpheniramine, and Peach [prunus persica]    Current social contact/activities: she is planning a trip to go visit some family members in [place] in a few weeks.      She  reports that she has been smoking cigarettes. She has never used smokeless tobacco. She reports that she does not drink alcohol and does not use drugs.  Ready to quit: Not Answered  Counseling given: Not Answered      ROS:    Gait: Uses a walker  Ostomy: No  Other tubes: No  Amputations: No  Chronic oxygen use: No  Last eye exam: 2024  Wears hearing aids: No   : Denies any urinary leakage during the last 6 months    Screening:    Depression Screening  Little interest or pleasure in  doing things?  0 - not at all  Feeling down, depressed , or hopeless? 0 - not at all  Patient Health Questionnaire Score: 0     If depressive symptoms identified deferred to follow up visit unless specifically addressed in assessment and plan.    Interpretation of PHQ-9 Total Score   Score Severity   1-4 No Depression   5-9 Mild Depression   10-14 Moderate Depression   15-19 Moderately Severe Depression   20-27 Severe Depression    Screening for Cognitive Impairment  Do you or any of your friends or family members have any concern about your memory? No  Three Minute Recall (Leader, Season, Table) 2/3    Freeman clock face with all 12 numbers and set the hands to show 10 minutes after 11.  Yes    Cognitive concerns identified deferred for follow up unless specifically addressed in assessment and plan.    Fall Risk Assessment  Has the patient had two or more falls in the last year or any fall with injury in the last year?  No    Safety Assessment  Do you always wear your seatbelt?  Yes  Any changes to home needed to function safely? Yes  Difficulty hearing.  No  Patient counseled about all safety risks that were identified.    Functional Assessment ADLs  Are there any barriers preventing you from cooking for yourself or meeting nutritional needs?  No.    Are there any barriers preventing you from driving safely or obtaining transportation?  No.    Are there any barriers preventing you from using a telephone or calling for help?  No    Are there any barriers preventing you from shopping?  No.    Are there any barriers preventing you from taking care of your own finances?  No    Are there any barriers preventing you from managing your medications?  No    Are there any barriers preventing you from showering, bathing or dressing yourself? Yes    Are there any barriers preventing you from doing housework or laundry? Yes  Are there any barriers preventing you from using the toilet?Yes  Are you currently engaging in any  exercise or physical activity?  No.      Self-Assessment of Health  What is your perception of your health? Fair  Do you sleep more than six hours a night? Yes  In the past 7 days, how much did pain keep you from doing your normal work? Some  Do you spend quality time with family or friends (virtually or in person)? Yes  Do you usually eat a heart healthy diet that constists of a variety of fruits, vegetables, whole grains and fiber? No  Do you eat foods high in fat and/or Fast Food more than three times per week? No    Advance Care Planning  Do you have an Advance Directive, Living Will, Durable Power of , or POLST? No                 Health Maintenance Summary            Overdue - Hepatitis A Vaccine (Hep A) (1 of 2 - Risk 2-dose series) Never done      No completion history exists for this topic.              Bone Density Scan (Every 5 Years) Tentatively due on 10/31/2024      10/31/2019  DS-BONE DENSITY STUDY (DEXA)              Annual Wellness Visit (Yearly) Next due on 5/15/2025      05/15/2024  Visit Dx: Encounter for Medicare annual wellness exam    05/15/2024  Level of Service: ANNUAL WELLNESS VISIT-INCLUDES PPPS SUBSEQUE*    02/23/2024  Level of Service: ANNUAL WELLNESS VISIT-INCLUDES PPPS SUBSEQUE*    02/22/2024  Outside Procedure: [place] ANNUAL WELLNESS VISIT-INCLUDES PPPS SUBSEQUE*    07/01/2022  Level of Service: [place] ANNUAL WELLNESS VISIT-INCLUDES PPPS SUBSEQUE*    Only the first 5 history entries have been loaded, but more history exists.              IMM DTaP/Tdap/Td Vaccine (2 - Td or Tdap) Next due on 10/16/2029      10/16/2019  Imm Admin: Tdap Vaccine              Pneumococcal Vaccine: 65+ Years (Series Information) Completed      10/17/2019  Imm Admin: Pneumococcal Conjugate Vaccine (Prevnar/PCV-13)    10/23/2014  Imm Admin: Pneumococcal polysaccharide vaccine (PPSV-23)              Zoster (Shingles) Vaccines (Series Information) Completed      11/02/2020  Imm Admin: Zoster Vaccine  Recombinant (RZV) (SHINGRIX)    09/03/2020  Imm Admin: Zoster Vaccine Recombinant (RZV) (SHINGRIX)              Hepatitis C Screening  Completed      08/05/2022  Hepatitis C Antibody component of HEP C VIRUS ANTIBODY    08/05/2022  Done    08/04/2022  Outside Procedure: HEP C VIRUS ANTIBODY              Influenza Vaccine (Series Information) Completed      10/16/2023  Imm Admin: Influenza Vaccine Adult HD    09/14/2022  Imm Admin: Influenza Vaccine Adult HD    09/14/2022  Imm Admin: Influenza Seasonal Injectable - Historical Data    09/08/2021  Imm Admin: Influenza Vaccine Adult HD    09/03/2020  Imm Admin: Influenza (IM) Preservative Free - HISTORICAL DATA    Only the first 5 history entries have been loaded, but more history exists.              COVID-19 Vaccine (Series Information) Completed      10/16/2023  Imm Admin: Covid-19 Mrna (Spikevax) Moderna 12+ Years    03/20/2021  Imm Admin: MODERNA SARS-COV-2 VACCINE (12+)    02/20/2021  Imm Admin: MODERNA SARS-COV-2 VACCINE (12+)              Hepatitis B Vaccine (Hep B) (Series Information) Aged Out      No completion history exists for this topic.              HPV Vaccines (Series Information) Aged Out      No completion history exists for this topic.              Polio Vaccine (Inactivated Polio) (Series Information) Aged Out      No completion history exists for this topic.              Meningococcal Immunization (Series Information) Aged Out      No completion history exists for this topic.                    Patient Care Team:  ROCIO Delacruz as PCP - General (Nurse Practitioner Family)  Valentina Neri, PT, DPT as Transitional Care Navigator  [facility] as Home Health Provider  Nancy Ratliff R.N. as         Social History     Tobacco Use    Smoking status: Some Days     Types: Cigarettes    Smokeless tobacco: Never   Vaping Use    Vaping status: Never Used   Substance Use Topics    Alcohol use: No    Drug use: No  "    Family History   Problem Relation Age of Onset    Stroke Father     Alcohol abuse Father     Cancer Sister     Cancer Brother      She  has a past medical history of Abnormal Pap smear, Abnormal Pap smear (04/11/2012), Abnormality of hormone (08/14/2020), Allergic rhinitis, BMI 40.0-44.9, adult (HCC) (07/01/2022), Congestion of nasal sinus (1/7/2020), Encounter for screening for malignant neoplasm of breast (04/11/2012), Eye tearing (10/11/2018), Finger deformity (12/11/2017), Hemorrhoids, HEMORRHOIDS (04/11/2012), Hypertension, LBP (low back pain), Muscle cramps (4/17/2017), Night sweats (7/14/2020), Obesity (11/05/2014), Other specified disorders of bone density and structure, multiple sites (10/16/2019), Sequelae, 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   Past Surgical History:   Procedure Laterality Date    TUBAL COAGULATION LAPAROSCOPIC BILATERAL         Exam:   /84   Pulse 71   Temp 36.6 °C (97.9 °F) (Temporal)   Ht 1.651 m (5' 5\")   Wt 87.7 kg (193 lb 6.4 oz)   SpO2 99%  Body mass index is 32.18 kg/m².    Hearing good.    Dentition fair  Alert, oriented in no acute distress.  Eye contact is good, speech goal directed, affect calm    Assessment and Plan. The following treatment and monitoring plan is recommended:      1. Encounter for Medicare annual wellness exam  Stable    2. Primary hypertension  Chronic, stable  Blood pressure well controlled.   - continue amlodipine 5 mg daily  - continue carvedilol 6.25 mg twice daily    3. CKD stage 3b, GFR 30-44 ml/min  Chronic, stable  Due for labs. Renal dose medication  - TSH WITH REFLEX TO FT4; Future  - Comp Metabolic Panel; Future  - CBC WITHOUT DIFFERENTIAL; Future    4. Dyslipidemia  Chronic, stable  Continue to work on diet and exercise to help with cholesterol management.  - Lipid Profile; Future    5. MDD (recurrent major " depressive disorder) in remission (HCC)  Chronic, stable  Symptoms well-controlled at this time.  No need for medication.  Continue to monitor for signs of worsening depression.    6. Degenerative disease of nervous system, unspecified (HCC)  Chronic, stable  Patient does suffer from mild memory loss.  Continue to monitor for worsening memory impairment.    7. Atherosclerosis of aorta (HCC)  Chronic, stable  Continue to work on diet and exercise to help with cholesterol management.  - Lipid Profile; Future    8. Peripheral vascular disease, unspecified (HCC)  Chronic, stable  No swelling noted today.  Continue to elevate feet as needed for symptom management.    9. Tobacco dependence  Chronic, stable  Nicotine cessation discussed with patient.  Patient wishing to start nicotine patches.  - nicotine (NICODERM) 7 MG/24HR PATCH 24 HR; Place 1 Patch on the skin every 24 hours.  Dispense: 30 Patch; Refill: 5      Services suggested: No services needed at this time  Health Care Screening: Age-appropriate preventive services recommended by USPTF and ACIP covered by Medicare were discussed today. Services ordered if indicated and agreed upon by the patient.  Referrals offered: Community-based lifestyle interventions to reduce health risks and promote self-management and wellness, fall prevention, nutrition, physical activity, tobacco-use cessation, weight loss, and mental health services as per orders if indicated.    Discussion today about general wellness and lifestyle habits:    Prevent falls and reduce trip hazards; Cautioned about securing or removing rugs.  Have a working fire alarm and carbon monoxide detector;   Engage in regular physical activity and social activities     Follow-up: Return in about 3 months (around 8/15/2024).

## 2024-05-21 DIAGNOSIS — M10.9 GOUT, UNSPECIFIED CAUSE, UNSPECIFIED CHRONICITY, UNSPECIFIED SITE: ICD-10-CM

## 2024-05-21 DIAGNOSIS — I10 PRIMARY HYPERTENSION: ICD-10-CM

## 2024-05-21 DIAGNOSIS — F17.200 TOBACCO DEPENDENCE: ICD-10-CM

## 2024-05-21 RX ORDER — AMLODIPINE BESYLATE 5 MG/1
5 TABLET ORAL DAILY
Qty: 100 TABLET | Refills: 3 | Status: SHIPPED | OUTPATIENT
Start: 2024-05-21

## 2024-05-21 NOTE — TELEPHONE ENCOUNTER
Received request via: Patient    Was the patient seen in the last year in this department? Yes    Does the patient have an active prescription (recently filled or refills available) for medication(s) requested? No    Pharmacy Name: [facility]   \    Does the patient have nursing home Plus and need 100 day supply (blood pressure, diabetes and cholesterol meds only)? Yes, quantity updated to 100 days and Medication is not for cholesterol, blood pressure or diabetes

## 2024-05-21 NOTE — TELEPHONE ENCOUNTER
Pt called and LVM requesting a refill for her Amlodipine (3 months supply) and tobacco patches. Pt stated she never got the tobacco patches and she going on vacation and need 3 months supply for her BP meds.